# Patient Record
Sex: MALE | Race: WHITE
[De-identification: names, ages, dates, MRNs, and addresses within clinical notes are randomized per-mention and may not be internally consistent; named-entity substitution may affect disease eponyms.]

---

## 2020-08-02 ENCOUNTER — HOSPITAL ENCOUNTER (INPATIENT)
Dept: HOSPITAL 74 - JER | Age: 80
LOS: 4 days | Discharge: NURSING, REHAB OR LONG-TERM CARE | DRG: 291 | End: 2020-08-06
Attending: INTERNAL MEDICINE | Admitting: INTERNAL MEDICINE
Payer: COMMERCIAL

## 2020-08-02 VITALS — BODY MASS INDEX: 19.9 KG/M2

## 2020-08-02 DIAGNOSIS — I25.10: ICD-10-CM

## 2020-08-02 DIAGNOSIS — D50.9: ICD-10-CM

## 2020-08-02 DIAGNOSIS — Z95.2: ICD-10-CM

## 2020-08-02 DIAGNOSIS — Z86.73: ICD-10-CM

## 2020-08-02 DIAGNOSIS — I44.0: ICD-10-CM

## 2020-08-02 DIAGNOSIS — R33.9: ICD-10-CM

## 2020-08-02 DIAGNOSIS — L89.151: ICD-10-CM

## 2020-08-02 DIAGNOSIS — E78.5: ICD-10-CM

## 2020-08-02 DIAGNOSIS — D50.1: ICD-10-CM

## 2020-08-02 DIAGNOSIS — I11.0: Primary | ICD-10-CM

## 2020-08-02 DIAGNOSIS — I35.0: ICD-10-CM

## 2020-08-02 DIAGNOSIS — R42: ICD-10-CM

## 2020-08-02 DIAGNOSIS — Z95.5: ICD-10-CM

## 2020-08-02 DIAGNOSIS — E46: ICD-10-CM

## 2020-08-02 DIAGNOSIS — T83.518A: ICD-10-CM

## 2020-08-02 DIAGNOSIS — E03.9: ICD-10-CM

## 2020-08-02 DIAGNOSIS — I45.10: ICD-10-CM

## 2020-08-02 DIAGNOSIS — K21.9: ICD-10-CM

## 2020-08-02 DIAGNOSIS — N39.0: ICD-10-CM

## 2020-08-02 DIAGNOSIS — B96.5: ICD-10-CM

## 2020-08-02 DIAGNOSIS — I50.23: ICD-10-CM

## 2020-08-02 DIAGNOSIS — N40.0: ICD-10-CM

## 2020-08-02 DIAGNOSIS — E43: ICD-10-CM

## 2020-08-02 LAB
ALBUMIN SERPL-MCNC: 2.8 G/DL (ref 3.4–5)
ALP SERPL-CCNC: 101 U/L (ref 45–117)
ALT SERPL-CCNC: 12 U/L (ref 13–61)
ANION GAP SERPL CALC-SCNC: 4 MMOL/L (ref 8–16)
APPEARANCE UR: (no result)
APTT BLD: 34.9 SECONDS (ref 25.2–36.5)
AST SERPL-CCNC: 32 U/L (ref 15–37)
BACTERIA # UR AUTO: 5261 /UL (ref 0–1359)
BASOPHILS # BLD: 0.7 % (ref 0–2)
BILIRUB SERPL-MCNC: 0.4 MG/DL (ref 0.2–1)
BILIRUB UR STRIP.AUTO-MCNC: NEGATIVE MG/DL
BNP SERPL-MCNC: 6407.7 PG/ML (ref 5–450)
BUN SERPL-MCNC: 26.3 MG/DL (ref 7–18)
CALCIUM SERPL-MCNC: 8.6 MG/DL (ref 8.5–10.1)
CASTS URNS QL MICRO: 5 /UL (ref 0–3.1)
CHLORIDE SERPL-SCNC: 107 MMOL/L (ref 98–107)
CO2 SERPL-SCNC: 25 MMOL/L (ref 21–32)
COLOR UR: YELLOW
CREAT SERPL-MCNC: 1.3 MG/DL (ref 0.55–1.3)
DEPRECATED RDW RBC AUTO: 17.5 % (ref 11.9–15.9)
EOSINOPHIL # BLD: 3.1 % (ref 0–4.5)
EPITH CASTS URNS QL MICRO: 20 /UL (ref 0–25.1)
GLUCOSE SERPL-MCNC: 78 MG/DL (ref 74–106)
HCT VFR BLD CALC: 34.9 % (ref 35.4–49)
HGB BLD-MCNC: 11.3 GM/DL (ref 11.7–16.9)
INR BLD: 1.18 (ref 0.83–1.09)
KETONES UR QL STRIP: NEGATIVE
LEUKOCYTE ESTERASE UR QL STRIP.AUTO: (no result)
LYMPHOCYTES # BLD: 11.3 % (ref 8–40)
MAGNESIUM SERPL-MCNC: 2.2 MG/DL (ref 1.8–2.4)
MCH RBC QN AUTO: 26.2 PG (ref 25.7–33.7)
MCHC RBC AUTO-ENTMCNC: 32.3 G/DL (ref 32–35.9)
MCV RBC: 81.2 FL (ref 80–96)
MONOCYTES # BLD AUTO: 9.8 % (ref 3.8–10.2)
NEUTROPHILS # BLD: 75.1 % (ref 42.8–82.8)
NITRITE UR QL STRIP: NEGATIVE
PH UR: 6 [PH] (ref 5–8)
PLATELET # BLD AUTO: 271 K/MM3 (ref 134–434)
PMV BLD: 8.5 FL (ref 7.5–11.1)
POTASSIUM SERPLBLD-SCNC: 4.8 MMOL/L (ref 3.5–5.1)
PROT SERPL-MCNC: 6.9 G/DL (ref 6.4–8.2)
PROT UR QL STRIP: (no result)
PROT UR QL STRIP: NEGATIVE
PT PNL PPP: 13.9 SEC (ref 9.7–13)
RBC # BLD AUTO: 132 /UL (ref 0–23.9)
RBC # BLD AUTO: 4.29 M/MM3 (ref 4–5.6)
SODIUM SERPL-SCNC: 137 MMOL/L (ref 136–145)
SP GR UR: 1.01 (ref 1.01–1.03)
UROBILINOGEN UR STRIP-MCNC: 0.2 MG/DL (ref 0.2–1)
WBC # BLD AUTO: 8 K/MM3 (ref 4–10)
WBC # UR AUTO: 1575 /UL (ref 0–25.8)

## 2020-08-02 PROCEDURE — U0003 INFECTIOUS AGENT DETECTION BY NUCLEIC ACID (DNA OR RNA); SEVERE ACUTE RESPIRATORY SYNDROME CORONAVIRUS 2 (SARS-COV-2) (CORONAVIRUS DISEASE [COVID-19]), AMPLIFIED PROBE TECHNIQUE, MAKING USE OF HIGH THROUGHPUT TECHNOLOGIES AS DESCRIBED BY CMS-2020-01-R: HCPCS

## 2020-08-02 PROCEDURE — E0186 AIR PRESSURE MATTRESS: HCPCS

## 2020-08-02 PROCEDURE — G0378 HOSPITAL OBSERVATION PER HR: HCPCS

## 2020-08-02 RX ADMIN — FUROSEMIDE SCH: 10 INJECTION, SOLUTION INTRAVENOUS at 22:29

## 2020-08-02 RX ADMIN — DOCUSATE SODIUM SCH: 100 CAPSULE, LIQUID FILLED ORAL at 22:29

## 2020-08-02 RX ADMIN — ATORVASTATIN CALCIUM SCH: 20 TABLET, FILM COATED ORAL at 22:29

## 2020-08-02 RX ADMIN — FERROUS SULFATE TAB EC 324 MG (65 MG FE EQUIVALENT) SCH: 324 (65 FE) TABLET DELAYED RESPONSE at 22:29

## 2020-08-02 NOTE — PDOC
Rapid Medical Evaluation


Time Seen by Provider: 08/02/20 15:33


Medical Evaluation: 





08/02/20 15:38


I have performed a brief in-person evaluation of this patient.





CC: left sided chest pain staring this morning at rest. D/C'd from [facility] 

with dx- ileus, JO ANN, hypoNa+, urinary retention





PE: Lungs CTAB. RRR. No m/r/g. 2+ pedal edema. 





Orders: cardiac w/u





Patient will proceed to ED for further evaluation.





**Discharge Disposition





- Diagnosis


 Chest pain








- Referrals





- Patient Instructions





- Post Discharge Activity

## 2020-08-02 NOTE — HP
CHIEF COMPLAINT: chest pain





PCP: none, recently moved to area





HISTORY OF PRESENT ILLNESS:


Patient is an 79 year old man with a PMH of HTN, CHF, Aortic Stenosis (with 

valve replacement), coronary stent placement, TIA, GERD, BPH,  Urinary retention

(with indwelling swenson catheter), and Hypothyroidism with 2 day history of chest

pain. Pt reports 2 episodes (once yesterday evening and once today afternoon) of

5/10 pressure-like chest pain that radiates to his L shoulder and resolve 

spontaneously. The first episode lasted "1-2 minutes" and the second episode 

today lasted longer at "about 5-10 minutes." Reports taking aspiring 325 mg 

after his second episode. Pt was sitting during both episodes with no exertion 

beforehand. Pt reports associated dizziness, orthopnea, leg swelling (over the 

last 2-3 weeks), and dyspnea on exertion (over the last 6 months; not acutely 

worsened from baseline). Denies headache, PND, palpitations, fevers, chills, 

nausea, vomiting, diarrhea, abdominal pain, and constipation. Pt reports not 

taking his prescribed lasix along with his other home medications because he 

doesn't want to be taking so many medications. A recent change in the pt's life 

has been that his wife of nearly 60 years  about 3 weeks ago and the pt 

reports feelings of depression and hopelessness; without active suicidal or 

homicidal ideation. Pt also with 1 episode of self-resolving hematuria.








ER course was notable for:


(1) Trops neg x 2; BNP > 6000 


(2) No ST elevations or T wave changes on EKG


(3) CXR to be done





Recent Travel: none; Sick Contacts:  none





PAST MEDICAL HISTORY:


As per HPI.


Per pt's daughter, pt seen at [facility] on ; indwelling 

swenson placed then. 


Most of pt's doctor were at [facility]





PAST SURGICAL HISTORY:


Stent Placement x2 (appox 10 years ago and 20 years ago)


Aortic Valve Replacement ("at least 10 years ago"





Social History:


Pt used to live in [place] with his wife before his wife passed. He has now

moved to [place] to be closer to his daughter and lives on his own; within 5 

minutes of his daughter. Daughter checks in on pt 3-5 days/week; expresses 

desire to get the pt a home health aide. Pt ambulates on own with walker/cane 

and can handle most of his ADLs. 





Smoking: quit 40 years ago; 10 year hx of 1.5 ppd before then


Alcohol: denies


Drugs: denies





Family Hx - denies family hx 





Allergies - no known allergies





No Known Allergies Allergy (Verified 20 15:43)


   








HOME MEDICATIONS:


                                Home Medications











 Medication  Instructions  Recorded


 


Amlodipine Besylate 5 mg PO DAILY 20


 


Atorvastatin Ca [Lipitor] 20 mg PO HS 20


 


Carvedilol [Coreg -] 3.125 mg PO BID 20


 


Docusate Sodium [Colace -] 100 mg PO BID 20


 


Ergocalciferol (Vitamin D2) 50,000 unit PO WEEKLY 20





[Vitamin D2]  


 


Ferrous Sulfate 325 mg PO BID 20


 


Levothyroxine [Synthroid -] 100 mcg PO DAILY 20


 


Metoprolol Succinate [Toprol Xl] 25 mg PO DAILY 20


 


Tamsulosin HCl 0.4 mg PO DAILY 20





Patient reports he has not been taking his home meds.





REVIEW OF SYSTEMS


as per HPI








PHYSICAL EXAMINATION


                               Vital Signs - 24 hr











  20





  15:38 16:00 16:54


 


Temperature 98 F  


 


Pulse Rate 61 61 


 


Respiratory 18  18





Rate   


 


Blood Pressure 116/70  


 


O2 Sat by Pulse 99 100 100





Oximetry (%)   











GENERAL: Awake, alert, and fully oriented, in no acute distress. 


HEAD: NCAT


EYES: PERRLA, EOMI, sclera white, conjunctiva clear.


EARS, NOSE, THROAT: oropharynx clear without exudates. Moist mucous membranes.


NECK:  supple without lymphadenopathy, trachea midline


LUNGS: Bilateral crackles at the bases. Breath sounds equal. No wheezes. No 

accessory muscle use.


HEART: Regular rate and rhythm, normal S1 and S2. Systolic mumur over aortic 

area.


ABDOMEN:  Soft, nontender, not distended, normoactive bowel sounds. No 

suprapubic tenderness


MUSCULOSKELETAL: Moving all extremities equally and spontaneously.


UPPER EXTREMITIES: 2+ pulses, warm, well-perfused. No edema.


LOWER EXTREMITIES: 2+ pulses, warm, well-perfused. No calf tenderness. 2+ 

pitting edema


NEUROLOGICAL: Sensation to light touch and strength grossly intact. Normal 

speech. Normal gait.


PSYCHIATRIC: Cooperative. Good eye contact. Depressed mood and affect.


SKIN: lower extremity skin excoriations bilaterally; hyperkeratosis around both 

ankles.





                         Laboratory Results - last 24 hr











  20





  16:15 16:15 16:15


 


WBC   8.0 


 


RBC   4.29 


 


Hgb   11.3 L 


 


Hct   34.9 L 


 


MCV   81.2 


 


MCH   26.2 


 


MCHC   32.3 


 


RDW   17.5 H 


 


Plt Count   271 


 


MPV   8.5 


 


Absolute Neuts (auto)   6.0 


 


Neutrophils %   75.1 


 


Lymphocytes %   11.3 


 


Monocytes %   9.8 


 


Eosinophils %   3.1 


 


Basophils %   0.7 


 


Nucleated RBC %   0 


 


PT with INR  13.90 H  


 


INR  1.18 H  


 


PTT (Actin FS)  34.9  


 


Sodium    137


 


Potassium    4.8


 


Chloride    107


 


Carbon Dioxide    25


 


Anion Gap    4 L


 


BUN    26.3 H


 


Creatinine    1.3


 


Est GFR (CKD-EPI)AfAm    59.73


 


Est GFR (CKD-EPI)NonAf    51.53


 


Random Glucose    78


 


Calcium    8.6


 


Magnesium    2.2


 


Total Bilirubin    0.4


 


AST    32


 


ALT    12 L


 


Alkaline Phosphatase    101


 


Creatine Kinase    35


 


Troponin I    0.02


 


B-Natriuretic Peptide    6407.7 H


 


Total Protein    6.9


 


Albumin    2.8 L














  20





  19:35


 


WBC 


 


RBC 


 


Hgb 


 


Hct 


 


MCV 


 


MCH 


 


MCHC 


 


RDW 


 


Plt Count 


 


MPV 


 


Absolute Neuts (auto) 


 


Neutrophils % 


 


Lymphocytes % 


 


Monocytes % 


 


Eosinophils % 


 


Basophils % 


 


Nucleated RBC % 


 


PT with INR 


 


INR 


 


PTT (Actin FS) 


 


Sodium 


 


Potassium 


 


Chloride 


 


Carbon Dioxide 


 


Anion Gap 


 


BUN 


 


Creatinine 


 


Est GFR (CKD-EPI)AfAm 


 


Est GFR (CKD-EPI)NonAf 


 


Random Glucose 


 


Calcium 


 


Magnesium 


 


Total Bilirubin 


 


AST 


 


ALT 


 


Alkaline Phosphatase 


 


Creatine Kinase 


 


Troponin I  0.02


 


B-Natriuretic Peptide 


 


Total Protein 


 


Albumin 











ASSESSMENT/PLAN:


Patient is an 79 year old man with a PMH of HTN, CHF, Aortic Stenosis (with 

valve replacement), coronary stent placement, TIA, GERD, BPH,  Urinary retention

(with indwelling swenson catheter), and Hypothyroidism being admitted to telemetry

for CHF exacerbation.





#Chest Pain/Acute on Chronic CHF exacerbation (likely 2/2 to medication non-

adherence)


Troponin neg (x2)


Pt recently moved from [place]; no ECHO on file; BNP 6407.7 (baseline unkn

own)


- continue to trend troponin


- IV lasix 40 daily; escalate according to pt response


- f/u CXR


- ECHO


- Strict monitoring of I/O; daily weights


- Repeat EKG as pt has no previous EKG in system for comparison


- check TSH, fasting lipids, and UA


- monitor electrolytes and renal function





#Anemia (unclear etiology, Hb 11.3)


self-resolving hematuria


- check iron studies, reticulocyte count, and serial stool guiacs


- check UA





#Suspected Acute Complicated UTI


Likely 2/2 to chronic indwelling swenson cather (played on )


- UA with 3+ leuk esterase, wbc


- check urine cx 


- rocephin 1g daily 





#Hypoalbuminemia


2/2 to malnutrition vs inflammation from comorbid conditions


- consult dietician


- check UA





#HTN


- hold home antihypertensive: metoprolol 25 daily; amlodipine 5 daily; coreg 

3.125 bid





#BPH


- continue home flomax 0.4 mg





#HLD


- continue lipitor 20 mg 





#Hypothyroid


- Synthroid 100 mcg PO





#DVT PPx


- Lovenox 40 mg SQ daily





#FEN


-F - PO


-E - monitor lytes; replete PRN


-N - low Na diet





Dispo: Admit to telemetry








Visit type





- Medication Review


Med list reviewed for High Risk Meds patients 65 and older: Yes





- Emergency Visit


Emergency Visit: Yes


ED Registration Date: 20


Care time: The patient presented to the Emergency Department on the above date 

and was hospitalized for further evaluation of their emergent condition.





- New Patient


This patient is new to me today: Yes


Date on this admission: 20





- Critical Care


Critical Care patient: No





ATTENDING PHYSICIAN STATEMENT





I saw and evaluated the patient.


I reviewed the resident's note and discussed the case with the resident.


I agree with the resident's findings and plan as documented.








SUBJECTIVE:








OBJECTIVE:








ASSESSMENT AND PLAN:

## 2020-08-02 NOTE — PDOC
History of Present Illness





- General


Chief Complaint: Chest Pain


Stated Complaint: CHEST PAIN


Time Seen by Provider: 08/02/20 15:33





- History of Present Illness


Initial Comments: 


78 yo male with PMH of htn, hld, chf, TIA, GERD, BPH w/ indwelling catheter, 

aortic stenosis w/ valve replacement, and coronary stents presenting with 2 day 

hx of chest pressure. Chest pressure started at noon, lasted 15 minutes, and 

radiated to the left arm. He endorses SOB that is consistent with his baseline 

CHF. He had 1 episode of hematuria that resolved spontaneously. He denies ha, 

n/v/d, abd pain, bloody stool. 


08/02/20 18:24








Past History





- Medical History


Allergies/Adverse Reactions: 


                                    Allergies











Allergy/AdvReac Type Severity Reaction Status Date / Time


 


No Known Allergies Allergy   Verified 08/02/20 15:43











Home Medications: 


Ambulatory Orders





Amlodipine Besylate 5 mg PO DAILY 08/02/20 


Atorvastatin Ca [Lipitor] 20 mg PO HS 08/02/20 


Carvedilol [Coreg -] 3.125 mg PO BID 08/02/20 


Docusate Sodium [Colace -] 100 mg PO BID 08/02/20 


Ergocalciferol (Vitamin D2) [Vitamin D2] 50,000 unit PO WEEKLY 08/02/20 


Ferrous Sulfate 325 mg PO BID 08/02/20 


Levothyroxine [Synthroid -] 100 mcg PO DAILY 08/02/20 


Metoprolol Succinate [Toprol Xl] 25 mg PO DAILY 08/02/20 


Tamsulosin HCl 0.4 mg PO DAILY 08/02/20 








Cardiac Disorders: Yes


COPD: No


CHF: No


 Disorders: Yes (urinary retention swenson in place)


HTN: Yes





- Surgical History


Cardiac Surgery: Yes (x 2)


GI Surgery: Yes





- Psycho-Social/Smoking History


Smoking History: Never smoked





- Substance Abuse Hx (Audit-C & DAST Scrn)


How often the patient has a drink containing alcohol: Never


Score: In Men: 4 or > Positive; In Women: 3 or > Positive: 0


Screen Result (Pos requires Nsg. Audit-10AR): Negative





**Review of Systems





- Review of Systems


Constitutional: No: Chills, Fever


HEENTM: No: Recent change in vision, Double Vision


Respiratory: Yes: Shortness of Breath.  No: Cough


Cardiac (ROS): Yes: Chest Pain, Edema, Chest Tightness (radiating to the left). 

No: Palpitations


ABD/GI: No: Blood Streaked Bowels, Diarrhea, Nausea, Vomiting


: Yes: Hematuria.  No: Dysuria


Musculoskeletal: No: Joint Pain, Joint Swelling


Integumentary: No: Dryness, Erythema, Lesions


Neurological: No: Headache


Psychiatric: No: Anxiety, Depression, Mood Swings


Endocrine: No: Intolerance to Cold, Intolerance to Heat





*Physical Exam





- Vital Signs


                                Last Vital Signs











Temp Pulse Resp BP Pulse Ox


 


 98 F   61   18   116/70   99 


 


 08/02/20 15:38  08/02/20 15:38  08/02/20 15:38  08/02/20 15:38  08/02/20 15:38














- Physical Exam


General Appearance: Yes: Appropriately Dressed.  No: Apparent Distress


HEENT: positive: Normal Voice.  negative: EOMI


Neck: negative: Tender, Rigid


Respiratory/Chest: positive: Normal Breath Sounds, Other.  negative: Chest 

Tender, Respiratory Distress


Cardiovascular: positive: Regular Rhythm, Regular Rate, S1, S2, Edema (b/l LE 

edema)


Gastrointestinal/Abdominal: positive: Flat, Soft.  negative: Tender


Musculoskeletal: positive: Normal Inspection.  negative: CVA Tenderness


Extremity: positive: Normal Inspection


Integumentary: positive: Normal Color, Dry, Warm


Neurologic: positive: Fully Oriented, Alert, Normal Mood/Affect





ED Treatment Course





- LABORATORY


CBC & Chemistry Diagram: 


                                 08/02/20 16:15





                                 08/02/20 16:15





Medical Decision Making





- Medical Decision Making


78 yo male with PMH of htn, hld, chf, TIA, GERD, BPH w/ indwelling catheter, 

aortic stenosis w/ valve replacement, and coronary stents presenting with 2 day 

hx of chest pressure. EKG does not show any st elevations. 





BNP is elevated at 6400


1st Troponin is negative


2nd Troponin is pending





Plan to admit for observation/telemetry 








Discharge





- Discharge Information


Problems reviewed: Yes


Clinical Impression/Diagnosis: 


 Chest pain, Bilateral lower extremity edema








- Admission


Yes





- Follow up/Referral





- Patient Discharge Instructions





- Post Discharge Activity

## 2020-08-02 NOTE — PN
Teaching Attending Note


Name of Resident: Bandar Shaikh





ATTENDING PHYSICIAN STATEMENT





I saw and evaluated the patient.


I reviewed the resident's note and discussed the case with the resident.


I agree with the resident's findings and plan as documented.








SUBJECTIVE:


Patient is an 79 year old man with a PMH of HTN, CHF, TIA, GERD, BPH, Hypothy

roidism, Urinary retention (with indwelling swensno catheter), Aortic stenosis 

(with valve replacement) and Coronary stents presenting with 2 day history of 

chest pressure. Chest pressure started at noon, lasted 15 minutes, and radiated 

to the left arm. He reports SOB that is consistent with his baseline CHF, but he

has had worsening SOB over the past 6 months. has also had orthopnea and 

increasing leg edema. He has not been taking prescribed Lasix as well as most 

his other drugs because he feels they are "too many". He had 1 episode of 

hematuria that resolved spontaneously. Patient denies abdominal pain, headache, 

palpitations, dizziness, fever, chills, nausea, vomiting, diarrhea, 

constipation, dysuria, frequency, urgency, melena or hematochezia. Patient's 

wife  about twenty days ago and he then moved to [place] because his 

daughter lives here. All his physicians were in [place], [place]. Uses a walker/cane.

Denies alcohol, tobacco or illicit drug use. No sick contacts or recent travels.

Family history is unremarkable.  





OBJECTIVE:


Alert


                                   Vital Signs











 Period  Temp  Pulse  Resp  BP Sys/Villa  Pulse Ox


 


 Last 24 Hr  98 F  61-61  18-18  116/70  








HEENT: No Jaundice, eye redness or discharge, PERRLA, EOMI. Normocephalic, 

atraumatic. External ears are normal and hearing is grossly intact. No nasal 

discharge.


Neck: Supple, nontender. No palpable adenopathy or thyromegaly. No JVD


Chest: Good effort. Bibasilar crackles. Clear to percussion.


Heart: Regular. No S3 or rub; 2/6 VERONA.


Abdomen: Not distended, soft, nontender and no HSM. No rebound or guarding. 

Normal bowel sounds.


Ext: Peripheral pulses intact. Leg edema. Chronic stasis dermatitis changes.


Skin: Warm and dry. No petechiae, rash or ecchymosis.


Neuro: Alert. Oriented x3. CN 2-12 grossly intact. Sensation grossly intact in 

all four extremities and DTR are symmetric.


Psych: Appropriate mood and affect. Good insight.


                                Home Medications











 Medication  Instructions  Recorded


 


Amlodipine Besylate 5 mg PO DAILY 20


 


Aspirin [ASA -] 325 mg PO DAILY 20


 


Atorvastatin Ca [Lipitor] 20 mg PO HS 20


 


Carvedilol [Coreg -] 3.125 mg PO BID 20


 


Docusate Sodium [Colace -] 100 mg PO BID 20


 


Ergocalciferol (Vitamin D2) 50,000 unit PO WEEKLY 20





[Vitamin D2]  


 


Ferrous Sulfate 325 mg PO BID 20


 


Levothyroxine [Synthroid -] 100 mcg PO DAILY 20


 


Metoprolol Succinate [Toprol Xl] 25 mg PO DAILY 20


 


Sodium Bicarbonate - 1,950 mg PO TID 20


 


Tamsulosin HCl 0.4 mg PO DAILY 20








                              Abnormal Lab Results











  20





  16:15 16:15 16:15


 


Hgb   11.3 L 


 


Hct   34.9 L 


 


RDW   17.5 H 


 


PT with INR  13.90 H  


 


INR  1.18 H  


 


Anion Gap    4 L


 


BUN    26.3 H


 


ALT    12 L


 


B-Natriuretic Peptide    6407.7 H


 


Albumin    2.8 L








                               Current Medications











Generic Name Dose Route Start Last Admin





  Trade Name Freq  PRN Reason Stop Dose Admin


 


Atorvastatin Calcium  20 mg  20 22:00 





  Lipitor -  PO  





  HS [facility]  


 


Docusate Sodium  100 mg  20 22:00 





  Colace -  PO  





  BID [facility]  


 


Enoxaparin Sodium  40 mg  20 10:00 





  Lovenox -  SQ  





  DAILY [facility]  


 


Ergocalciferol  50,000 unit  20 22:00 





  Drisdol -  PO  





  Blanc@2200 [facility]  


 


Ferrous Sulfate  325 mg  20 22:00 





  Feosol -  PO  





  BID [facility]  


 


Furosemide  40 mg  20 21:45 





  Lasix Injection -  IVPUSH  





  DAILY [facility]  


 


Levothyroxine Sodium  100 mcg  20 07:00 





  Synthroid -  PO  





  DAILY@0700 [facility]  


 


Tamsulosin HCl  0.4 mg  20 10:00 





  Flomax -  PO  





  DAILY [facility]  











ASSESSMENT AND PLAN:


1. Chest pain/CHF exacerbation - Has risk factors for ACS. CHF exacerbation 

likely precipitated in part by nonadherence with medications. EKG shows NSR at  

 63/minute, 1o AV block, RBBB, LAD, LAE, q wave in II, III, aVF and QTc 503 with

no significant ST-T wave changes. No old EKG available for comparison. Troponin 

is negative x2. Will avoid drugs that may prolong QTc. CXR has been done, but we

are unable to see the film. Reportedly took Aspirin 325 mg. Will admit to 

telemetry, get ECHO, TSH, fasting lipids and urinalysis STAT. 





Will treat with escalating doses of IV Lasix to achieve adequate diuresis, 

restrict dietary salt intake, monitor renal function, monitor and replete 

electrolytes, get daily weight and consult Cardiology. During the day, will work

with his family to get his medical records from [facility]. Will also confirm 

the last time his swenson was changed. Viral testing for COVID-19 ordered and 

patient placed on airborne, droplet and contact isolation. Will continue 

comprehensive care for all of patients comorbid conditions including Flomax for

BPH and Synthroid for hypothroidism.





2. Hypoalbuminemia - Possibly due to combined effects of malnutrition and 

inflammation associated with comorbid conditions. Will ensure adequate dietary 

protein intake and also consult dietician. Urinalysis pending.





3. Anemia - Cause unclear. Will do basic anemia work up including serial stool 

guaiacs, reticulocyte count and iron studies. 





4. Hypertension  Will hold all outpatient antihypertensive drugs for now to 

permit liberal use of IV lasix for diuresis. Subsequently, will revise regimen 

to ensure round-the-clock excellent BP control. Patient counseled on the 

injurious effects of uncontrolled hypertension. Nonpharmacologic measures to 

control hypertension like weight loss, salt restriction and exercise stressed. 

Importance of adherence to treatment regimen and attainment of normotension 

emphasized. 





5. DVT prophylaxis - Lovenox 40 mg SQ q 24 hours.     





6. Advance directives - Full code

## 2020-08-02 NOTE — PDOC
Documentation entered by Lisseth Hess SCRIBE, acting as scribe for Sakina Bergman MD.








Sakina Bergman MD:  This documentation has been prepared by the narinderibeDeshawn Ana, SCRIBE, under my direction and personally reviewed by me in its 

entirety.  I confirm that the documentation accurately reflects all work, 

treatment, procedures, and medical decision making performed by me.  





Attending Attestation





- Resident


Resident Name: Ian Murray





- ED Attending Attestation


I have performed the following: I have examined & evaluated the patient, The 

case was reviewed & discussed with the resident, I agree w/resident's findings &

plan, Exceptions are as noted





- HPI


HPI: 





20 17:03


Patient is an 79 year old male with a significant past medical history of 

hypertension, hld, chf, TIA, GERD, BPH w/ indwelling catheter, aortic stenosis 

w/ valve replacement, and coronary stents, who presents to the ED with chest 

pressure x2 days.





Patient denies:


This patient's  wife just  2 1/2  weeks ago and he then he recently moved to

[place] because his daughter lives here.    All his physicians were in 

[facility] 


Allergies: NKDA











20 18:18





20 18:49








- Physicial Exam


PE: 





20 18:23


wnwd 78 yo male p/w 2 days of shortness of breath and chest pain


head ncat 


neck supple


lungs fine bibasilar rales 


cvs   [id number]


abdomen  nontender


skin warm and dry


extremities +3 pitting edema LE


neuro axox3,ambulatory


psych  sad affect





- Medical Decision Making





20 18:29


troponin first negative


bnp >6000


20 18:38


ekg  NSR with !st degree AV block with , RBBB,  qs  in II,III,aVF   We have no 

prior ekgs





pt now comfortable, he took 325 mg aspirin this morning


plan  Telemetry admission





Discharge





- Discharge Information


Problems reviewed: Yes


Clinical Impression/Diagnosis: 


 Chest pain, Bilateral lower extremity edema








- Follow up/Referral





- Patient Discharge Instructions





- Post Discharge Activity

## 2020-08-03 LAB
ALBUMIN SERPL-MCNC: 2.7 G/DL (ref 3.4–5)
ALP SERPL-CCNC: 89 U/L (ref 45–117)
ALT SERPL-CCNC: 10 U/L (ref 13–61)
ANION GAP SERPL CALC-SCNC: 6 MMOL/L (ref 8–16)
AST SERPL-CCNC: 28 U/L (ref 15–37)
BILIRUB SERPL-MCNC: 0.5 MG/DL (ref 0.2–1)
BUN SERPL-MCNC: 25.8 MG/DL (ref 7–18)
CALCIUM SERPL-MCNC: 8.3 MG/DL (ref 8.5–10.1)
CHLORIDE SERPL-SCNC: 108 MMOL/L (ref 98–107)
CHOLEST SERPL-MCNC: 125 MG/DL (ref 50–200)
CO2 SERPL-SCNC: 25 MMOL/L (ref 21–32)
CREAT SERPL-MCNC: 1.3 MG/DL (ref 0.55–1.3)
DEPRECATED RDW RBC AUTO: 16.9 % (ref 11.9–15.9)
GLUCOSE SERPL-MCNC: 75 MG/DL (ref 74–106)
HCT VFR BLD CALC: 33.8 % (ref 35.4–49)
HDLC SERPL-MCNC: 43 MG/DL (ref 40–60)
HGB BLD-MCNC: 10.6 GM/DL (ref 11.7–16.9)
IRON SERPL-MCNC: 30 UG/DL (ref 50–175)
LDLC SERPL CALC-MCNC: 65 MG/DL (ref 5–100)
MAGNESIUM SERPL-MCNC: 2.1 MG/DL (ref 1.8–2.4)
MCH RBC QN AUTO: 25.5 PG (ref 25.7–33.7)
MCHC RBC AUTO-ENTMCNC: 31.4 G/DL (ref 32–35.9)
MCV RBC: 81.2 FL (ref 80–96)
PHOSPHATE SERPL-MCNC: 3.2 MG/DL (ref 2.5–4.9)
PLATELET # BLD AUTO: 255 K/MM3 (ref 134–434)
PMV BLD: 8.7 FL (ref 7.5–11.1)
POTASSIUM SERPLBLD-SCNC: 4.6 MMOL/L (ref 3.5–5.1)
PROT SERPL-MCNC: 6.2 G/DL (ref 6.4–8.2)
RBC # BLD AUTO: 4.16 M/MM3 (ref 4–5.6)
SODIUM SERPL-SCNC: 139 MMOL/L (ref 136–145)
TIBC SERPL-MCNC: 274 UG/DL (ref 250–450)
TRIGL SERPL-MCNC: 58 MG/DL (ref 0–150)
WBC # BLD AUTO: 7.8 K/MM3 (ref 4–10)
YEAST BUDDING URNS QL: (no result)

## 2020-08-03 RX ADMIN — ATORVASTATIN CALCIUM SCH MG: 20 TABLET, FILM COATED ORAL at 21:04

## 2020-08-03 RX ADMIN — CEFTRIAXONE SCH MLS/HR: 1 INJECTION, POWDER, FOR SOLUTION INTRAMUSCULAR; INTRAVENOUS at 11:13

## 2020-08-03 RX ADMIN — TAMSULOSIN HYDROCHLORIDE SCH MG: 0.4 CAPSULE ORAL at 09:43

## 2020-08-03 RX ADMIN — CARVEDILOL SCH MG: 3.12 TABLET, FILM COATED ORAL at 21:04

## 2020-08-03 RX ADMIN — FUROSEMIDE SCH MG: 10 INJECTION, SOLUTION INTRAVENOUS at 11:13

## 2020-08-03 RX ADMIN — DOCUSATE SODIUM SCH MG: 100 CAPSULE, LIQUID FILLED ORAL at 09:43

## 2020-08-03 RX ADMIN — FERROUS SULFATE TAB EC 324 MG (65 MG FE EQUIVALENT) SCH: 324 (65 FE) TABLET DELAYED RESPONSE at 21:05

## 2020-08-03 RX ADMIN — LEVOTHYROXINE SODIUM SCH MCG: 100 TABLET ORAL at 06:33

## 2020-08-03 RX ADMIN — ENOXAPARIN SODIUM SCH MG: 40 INJECTION SUBCUTANEOUS at 09:44

## 2020-08-03 RX ADMIN — FERROUS SULFATE TAB EC 324 MG (65 MG FE EQUIVALENT) SCH MG: 324 (65 FE) TABLET DELAYED RESPONSE at 09:43

## 2020-08-03 RX ADMIN — DOCUSATE SODIUM SCH: 100 CAPSULE, LIQUID FILLED ORAL at 21:05

## 2020-08-03 NOTE — EKG
Test Reason : 

Blood Pressure : ***/*** mmHG

Vent. Rate : 061 BPM     Atrial Rate : 061 BPM

   P-R Int : 238 ms          QRS Dur : 174 ms

    QT Int : 498 ms       P-R-T Axes : 012 244 055 degrees

   QTc Int : 501 ms

 

SINUS RHYTHM WITH 1ST DEGREE A-V BLOCK WITH PREMATURE ATRIAL COMPLEXES

RIGHT BUNDLE BRANCH BLOCK

INFERIOR INFARCT (CITED ON OR BEFORE 02-AUG-2020)

ANTEROLATERAL INFARCT (CITED ON OR BEFORE 02-AUG-2020)

ABNORMAL ECG

WHEN COMPARED WITH ECG OF 02-AUG-2020 15:42,

NO SIGNIFICANT CHANGE WAS FOUND

Confirmed by Carmen Shaikh ([id number]) on 8/3/2020 10:01:34 AM

 

Referred By:             Confirmed By:Carmen Shaikh

## 2020-08-03 NOTE — PN
Physical Exam: 


SUBJECTIVE: No overnight events.Patient seen and examined. Complains back pain. 








OBJECTIVE:





                                   Vital Signs











 Period  Temp  Pulse  Resp  BP Sys/Villa  Pulse Ox


 


 Last 24 Hr  97.7 F-98.1 F  60-67  18-20  114-149/60-88  











GENERAL: The patient is awake, alert, and oriented X 2, in no acute distress.


HEENT: Normal with no signs of trauma. No ptosis. Ears normal, nares patent, 

oropharynx clear without exudates, moist mucous membranes.


LUNGS: Breath sounds at bases, clear to auscultation bilaterally, no wheezes, no

crackles, no accessory muscle use. 


HEART: Regular rate and rhythm, S1, S2 without murmur, rub or gallop.systolic 

murmur 


ABDOMEN: Soft, nontender, nondistended, normoactive bowel sounds,


EXTREMITIES: 2+ edema b/l LE. edema increased more on L. skin changes associated

with venous stasis on L LE. 


NEUROLOGICAL: Normal speech, gait not observed.


PSYCH: normal affect.


SKIN: Warm, dry, normal turgor, no rashes or lesions noted


BACK: Stage 1 ulcer in sacral decubitus ulcer








                         Laboratory Results - last 24 hr











  08/02/20 08/03/20 08/03/20





  21:28 01:06 06:05


 


WBC    7.8


 


RBC    4.16


 


Hgb    10.6 L


 


Hct    33.8 L


 


MCV    81.2


 


MCH    25.5 L


 


MCHC    31.4 L


 


RDW    16.9 H


 


Plt Count    255


 


MPV    8.7


 


Retic Count    Cancelled


 


Sodium   


 


Potassium   


 


Chloride   


 


Carbon Dioxide   


 


Anion Gap   


 


BUN   


 


Creatinine   


 


Est GFR (CKD-EPI)AfAm   


 


Est GFR (CKD-EPI)NonAf   


 


Random Glucose   


 


Calcium   


 


Phosphorus   


 


Magnesium   


 


Iron   


 


TIBC   


 


Iron Saturation   


 


Unsaturated IBC   


 


Ferritin   


 


Total Bilirubin   


 


AST   


 


ALT   


 


Alkaline Phosphatase   


 


Troponin I   0.02 


 


Total Protein   


 


Albumin   


 


Triglycerides   


 


Cholesterol   


 


Total LDL Cholesterol   


 


HDL Cholesterol   


 


Vitamin B12   


 


Serum Folate   


 


TSH   


 


Urine Color  Yellow  


 


Urine Appearance  Turbid  


 


Urine pH  6.0  


 


Ur Specific Gravity  1.015  


 


Urine Protein  1+ H  


 


Urine Glucose (UA)  Negative  


 


Urine Ketones  Negative  


 


Urine Blood  2+ H  


 


Urine Nitrite  Negative  


 


Urine Bilirubin  Negative  


 


Urine Urobilinogen  0.2  


 


Ur Leukocyte Esterase  3+ H  


 


Urine WBC (Auto)  1575  


 


Urine RBC (Auto)  132  


 


Urine Casts (Auto)  5  


 


U Pathogenic Cast Auto  None seen  


 


U Epithel Cells (Auto)  20  


 


Urine Bacteria (Auto)  5261  


 


Urine Yeast (Auto)  None seen  














  08/03/20 08/03/20 08/03/20





  06:05 06:05 09:36


 


WBC   


 


RBC   


 


Hgb   


 


Hct   


 


MCV   


 


MCH   


 


MCHC   


 


RDW   


 


Plt Count   


 


MPV   


 


Retic Count    1.81 H


 


Sodium  139  


 


Potassium  4.6  


 


Chloride  108 H  


 


Carbon Dioxide  25  


 


Anion Gap  6 L  


 


BUN  25.8 H  


 


Creatinine  1.3  


 


Est GFR (CKD-EPI)AfAm  59.73  


 


Est GFR (CKD-EPI)NonAf  51.53  


 


Random Glucose  75  


 


Calcium  8.3 L  


 


Phosphorus  3.2  


 


Magnesium  2.1  


 


Iron   30 L 


 


TIBC   274 


 


Iron Saturation   10 L 


 


Unsaturated IBC   244 


 


Ferritin   40.7 


 


Total Bilirubin  0.5  


 


AST  28  


 


ALT  10 L  


 


Alkaline Phosphatase  89  


 


Troponin I   


 


Total Protein  6.2 L  


 


Albumin  2.7 L  


 


Triglycerides   58 


 


Cholesterol   125 


 


Total LDL Cholesterol   65 


 


HDL Cholesterol   43 


 


Vitamin B12   191 L 


 


Serum Folate   10 


 


TSH  5.05 H  


 


Urine Color   


 


Urine Appearance   


 


Urine pH   


 


Ur Specific Gravity   


 


Urine Protein   


 


Urine Glucose (UA)   


 


Urine Ketones   


 


Urine Blood   


 


Urine Nitrite   


 


Urine Bilirubin   


 


Urine Urobilinogen   


 


Ur Leukocyte Esterase   


 


Urine WBC (Auto)   


 


Urine RBC (Auto)   


 


Urine Casts (Auto)   


 


U Pathogenic Cast Auto   


 


U Epithel Cells (Auto)   


 


Urine Bacteria (Auto)   


 


Urine Yeast (Auto)   








Active Medications











Generic Name Dose Route Start Last Admin





  Trade Name Freq  PRN Reason Stop Dose Admin


 


Aspirin  81 mg  08/04/20 10:00 





  Asa -  PO  





  DAILY [facility]  


 


Atorvastatin Calcium  20 mg  08/02/20 22:00  08/03/20 21:04





  Lipitor -  PO   20 mg





  [facility]   Administration


 


Carvedilol  3.125 mg  08/03/20 22:00  08/03/20 21:04





  Coreg -  PO   3.125 mg





  BID [facility]   Administration


 


Docusate Sodium  100 mg  08/02/20 22:00  08/03/20 21:05





  Colace -  PO   Not Given





  BID [facility]  


 


Enoxaparin Sodium  40 mg  08/03/20 10:00  08/03/20 09:44





  Lovenox -  SQ   Not Given





  DAILY [facility]  


 


Ergocalciferol  50,000 unit  08/02/20 22:00  08/02/20 22:29





  Drisdol -  PO   Not Given





  Blanc@2200 [facility]  


 


Ferrous Sulfate  325 mg  08/02/20 22:00  08/03/20 21:05





  Feosol -  PO   Not Given





  BID [facility]  


 


Furosemide  40 mg  08/02/20 21:45  08/03/20 11:13





  Lasix Injection -  IVPUSH   40 mg





  DAILY [facility]   Administration


 


Ceftriaxone Sodium 1 gm/  50 mls @ 100 mls/hr  08/03/20 10:00  08/03/20 11:13





  Dextrose  IVPB   100 mls/hr





  DAILY [facility]   Administration


 


Levothyroxine Sodium  100 mcg  08/03/20 07:00  08/03/20 06:33





  Synthroid -  PO   100 mcg





  DAILY@0700 [facility]   Administration


 


Tamsulosin HCl  0.4 mg  08/03/20 10:00  08/03/20 09:43





  Flomax -  PO   0.4 mg





  DAILY [facility]   Administration











ASSESSMENT/PLAN:


80 YO PMH BPH, urinary retention w/ indwelling swenson catheter, LE edema HTN, 

CHF, aortic stenosis (s/p bioprosthetic valve replacement), CAD s/p PCI/Stent, 

GERD, and TIA hx p/w 2 days of iintermittent CP. SOB, orthopnea, and LE edema 

worsened over the past 6 months 2/2 noncomplaince with home meds. 





#Chest pain


EKG: NSR. 1st degree AV block w/ premature atrial complexes. R bundle branch 

block. 


trops negative X3 





#Acute on Chronic Systolic CHF 


-CXR: Cardiomegaly, BNP - 6407


-Echo: global hypokinesis, EF 35%


-cardio c/s apprecaited. C/w IV lasix & coreg 


-LE edema more on the L LE. Duplex LE: no evidence of DVT





 #UTI 


-UA: 1+ protein, 2+ blood, 132 RBCs, 3+ leukoesterase, 1575 WBcs,  5261 

bacteria, epithelial cells 20


-Urine Cx pending. started on Empiric Ceftriaxone 1 gm 


-Change Swenson.





#Lightheadedness/blurry vision 


-CTH: no acute pathology. multiple chronic supratentorial & infratentorial 

infarcts


-check orthostatics in AM  





#Stage 1 ulcer


-frequent positioning every 2 hrs 





#Iron Deficiency Anemia


LOWIron 30, Iron Sat 10        NORMAL ferritin 40.7, TIBC 274 


home dose of ferrous sulfate 325 PO BID 


h/o hematuria. outpt f/u w/ Urology & GI





#CAD


-c/w ASA, atorvastatin 20 mg PO HS, coreg 3.125 mg PO BID 





#HTN 


c/w coreg 





#BPH 


c/w flomax 





#HLD


-c.w atorvastatin 20 mg PO HS





#Hypothyroidism


continue Synthroid 100 mcg PO DAILY 





#DVT PPX


Lovenox 40 mg SQ





#Severe protein calorie malnutrition


>2% wt loss in 1 weeek, caloric intake <50% of nutritional needs for >5 days 




















Visit type





- Emergency Visit


Emergency Visit: Yes


ED Registration Date: 08/02/20


Care time: The patient presented to the Emergency Department on the above date 

and was hospitalized for further evaluation of their emergent condition.





- New Patient


This patient is new to me today: No





- Critical Care


Critical Care patient: No





- Medication Review


Med list reviewed for High Risk Meds patients 65 and older: Yes





ATTENDING PHYSICIAN STATEMENT





I saw and evaluated the patient.


I reviewed the resident's note and discussed the case with the resident.


I agree with the resident's findings and plan as documented.








SUBJECTIVE:








OBJECTIVE:








ASSESSMENT AND PLAN:

## 2020-08-03 NOTE — ECHO
______________________________________________________________________________



Name: OLE YAO                               Exam:Adult Echocardiogram

MRN: [id number]         Study Date: 2020 10:19 AM

Age: 79 yrs

______________________________________________________________________________



Reason For Study: LV Function

Height: 72 in        Weight: 140 lb        BSA: 1.8 m2



______________________________________________________________________________



MMode/2D Measurements & Calculations

IVSd: 2.0 cm                                     Ao root diam: 3.6 cm

LVIDd: 4.4 cm                                    LA dimension: 4.6 cm

LVIDs: 4.0 cm                                    ACS: 1.4 cm

LVPWd: 0.92 cm



__________________________________________________

LVPWs: 0.94 cm                                   EDV(Teich): 86.4 ml

                                                 ESV(Teich): 70.0 ml



__________________________________________________

LVOT diam: 2.3 cm                                LAV (MOD-bp): 56.0 ml

__________________________________________________



TAPSE: 1.1 cm



Doppler Measurements & Calculations

MVA(VTI): 2.2 cm2                                       MV E max florentino: 108.1 cm/sec

MV V2 max: 127.0 cm/sec                                 MV A max florentino: 69.6 cm/sec

MV max P.5 mmHg                                     MV E/A: 1.6

MV V2 mean: 78.9 cm/sec                                 MV dec time: 0.20 sec

MV mean P.7 mmHg

MV V2 VTI: 35.3 cm



_________________________________________________________

Ao V2 max: 184.6 cm/sec                                 LV V1 max P.5 mmHg

Ao max P.6 mmHg                                    LV V1 mean P.4 mmHg

Ao V2 mean: 127.8 cm/sec                                LV V1 max: 78.5 cm/sec

Ao mean P.8 mmHg                                    LV V1 mean: 56.7 cm/sec

Ao V2 VTI: 40.7 cm                                      LV V1 VTI: 18.4 cm



RAY(I,D): 1.9 cm2

RAY(V,D): 1.8 cm2



_________________________________________________________

MR max florentino: 471.3 cm/sec                                SV(LVOT): 76.9 ml

MR max P.8 mmHg



_________________________________________________________

TR max florentino: 258.8 cm/sec                                PA V2 max: 93.7 cm/sec

TR max P.0 mmHg                                    PA max PG: 3.5 mmHg

_________________________________________________________



Med Peak E' Florentino: 4.1 cm/sec

Med E/e': 26.6

Lat Peak E' Florentino: 8.0 cm/sec

Lat E/e': 13.5



______________________________________________________________________________





Tech Comments

Study not completed because patient grew impatient.

Procedure

Study Quality: Fair.

Left Ventricle

The left ventricle is normal in size. There is mild concentric left ventricular hypertrophy. Ejection
 Fraction

= 35%. Left ventricular systolic function is mild to moderately reduced. The transmitral spectral Dop
pler flow

pattern is suggestive of pseudonormalization. There is moderate global hypokinesis of the left ventri
grisel.

Right Ventricle

The right ventricle is normal in size and function.

Atria

The left atrium is mildly dilated. Right atrial size is normal.

Mitral Valve

There is mild mitral valve thickening. There is mild mitral regurgitation.

Tricuspid Valve

The tricuspid valve is not well visualized, but is grossly normal. There is mild tricuspid regurgitat
ion.

Right ventricular systolic pressure is elevated at 30-40mmHg.

Aortic Valve

The prosthetic aortic valve is well-seated. 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 No hemodynamically


significant valvular aortic stenosis. No aortic regurgitation is present.

Pulmonic Valve

The pulmonic valve is not well seen, but is grossly normal.

Great Vessels

The aortic root is not well visualized.

Pericardium/Pleura

There is no pericardial effusion.

______________________________________________________________________________





Interpretation Summary

LV: Normal size,mild LVH, global hypokinesia, EF around 35% , pseudonormal pattern on inflow, E/e' 13
 mmhg

RV: Appears normal

LA: Mildly dilated

AV; Bioprosthesis, we; seated, normal functio, mPG 8 mmHg

Mild MR

Mild TR with RVSP 38 mmHg.





Carmen Shaikh 2020 11:40 AM

## 2020-08-03 NOTE — CON.CARD
Cardiology Consult (text)





- Consultation


Consultation Note: 





cc: le edema, abd pain





hpi:  79 m hx htn, syst chf, as s/p remote bio avr, cad s/p remote pci, tia, 

here with le edema, abd pain.  Pt poor historian.  Denies cp, says he had 

epigastric bloating yesterday that has resolved.  No sob palps dizzy loc pnd 

orthopnea.  Reports le edema present for several days.  Has not followed with 

cardio recently since moving from [place].





pmh: per hpi


psh: avr


social: no tob


fam: no premature cad


ros: per hpi; all others nl


meds:


Ambulatory Orders





Amlodipine Besylate 5 mg PO DAILY 08/02/20 


Atorvastatin Ca [Lipitor] 20 mg PO HS 08/02/20 


Carvedilol [Coreg -] 3.125 mg PO BID 08/02/20 


Docusate Sodium [Colace -] 100 mg PO BID 08/02/20 


Ergocalciferol (Vitamin D2) [Vitamin D2] 50,000 unit PO WEEKLY 08/02/20 


Ferrous Sulfate 325 mg PO BID 08/02/20 


Levothyroxine [Synthroid -] 100 mcg PO DAILY 08/02/20 


Metoprolol Succinate [Toprol Xl] 25 mg PO DAILY 08/02/20 


Tamsulosin HCl 0.4 mg PO DAILY 08/02/20 





                               Current Medications











Generic Name Dose Route Start Last Admin





  Trade Name Freq  PRN Reason Stop Dose Admin


 


Atorvastatin Calcium  20 mg  08/02/20 22:00  08/02/20 22:29





  Lipitor -  PO   Not Given





  [facility]  


 


Carvedilol  3.125 mg  08/03/20 22:00 





  Coreg -  PO  





  BID [facility]  


 


Docusate Sodium  100 mg  08/02/20 22:00  08/03/20 09:43





  Colace -  PO   100 mg





  BID [facility]   Administration


 


Enoxaparin Sodium  40 mg  08/03/20 10:00  08/03/20 09:44





  Lovenox -  SQ   Not Given





  DAILY [facility]  


 


Ergocalciferol  50,000 unit  08/02/20 22:00  08/02/20 22:29





  Drisdol -  PO   Not Given





  Blanc@2200 [facility]  


 


Ferrous Sulfate  325 mg  08/02/20 22:00  08/03/20 09:43





  Feosol -  PO   325 mg





  BID [facility]   Administration


 


Furosemide  40 mg  08/02/20 21:45  08/03/20 11:13





  Lasix Injection -  IVPUSH   40 mg





  DAILY [facility]   Administration


 


Ceftriaxone Sodium 1 gm/  50 mls @ 100 mls/hr  08/03/20 10:00  08/03/20 11:13





  Dextrose  IVPB   100 mls/hr





  DAILY [facility]   Administration


 


Levothyroxine Sodium  100 mcg  08/03/20 07:00  08/03/20 06:33





  Synthroid -  PO   100 mcg





  DAILY@0700 [facility]LL[facility]   Administration


 


Tamsulosin HCl  0.4 mg  08/03/20 10:00  08/03/20 09:43





  Flomax -  PO   0.4 mg





  DAILY [facility]   Administration














pe:


                                   Vital Signs











 Period  Temp  Pulse  Resp  BP Sys/Villa  Pulse Ox


 


 Last 24 Hr  97.7 F-98.1 F  60-65  18-20  114-141/60-79  








nad no jvd


rrr s1s2 no mrg


cta bl nl eff


aao3


1-2+ le edema bl, no c/c


abd nt nd pos bs


no jaundice diaphoresis


+dp pt no carotid bruits





                             Laboratory Last Values











WBC  7.8 K/mm3 (4.0-10.0)   08/03/20  06:05    


 


RBC  4.16 M/mm3 (4.00-5.60)   08/03/20  06:05    


 


Hgb  10.6 GM/dL (11.7-16.9)  L  08/03/20  06:05    


 


Hct  33.8 % (35.4-49)  L  08/03/20  06:05    


 


MCV  81.2 fl (80-96)   08/03/20  06:05    


 


MCH  25.5 pg (25.7-33.7)  L  08/03/20  06:05    


 


MCHC  31.4 g/dl (32.0-35.9)  L  08/03/20  06:05    


 


RDW  16.9 % (11.9-15.9)  H  08/03/20  06:05    


 


Plt Count  255 K/MM3 (134-434)   08/03/20  06:05    


 


MPV  8.7 fl (7.5-11.1)   08/03/20  06:05    


 


Absolute Neuts (auto)  6.0 K/mm3 (1.5-8.0)   08/02/20  16:15    


 


Neutrophils %  75.1 % (42.8-82.8)   08/02/20  16:15    


 


Lymphocytes %  11.3 % (8-40)   08/02/20  16:15    


 


Monocytes %  9.8 % (3.8-10.2)   08/02/20  16:15    


 


Eosinophils %  3.1 % (0-4.5)   08/02/20  16:15    


 


Basophils %  0.7 % (0-2.0)   08/02/20  16:15    


 


Nucleated RBC %  0 % (0-0)   08/02/20  16:15    


 


Retic Count  1.81 % (0.5-1.5)  H  08/03/20  09:36    


 


PT with INR  13.90 SEC (9.7-13.0)  H  08/02/20  16:15    


 


INR  1.18  (0.83-1.09)  H  08/02/20  16:15    


 


PTT (Actin FS)  34.9 SECONDS (25.2-36.5)   08/02/20  16:15    


 


Sodium  139 mmol/L (136-145)   08/03/20  06:05    


 


Potassium  4.6 mmol/L (3.5-5.1)   08/03/20  06:05    


 


Chloride  108 mmol/L ()  H  08/03/20  06:05    


 


Carbon Dioxide  25 mmol/L (21-32)   08/03/20  06:05    


 


Anion Gap  6 MMOL/L (8-16)  L  08/03/20  06:05    


 


BUN  25.8 mg/dL (7-18)  H  08/03/20  06:05    


 


Creatinine  1.3 mg/dL (0.55-1.3)   08/03/20  06:05    


 


Est GFR (CKD-EPI)AfAm  59.73   08/03/20  06:05    


 


Est GFR (CKD-EPI)NonAf  51.53   08/03/20  06:05    


 


Random Glucose  75 mg/dL ()   08/03/20  06:05    


 


Calcium  8.3 mg/dL (8.5-10.1)  L  08/03/20  06:05    


 


Phosphorus  3.2 mg/dL (2.5-4.9)   08/03/20  06:05    


 


Magnesium  2.1 mg/dL (1.8-2.4)   08/03/20  06:05    


 


Iron  30 ug/dL ()  L  08/03/20  06:05    


 


TIBC  274 ug/dL (250-450)   08/03/20  06:05    


 


Iron Saturation  10 % (17.5-39)  L  08/03/20  06:05    


 


Unsaturated IBC  244 ug/dL (200-275)   08/03/20  06:05    


 


Ferritin  40.7 ng/ml (8-388)   08/03/20  06:05    


 


Total Bilirubin  0.5 mg/dL (0.2-1)   08/03/20  06:05    


 


AST  28 U/L (15-37)   08/03/20  06:05    


 


ALT  10 U/L (13-61)  L  08/03/20  06:05    


 


Alkaline Phosphatase  89 U/L ()   08/03/20  06:05    


 


Creatine Kinase  35 U/L ()   08/02/20  16:15    


 


Troponin I  0.02 ng/ml (0.00-0.05)   08/03/20  01:06    


 


B-Natriuretic Peptide  6407.7 pg/ml (5-450)  H  08/02/20  16:15    


 


Total Protein  6.2 g/dl (6.4-8.2)  L  08/03/20  06:05    


 


Albumin  2.7 g/dl (3.4-5.0)  L  08/03/20  06:05    


 


Triglycerides  58 mg/dL (0-150)   08/03/20  06:05    


 


Cholesterol  125 mg/dL ()   08/03/20  06:05    


 


Total LDL Cholesterol  65 mg/dL (5-100)   08/03/20  06:05    


 


HDL Cholesterol  43 mg/dL (40-60)   08/03/20  06:05    


 


TSH  5.05 uIU/ml (0.358-3.74)  H  08/03/20  06:05    


 


Urine Color  Yellow   08/02/20  21:28    


 


Urine Appearance  Turbid   08/02/20  21:28    


 


Urine pH  6.0  (5.0-8.0)   08/02/20  21:28    


 


Ur Specific Gravity  1.015  (1.010-1.035)   08/02/20  21:28    


 


Urine Protein  1+  (NEGATIVE)  H  08/02/20  21:28    


 


Urine Glucose (UA)  Negative  (NEGATIVE)   08/02/20  21:28    


 


Urine Ketones  Negative  (NEGATIVE)   08/02/20  21:28    


 


Urine Blood  2+  (NEGATIVE)  H  08/02/20  21:28    


 


Urine Nitrite  Negative  (NEGATIVE)   08/02/20  21:28    


 


Urine Bilirubin  Negative  (NEGATIVE)   08/02/20  21:28    


 


Urine Urobilinogen  0.2 mg/dL (0.2-1.0)   08/02/20  21:28    


 


Ur Leukocyte Esterase  3+  (NEGATIVE)  H  08/02/20  21:28    


 


Urine WBC (Auto)  1575 /uL (0-25.8)   08/02/20  21:28    


 


Urine RBC (Auto)  132 /uL (0-23.9)   08/02/20  21:28    


 


Urine Casts (Auto)  5 /uL (0-3.1)   08/02/20  21:28    


 


U Pathogenic Cast Auto  None seen /lpf (NEGATIVE)   08/02/20  21:28    


 


U Epithel Cells (Auto)  20 /uL (0-25.1)   08/02/20  21:28    


 


Urine Bacteria (Auto)  5261 /uL (0-1359)   08/02/20  21:28    


 


Urine Yeast (Auto)  None seen  (NEGATIVE)   08/02/20  21:28    








echo 8/2020:  lvef 35%, global hk, nl rv, lae, nl bio avr, mild mr, mild tr, 

rvsp 38





ecg: sr, rbbb





cxr: clear





tele: sr








a/p:  79 m hx htn, syst chf, as s/p remote bio avr, cad s/p remote pci, tia, 

here with le edema, abd pain.





acute syst chf:


-continue iv lasix, monitor daily chem7, wts


-resume home coreg for chf regimen


-no signs acs, trops negx3





htn:


-cont coreg, monitor





avr:


-stable, nl fcn on echo





cad, remote pci:


-stable, no signs acs


-cont statin, bb, asa





uti:


-on abx

## 2020-08-03 NOTE — PN
Teaching Attending Note


Name of Resident: Gilles Castillo





ATTENDING PHYSICIAN STATEMENT





I saw and evaluated the patient.


I reviewed the resident's note and discussed the case with the resident.


I agree with the resident's findings and plan as documented.





SUBJECTIVE: No further CP. No palpitations/SOB/cough/sputum.No fevers/chills. 

Reports lghtheadedness. No nausea/vomiting/limb numbness or weakness.





OBJECTIVE: Afebrile, Hemodynamicaly Stable.





                                Last Vital Signs











Temp Pulse Resp BP Pulse Ox


 


 98.1 F   65   18   114/62   96 


 


 08/03/20 09:46  08/03/20 09:46  08/03/20 09:46  08/03/20 09:46  08/03/20 09:46








HEENT - Atraumatic, normocephalic.


Heart - S1, S2, SM


Lungs - decreased air entry at bases


Abdomen - Soft, non-tender. Bowel Sounds normal.


Extremities - bilateral LE edema L>R, with venous stasis skin changes on L





                         Laboratory Results - last 24 hr











  08/02/20 08/02/20 08/02/20





  16:15 16:15 16:15


 


WBC   8.0 


 


RBC   4.29 


 


Hgb   11.3 L 


 


Hct   34.9 L 


 


MCV   81.2 


 


MCH   26.2 


 


MCHC   32.3 


 


RDW   17.5 H 


 


Plt Count   271 


 


MPV   8.5 


 


Absolute Neuts (auto)   6.0 


 


Neutrophils %   75.1 


 


Lymphocytes %   11.3 


 


Monocytes %   9.8 


 


Eosinophils %   3.1 


 


Basophils %   0.7 


 


Nucleated RBC %   0 


 


Retic Count   


 


PT with INR  13.90 H  


 


INR  1.18 H  


 


PTT (Actin FS)  34.9  


 


Sodium    137


 


Potassium    4.8


 


Chloride    107


 


Carbon Dioxide    25


 


Anion Gap    4 L


 


BUN    26.3 H


 


Creatinine    1.3


 


Est GFR (CKD-EPI)AfAm    59.73


 


Est GFR (CKD-EPI)NonAf    51.53


 


Random Glucose    78


 


Calcium    8.6


 


Phosphorus   


 


Magnesium    2.2


 


Iron   


 


TIBC   


 


Iron Saturation   


 


Unsaturated IBC   


 


Ferritin   


 


Total Bilirubin    0.4


 


AST    32


 


ALT    12 L


 


Alkaline Phosphatase    101


 


Creatine Kinase    35


 


Troponin I    0.02


 


B-Natriuretic Peptide    6407.7 H


 


Total Protein    6.9


 


Albumin    2.8 L


 


Triglycerides   


 


Cholesterol   


 


Total LDL Cholesterol   


 


HDL Cholesterol   


 


TSH   


 


Urine Color   


 


Urine Appearance   


 


Urine pH   


 


Ur Specific Gravity   


 


Urine Protein   


 


Urine Glucose (UA)   


 


Urine Ketones   


 


Urine Blood   


 


Urine Nitrite   


 


Urine Bilirubin   


 


Urine Urobilinogen   


 


Ur Leukocyte Esterase   


 


Urine WBC (Auto)   


 


Urine RBC (Auto)   


 


Urine Casts (Auto)   


 


U Pathogenic Cast Auto   


 


U Epithel Cells (Auto)   


 


Urine Bacteria (Auto)   


 


Urine Yeast (Auto)   














  08/02/20 08/02/20 08/03/20





  19:35 21:28 01:06


 


WBC   


 


RBC   


 


Hgb   


 


Hct   


 


MCV   


 


MCH   


 


MCHC   


 


RDW   


 


Plt Count   


 


MPV   


 


Absolute Neuts (auto)   


 


Neutrophils %   


 


Lymphocytes %   


 


Monocytes %   


 


Eosinophils %   


 


Basophils %   


 


Nucleated RBC %   


 


Retic Count   


 


PT with INR   


 


INR   


 


PTT (Actin FS)   


 


Sodium   


 


Potassium   


 


Chloride   


 


Carbon Dioxide   


 


Anion Gap   


 


BUN   


 


Creatinine   


 


Est GFR (CKD-EPI)AfAm   


 


Est GFR (CKD-EPI)NonAf   


 


Random Glucose   


 


Calcium   


 


Phosphorus   


 


Magnesium   


 


Iron   


 


TIBC   


 


Iron Saturation   


 


Unsaturated IBC   


 


Ferritin   


 


Total Bilirubin   


 


AST   


 


ALT   


 


Alkaline Phosphatase   


 


Creatine Kinase   


 


Troponin I  0.02   0.02


 


B-Natriuretic Peptide   


 


Total Protein   


 


Albumin   


 


Triglycerides   


 


Cholesterol   


 


Total LDL Cholesterol   


 


HDL Cholesterol   


 


TSH   


 


Urine Color   Yellow 


 


Urine Appearance   Turbid 


 


Urine pH   6.0 


 


Ur Specific Gravity   1.015 


 


Urine Protein   1+ H 


 


Urine Glucose (UA)   Negative 


 


Urine Ketones   Negative 


 


Urine Blood   2+ H 


 


Urine Nitrite   Negative 


 


Urine Bilirubin   Negative 


 


Urine Urobilinogen   0.2 


 


Ur Leukocyte Esterase   3+ H 


 


Urine WBC (Auto)   1575 


 


Urine RBC (Auto)   132 


 


Urine Casts (Auto)   5 


 


U Pathogenic Cast Auto   None seen 


 


U Epithel Cells (Auto)   20 


 


Urine Bacteria (Auto)   5261 


 


Urine Yeast (Auto)   None seen 














  08/03/20 08/03/20 08/03/20





  06:05 06:05 06:05


 


WBC  7.8  


 


RBC  4.16  


 


Hgb  10.6 L  


 


Hct  33.8 L  


 


MCV  81.2  


 


MCH  25.5 L  


 


MCHC  31.4 L  


 


RDW  16.9 H  


 


Plt Count  255  


 


MPV  8.7  


 


Absolute Neuts (auto)   


 


Neutrophils %   


 


Lymphocytes %   


 


Monocytes %   


 


Eosinophils %   


 


Basophils %   


 


Nucleated RBC %   


 


Retic Count  Cancelled  


 


PT with INR   


 


INR   


 


PTT (Actin FS)   


 


Sodium   139 


 


Potassium   4.6 


 


Chloride   108 H 


 


Carbon Dioxide   25 


 


Anion Gap   6 L 


 


BUN   25.8 H 


 


Creatinine   1.3 


 


Est GFR (CKD-EPI)AfAm   59.73 


 


Est GFR (CKD-EPI)NonAf   51.53 


 


Random Glucose   75 


 


Calcium   8.3 L 


 


Phosphorus   3.2 


 


Magnesium   2.1 


 


Iron    30 L


 


TIBC    274


 


Iron Saturation    10 L


 


Unsaturated IBC    244


 


Ferritin    40.7


 


Total Bilirubin   0.5 


 


AST   28 


 


ALT   10 L 


 


Alkaline Phosphatase   89 


 


Creatine Kinase   


 


Troponin I   


 


B-Natriuretic Peptide   


 


Total Protein   6.2 L 


 


Albumin   2.7 L 


 


Triglycerides    58


 


Cholesterol    125


 


Total LDL Cholesterol    65


 


HDL Cholesterol    43


 


TSH   5.05 H 


 


Urine Color   


 


Urine Appearance   


 


Urine pH   


 


Ur Specific Gravity   


 


Urine Protein   


 


Urine Glucose (UA)   


 


Urine Ketones   


 


Urine Blood   


 


Urine Nitrite   


 


Urine Bilirubin   


 


Urine Urobilinogen   


 


Ur Leukocyte Esterase   


 


Urine WBC (Auto)   


 


Urine RBC (Auto)   


 


Urine Casts (Auto)   


 


U Pathogenic Cast Auto   


 


U Epithel Cells (Auto)   


 


Urine Bacteria (Auto)   


 


Urine Yeast (Auto)   














  08/03/20





  09:36


 


WBC 


 


RBC 


 


Hgb 


 


Hct 


 


MCV 


 


MCH 


 


MCHC 


 


RDW 


 


Plt Count 


 


MPV 


 


Absolute Neuts (auto) 


 


Neutrophils % 


 


Lymphocytes % 


 


Monocytes % 


 


Eosinophils % 


 


Basophils % 


 


Nucleated RBC % 


 


Retic Count  1.81 H


 


PT with INR 


 


INR 


 


PTT (Actin FS) 


 


Sodium 


 


Potassium 


 


Chloride 


 


Carbon Dioxide 


 


Anion Gap 


 


BUN 


 


Creatinine 


 


Est GFR (CKD-EPI)AfAm 


 


Est GFR (CKD-EPI)NonAf 


 


Random Glucose 


 


Calcium 


 


Phosphorus 


 


Magnesium 


 


Iron 


 


TIBC 


 


Iron Saturation 


 


Unsaturated IBC 


 


Ferritin 


 


Total Bilirubin 


 


AST 


 


ALT 


 


Alkaline Phosphatase 


 


Creatine Kinase 


 


Troponin I 


 


B-Natriuretic Peptide 


 


Total Protein 


 


Albumin 


 


Triglycerides 


 


Cholesterol 


 


Total LDL Cholesterol 


 


HDL Cholesterol 


 


TSH 


 


Urine Color 


 


Urine Appearance 


 


Urine pH 


 


Ur Specific Gravity 


 


Urine Protein 


 


Urine Glucose (UA) 


 


Urine Ketones 


 


Urine Blood 


 


Urine Nitrite 


 


Urine Bilirubin 


 


Urine Urobilinogen 


 


Ur Leukocyte Esterase 


 


Urine WBC (Auto) 


 


Urine RBC (Auto) 


 


Urine Casts (Auto) 


 


U Pathogenic Cast Auto 


 


U Epithel Cells (Auto) 


 


Urine Bacteria (Auto) 


 


Urine Yeast (Auto) 








                               Current Medications











Generic Name Dose Route Start Last Admin





  Trade Name Freq  PRN Reason Stop Dose Admin


 


Aspirin  81 mg  08/04/20 10:00 





  Asa -  PO  





  DAILY [facility]  


 


Atorvastatin Calcium  20 mg  08/02/20 22:00  08/02/20 22:29





  Lipitor -  PO   Not Given





  HS [facility]  


 


Carvedilol  3.125 mg  08/03/20 22:00 





  Coreg -  PO  





  BID [facility]  


 


Docusate Sodium  100 mg  08/02/20 22:00  08/03/20 09:43





  Colace -  PO   100 mg





  BID [facility]   Administration


 


Enoxaparin Sodium  40 mg  08/03/20 10:00  08/03/20 09:44





  Lovenox -  SQ   Not Given





  DAILY [facility]  


 


Ergocalciferol  50,000 unit  08/02/20 22:00  08/02/20 22:29





  Drisdol -  PO   Not Given





  Blanc@2200 [facility]  


 


Ferrous Sulfate  325 mg  08/02/20 22:00  08/03/20 09:43





  Feosol -  PO   325 mg





  BID [facility]   Administration


 


Furosemide  40 mg  08/02/20 21:45  08/03/20 11:13





  Lasix Injection -  IVPUSH   40 mg





  DAILY [facility]   Administration


 


Ceftriaxone Sodium 1 gm/  50 mls @ 100 mls/hr  08/03/20 10:00  08/03/20 11:13





  Dextrose  IVPB   100 mls/hr





  DAILY [facility]   Administration


 


Levothyroxine Sodium  100 mcg  08/03/20 07:00  08/03/20 06:33





  Synthroid -  PO   100 mcg





  DAILY@0700 [facility]   Administration


 


Tamsulosin HCl  0.4 mg  08/03/20 10:00  08/03/20 09:43





  Flomax -  PO   0.4 mg





  DAILY [facility]   Administration








                                Home Medications











 Medication  Instructions  Recorded


 


Amlodipine Besylate 5 mg PO DAILY 08/02/20


 


Atorvastatin Ca [Lipitor] 20 mg PO HS 08/02/20


 


Carvedilol [Coreg -] 3.125 mg PO BID 08/02/20


 


Docusate Sodium [Colace -] 100 mg PO BID 08/02/20


 


Ergocalciferol (Vitamin D2) 50,000 unit PO WEEKLY 08/02/20





[Vitamin D2]  


 


Ferrous Sulfate 325 mg PO BID 08/02/20


 


Levothyroxine [Synthroid -] 100 mcg PO DAILY 08/02/20


 


Metoprolol Succinate [Toprol Xl] 25 mg PO DAILY 08/02/20


 


Tamsulosin HCl 0.4 mg PO DAILY 08/02/20











ASSESSMENT/PLAN:





79 year old male with history of HTN, CHF, Aortic Stenosis (s/p bioprosthetic 

valve replacement), CAD s/p PCI/Stent, Hx TIA, GERD, BPH,  Urinary retention 

(with indwelling rios catheter), and Hypothyroidism presents with 2 day history

of intermittent chest pain, Le edema, orthopnea, SOB on exertion. Admits to non-

compliance with home meds including Lasix.








1. Acute on Chronic Systolic CHF decompensation


CXR - Cardiomegaly


BNP - 6407


Echo - global hypokinesis, EF 35%, Bioprosthetic AVR


Seen by Cardiology - to continue IV lasix diuresis, BB


For optimization of HF meds by Cardio.


LE edema, L>R - Duplex shows no evidence of DVT.





2. Hx Iron Deficiency Anemia


Iron Sat 10/ Feritin 40


On Fe SO4 supplementation


Hx of Hematuria - No evidence of current acute blood loss - GI nor 


For further out-patient work-up with GI and Urology.





3. Possible UTI (Catheter associated)


Urine Cx pending


Empiric Ceftriaxone pending Urine Cx.


Change Rios.





4. CAD - no signs of ACS


Continue Aspirin, BB, Statin.





5. Lightheadedness - etiology unclear. Will check orthostatics and CT Head. No 

acute neurological deficits.


PT eval.





6. HTN - Resume Coreg. Hold Norvasc. Confirm BP meds as home meds have Coreg and

metoprolol listed.





7. BPH with indwelling rios - continue Flomax.





8. HLD - Continiue Statin





9. Hypothyroidism - continue Synthroid, TSH 5.05.





DVT Px - Lovenox SQ

## 2020-08-03 NOTE — EKG
Test Reason : 

Blood Pressure : ***/*** mmHG

Vent. Rate : 063 BPM     Atrial Rate : 063 BPM

   P-R Int : 248 ms          QRS Dur : 168 ms

    QT Int : 492 ms       P-R-T Axes : 033 -88 067 degrees

   QTc Int : 503 ms

 

SINUS RHYTHM WITH 1ST DEGREE A-V BLOCK WITH PREMATURE ATRIAL COMPLEXES

POSSIBLE LEFT ATRIAL ENLARGEMENT

LEFT AXIS DEVIATION /LAFB

RIGHT BUNDLE BRANCH BLOCK

INFERIOR INFARCT , AGE UNDETERMINED

ANTEROLATERAL INFARCT , AGE UNDETERMINED

ABNORMAL ECG

NO PREVIOUS ECGS AVAILABLE

Confirmed by Carmen Shaikh ([id number]) on 8/3/2020 9:57:36 AM

 

Referred By:             Confirmed By:Carmen Shaikh

## 2020-08-04 LAB
APPEARANCE UR: CLEAR
BACTERIA # UR AUTO: 48 /UL (ref 0–1359)
BILIRUB UR STRIP.AUTO-MCNC: NEGATIVE MG/DL
CASTS URNS QL MICRO: 6 /UL (ref 0–3.1)
COLOR UR: YELLOW
EPITH CASTS URNS QL MICRO: >36 /UL (ref 0–25.1)
KETONES UR QL STRIP: NEGATIVE
LEUKOCYTE ESTERASE UR QL STRIP.AUTO: (no result)
NITRITE UR QL STRIP: NEGATIVE
PH UR: 6 [PH] (ref 5–8)
PROT UR QL STRIP: (no result)
PROT UR QL STRIP: NEGATIVE
RBC # BLD AUTO: 92 /UL (ref 0–23.9)
SP GR UR: 1.01 (ref 1.01–1.03)
UROBILINOGEN UR STRIP-MCNC: 0.2 MG/DL (ref 0.2–1)
WBC # UR AUTO: 68 /UL (ref 0–25.8)

## 2020-08-04 RX ADMIN — DOCUSATE SODIUM SCH: 100 CAPSULE, LIQUID FILLED ORAL at 10:35

## 2020-08-04 RX ADMIN — DOCUSATE SODIUM SCH MG: 100 CAPSULE, LIQUID FILLED ORAL at 10:23

## 2020-08-04 RX ADMIN — DOCUSATE SODIUM SCH MG: 100 CAPSULE, LIQUID FILLED ORAL at 21:42

## 2020-08-04 RX ADMIN — Medication SCH: at 12:07

## 2020-08-04 RX ADMIN — FUROSEMIDE SCH MG: 10 INJECTION, SOLUTION INTRAVENOUS at 10:23

## 2020-08-04 RX ADMIN — ATORVASTATIN CALCIUM SCH MG: 20 TABLET, FILM COATED ORAL at 21:43

## 2020-08-04 RX ADMIN — ENOXAPARIN SODIUM SCH MG: 40 INJECTION SUBCUTANEOUS at 10:24

## 2020-08-04 RX ADMIN — ASPIRIN 81 MG SCH MG: 81 TABLET ORAL at 10:23

## 2020-08-04 RX ADMIN — FERROUS SULFATE TAB EC 324 MG (65 MG FE EQUIVALENT) SCH MG: 324 (65 FE) TABLET DELAYED RESPONSE at 10:23

## 2020-08-04 RX ADMIN — LEVOTHYROXINE SODIUM SCH: 100 TABLET ORAL at 10:23

## 2020-08-04 RX ADMIN — CARVEDILOL SCH MG: 3.12 TABLET, FILM COATED ORAL at 21:43

## 2020-08-04 RX ADMIN — CARVEDILOL SCH MG: 3.12 TABLET, FILM COATED ORAL at 10:23

## 2020-08-04 RX ADMIN — FERROUS SULFATE TAB EC 324 MG (65 MG FE EQUIVALENT) SCH: 324 (65 FE) TABLET DELAYED RESPONSE at 10:36

## 2020-08-04 RX ADMIN — ENOXAPARIN SODIUM SCH: 40 INJECTION SUBCUTANEOUS at 10:36

## 2020-08-04 RX ADMIN — TAMSULOSIN HYDROCHLORIDE SCH MG: 0.4 CAPSULE ORAL at 10:23

## 2020-08-04 RX ADMIN — FERROUS SULFATE TAB EC 324 MG (65 MG FE EQUIVALENT) SCH MG: 324 (65 FE) TABLET DELAYED RESPONSE at 21:41

## 2020-08-04 RX ADMIN — CEFTRIAXONE SCH MLS/HR: 1 INJECTION, POWDER, FOR SOLUTION INTRAMUSCULAR; INTRAVENOUS at 10:24

## 2020-08-04 NOTE — PN
Teaching Attending Note


Name of Resident: Gilles Castillo





ATTENDING PHYSICIAN STATEMENT





I saw and evaluated the patient.


I reviewed the resident's note and discussed the case with the resident.


I agree with the resident's findings and plan as documented.








SUBJECTIVE:


Seen and examined at bedside.  Patient reports he feels weak.  Has LLE pain 

which appears muscular on exam.  2+ LE edema Satting in the high 90s on room 

air.  Has poor air movement.





OBJECTIVE:


                                Last Vital Signs











Temp Pulse Resp BP Pulse Ox


 


 98.1 F   74   20   104/56 L  97 


 


 08/04/20 13:39  08/04/20 13:39  08/04/20 10:49  08/04/20 13:39  08/04/20 10:49





PE: per resident note


Labs/Imaging: reviewed





ASSESSMENT AND PLAN:


79 year old male with history of HTN, CHF, Aortic Stenosis (s/p bioprosthetic 

valve replacement), CAD s/p PCI/Stent, Hx TIA, GERD, BPH,  Urinary retention 

(with indwelling swenson catheter), and Hypothyroidism presents with 2 day history

of intermittent chest pain, Le edema, orthopnea, SOB on exertion. Admits to non-

compliance with home meds including Lasix.








# Acute on Chronic Systolic CHF decompensation.


Pt remains hypervolemic: requires additional inpt diuresis


CXR - Cardiomegaly


BNP - 6407


Echo - global hypokinesis, EF 35%, Bioprosthetic AVR


Seen by Cardiology - to continue IV lasix diuresis, BB


For optimization of HF meds by Cardio.


LE edema, L>R - Duplex shows no evidence of DVT.





#Hx Iron Deficiency Anemia


Iron Sat 10/ Feritin 40


On Fe SO4 supplementation


Hx of Hematuria - No evidence of current acute blood loss - GI nor 


For further out-patient work-up with GI and Urology.





# Possible UTI (Catheter associated)


Urine Cx shows pseudomonas


will replace swenson and reculture 


DC abx for now


ID on board: appreciate recs





# CAD - no signs of ACS


Continue Aspirin, BB, Statin.





# Lightheadedness - etiology unclear. Will check orthostatics and CT Head. No 

acute neurological deficits.


PT eval.





# HTN - Resume Coreg. Hold Norvasc. Confirm BP meds as home meds have Coreg and 

metoprolol listed.





# BPH with indwelling swenson - continue Flomax.


# HLD - Continiue Statin





# Hypothyroidism - continue Synthroid, TSH 5.05.





DVT Px - Lovenox SQ

## 2020-08-04 NOTE — PN
Progress Note (short form)





- Note


Progress Note: 


cc: le edema, abd pain





s: edema, dyspnea improvign. no chest pain, palps, dizziness








                               Current Medications











Generic Name Dose Route Start Last Admin





  Trade Name Shannan  PRN Reason Stop Dose Admin


 


Ascorbic Acid  250 mg  08/04/20 12:00 





  Vitamin C -  PO  





  DAILY [facility]  


 


Aspirin  81 mg  08/04/20 10:00  08/04/20 10:23





  Asa -  PO   81 mg





  DAILY [facility]   Administration


 


Atorvastatin Calcium  20 mg  08/02/20 22:00  08/03/20 21:04





  Lipitor -  PO   20 mg





  HS [facility]   Administration


 


Carvedilol  3.125 mg  08/03/20 22:00  08/04/20 10:23





  Coreg -  PO   3.125 mg





  BID [facility]   Administration


 


Docusate Sodium  100 mg  08/02/20 22:00  08/04/20 10:35





  Colace -  PO   Not Given





  BID [facility]  


 


Enoxaparin Sodium  40 mg  08/03/20 10:00  08/04/20 10:36





  Lovenox -  SQ   Not Given





  DAILY [facility]  


 


Ergocalciferol  50,000 unit  08/02/20 22:00  08/02/20 22:29





  Drisdol -  PO   Not Given





  Blanc@2200 [facility]  


 


Ferrous Sulfate  325 mg  08/02/20 22:00  08/04/20 10:36





  Feosol -  PO   Not Given





  BID [facility]  


 


Furosemide  40 mg  08/02/20 21:45  08/04/20 10:23





  Lasix Injection -  IVPUSH   40 mg





  DAILY [facility]   Administration


 


Levothyroxine Sodium  100 mcg  08/03/20 07:00  08/04/20 10:23





  Synthroid -  PO   Not Given





  DAILY@0700 [facility]  


 


Tamsulosin HCl  0.4 mg  08/03/20 10:00  08/04/20 10:23





  Flomax -  PO   0.4 mg





  DAILY [facility]   Administration





                                   Vital Signs











 Period  Temp  Pulse  Resp  BP Sys/Villa  Pulse Ox


 


 Last 24 Hr  97.8 F-98.3 F  63-74  18-20  127-149/64-88  











nad no jvd


rrr s1s2 no mrg


cta bl nl eff


aao3


1-2+ le edema bl, no c/c


abd nt nd pos bs


no jaundice diaphoresis


+dp pt no carotid bruits


not agitated





echo 8/2020:  lvef 35%, global hk, nl rv, lae, nl bio avr, mild mr, mild tr, 

rvsp 38





ecg: sr, rbbb





cxr: clear





tele: sr








a/p:  79 m hx htn, syst chf, as s/p remote bio avr, cad s/p remote pci, tia, 

here with le edema, abd pain.





acute syst chf:


-continue iv lasix, monitor daily chem7, wts


-resumed home coreg for chf regimen


-no signs acs, trops negx3





htn:


-cont coreg, monitor





avr:


-stable, nl fcn on echo





cad, remote pci:


-stable, no signs acs


-cont statin, bb, asa





uti:


-on abx

## 2020-08-04 NOTE — PN
Physical Exam: 


SUBJECTIVE:  No overnight events.Patient seen and examined. Complains of LLE 

pain.  





OBJECTIVE:





                                   Vital Signs











 Period  Temp  Pulse  Resp  BP Sys/Villa  Pulse Ox


 


 Last 24 Hr  97.8 F-98.3 F  63-74  18-20  104-149/56-86  











GENERAL: The patient is awake, alert, and oriented X 2, in no acute distress.


HEENT: Normal with no signs of trauma. No ptosis. Ears normal, nares patent, 

oropharynx clear without exudates, moist mucous membranes.


LUNGS: decreased Breath sounds at bases, clear to auscultation bilaterally, no 

wheezes, no crackles, no accessory muscle use. 


HEART: Regular rate and rhythm, S1, S2 without murmur, rub or gallop.systolic 

murmur 


ABDOMEN: Soft, nontender, nondistended, normoactive bowel sounds,


EXTREMITIES: 2+ edema b/l LE. edema increased more on L. skin changes associated

with venous stasis on L LE. 


NEUROLOGICAL: Normal speech, gait not observed.


PSYCH: normal affect.


SKIN: Warm, dry, normal turgor, no rashes or lesions noted


BACK: Stage 1 ulcer in sacral decubitus ulcer














                         Laboratory Results - last 24 hr











  08/03/20 08/03/20





  01:06 06:05


 


Iron   30 L


 


TIBC   274


 


Iron Saturation   10 L


 


Unsaturated IBC   244


 


Ferritin   40.7


 


Triglycerides   58


 


Cholesterol   125


 


Total LDL Cholesterol   65


 


HDL Cholesterol   43


 


Vitamin B12   191 L


 


Serum Folate   10


 


COVID-19 ([facility])  Not detected 








Active Medications











Generic Name Dose Route Start Last Admin





  Trade Name Freq  PRN Reason Stop Dose Admin


 


Ascorbic Acid  250 mg  08/04/20 12:00  08/04/20 12:07





  Vitamin C -  PO   Not Given





  DAILY [facility]  


 


Aspirin  81 mg  08/04/20 10:00  08/04/20 10:23





  Asa -  PO   81 mg





  DAILY [facility]   Administration


 


Atorvastatin Calcium  20 mg  08/02/20 22:00  08/03/20 21:04





  Lipitor -  PO   20 mg





  HS [facility]   Administration


 


Carvedilol  3.125 mg  08/03/20 22:00  08/04/20 10:23





  Coreg -  PO   3.125 mg





  BID [facility]   Administration


 


Docusate Sodium  100 mg  08/02/20 22:00  08/04/20 10:35





  Colace -  PO   Not Given





  BID [facility]  


 


Enoxaparin Sodium  40 mg  08/03/20 10:00  08/04/20 10:36





  Lovenox -  SQ   Not Given





  DAILY [facility]  


 


Ergocalciferol  50,000 unit  08/02/20 22:00  08/02/20 22:29





  Drisdol -  PO   Not Given





  Blanc@2200 [facility]  


 


Ferrous Sulfate  325 mg  08/02/20 22:00  08/04/20 10:36





  Feosol -  PO   Not Given





  BID [facility]  


 


Furosemide  40 mg  08/02/20 21:45  08/04/20 10:23





  Lasix Injection -  IVPUSH   40 mg





  DAILY [facility]   Administration


 


Levothyroxine Sodium  100 mcg  08/03/20 07:00  08/04/20 10:23





  Synthroid -  PO   Not Given





  DAILY@0700 [facility]  


 


Tamsulosin HCl  0.4 mg  08/03/20 10:00  08/04/20 10:23





  Flomax -  PO   0.4 mg





  DAILY [facility]   Administration











ASSESSMENT/PLAN:


78 YO PMH BPH, urinary retention w/ indwelling swenson catheter, LE edema HTN, 

CHF, aortic stenosis (s/p bioprosthetic valve replacement), CAD s/p PCI/Stent, 

GERD, and TIA hx p/w 2 days of iintermittent CP. SOB, orthopnea, and LE edema 

worsened over the past 6 months 2/2 noncompliance with home meds. 





#Chest pain


EKG: NSR. 1st degree AV block w/ premature atrial complexes. R bundle branch 

block. 


trops negative X3 





#Acute on Chronic Systolic CHF 


-CXR: Cardiomegaly, BNP - 6407


-Echo: global hypokinesis, EF 35%


-C/w IV lasix & coreg 


-LE edema more on the L LE. Duplex LE: no evidence of DVT





 #UTI 


-UA: 1+ protein, 2+ blood, 132 RBCs, 3+ leukoesterase, 1575 WBcs,  5261 

bacteria, epithelial cells 20


-Urine Cx pseudomonas > 100,00 CFU. s/p Ceftriaxone 1 gm 


-ID C/S appreciated. replace indwelling swenson catheter. order repeat urine cx 

after swenson catheter replaced, as per ID 





#Lightheadedness/blurry vision 


-CTH: no acute pathology. multiple chronic supratentorial & infratentorial 

infarcts


-check orthostatics in AM  





#Stage 1 ulcer


-frequent positioning every 2 hrs, out of bed as tolerated 


-air flow mattress ordered 





#Iron Deficiency Anemia


LOWIron 30, Iron Sat 10        NORMAL ferritin 40.7, TIBC 274 


-home dose of ferrous sulfate 325 PO BID 


-started vitamin C 250 mg PO daily 


h/o hematuria. outpt f/u w/ Urology & GI





#CAD


-c/w ASA, atorvastatin 20 mg PO HS, coreg 3.125 mg PO BID 





#HTN 


c/w coreg 





#BPH 


c/w flomax 





#HLD


-c.w atorvastatin 20 mg PO HS





#Hypothyroidism


continue Synthroid 100 mcg PO DAILY 





#DVT PPX


Lovenox 40 mg SQ





#DISPO


maintain med surg 














Visit type





- Emergency Visit


Emergency Visit: Yes


ED Registration Date: 08/02/20


Care time: The patient presented to the Emergency Department on the above date 

and was hospitalized for further evaluation of their emergent condition.





- New Patient


This patient is new to me today: No





- Critical Care


Critical Care patient: No





- Medication Review


Med list reviewed for High Risk Meds patients 65 and older: Yes





ATTENDING PHYSICIAN STATEMENT





I saw and evaluated the patient.


I reviewed the resident's note and discussed the case with the resident.


I agree with the resident's findings and plan as documented.








SUBJECTIVE:








OBJECTIVE:








ASSESSMENT AND PLAN:

## 2020-08-04 NOTE — PN
Progress Note (short form)





- Note


Progress Note: 





ID CONSULT DICTATED


+ URINE C/S PSEUDOMONAS IN PT


   WITH CHRONIC INDWELLING CATHETER FOR BPH


? SIGNIFICANCE





PT NON TOXIC APPEARING


NO EVIDENCE OF SYSTEMIC INFECTION


AFEBRILE, NORMAL WBC





CHANGE BOLAÑOS, REPEAT U/A, C/S


OBSERVE OFF ANTIBIOTICS

## 2020-08-04 NOTE — CONS
DATE OF CONSULTATION:  

 

DATE OF DICTATION:  2020

 

CHIEF COMPLAINT:  The patient is an 79-year-old male who is evaluated for positive

urine culture.  The patient does not give a reliable history.  He recently became a

.  His wife  approximately 2-1/2 weeks ago.  Since that time he has been

despondent and had admitted to noncompliance with his medication.  He is from

[place]; however, moved to [place] to be with his daughters.  He is now admitted

with 2-day history of chest pain.  He was admitted to the telemetry floor and was

evaluated by cardiology.  

 

The patient has an indwelling Rios catheter.  According to the notes, it was placed

on 2020, at a facility in [place] due to acute urinary retention from

BPH.  Urinalysis shows many white cells.  Urine culture is growing a pseudomonas

species.  He was empirically treated with ceftriaxone.  At the present time he is

awake and alert.  He has no complaints of suprapubic or flank pain. The urine in the

Rios collection bag has been clear.  He denies any fever or chills.  Denies prior

history of urinary tract infection or history of multidrug-resistant organisms. 

Patient reportedly had 1 episode of gross hematuria which has since resolved. 

 

PAST MEDICAL HISTORY:  Positive for hypertension, hyperlipidemia, congestive heart

failure, TIA, gastroesophageal reflux, BPH, aortic stenosis, coronary artery disease.

 

PAST SURGICAL HISTORY:  Status post coronary artery stent, aortic valve replacement

with a bioprosthetic valve approximately 10 years ago.  

 

ALLERGIES:  No known allergies.  

 

MEDICATIONS:  Include Zosyn, Flomax, Lovenox, Lipitor, Coreg, aspirin, Lasix, Colace,

Synthroid. 

 

SOCIAL HISTORY:  As per HPI.  Nonsmoker, nondrinker. 

 

REVIEW OF SYSTEMS:  

Neurologic:  No loss of consciousness, seizure activity, focal weakness.  

Cardiac:  As per HPI.

Respiratory:  Negative cough or sputum production.

Gastrointestinal:  Negative vomiting or diarrhea.

Genitourinary:  As per HPI. 

 

LABORATORY DATA:  White count 7.8, hematocrit 33.8, platelet count 255, creatinine

1.3.  Urinalysis:  White cells 1575, 132 red cells.  Chest x-ray negative.  Urine

culture growing pseudomonas species.  COVID-19 PCR negative.  

 

PHYSICAL EXAMINATION:  

General:  He is out of bed to chair.  

Vital Signs:  Temperature 98.3, blood pressure 127/64, pulse 66, regular. 

Respirations 20 per minute.  Patient is nontoxic appearing, in no acute distress.  

Cardiac:  Heart sounds S1, S2.  

Lungs:  Clear.

Abdomen:  Soft, nontender.  No suprapubic or flank tenderness.

Extremities:  Positive for edema.  

Genitourinary:  Urine in Rios collection bag appears clear.  

 

IMPRESSION:  Positive urine culture pseudomonas species in patient with chronic

indwelling Rios catheter for benign prostatic hypertrophy.  Significance of positive

urine culture not clear.  Patient is not toxic appearing.  No evidence of systemic

infection.  He is afebrile with a normal white blood cell count.  At this time advise

change of Rios catheter, repeat urinalysis and urine culture.  Observe off

antibiotics at this time.  Should he develop any fever or other change in his

clinical condition, will start antibiotics, but for now will observe.  Case discussed

with primary care physician.

 

Thank you for the kind referral.  

 

 

RENATA BRUNER M.D.

 

[id number]

DD: 2020 12:38

DT: 2020 13:30

Job #:  [id number]

## 2020-08-05 LAB
ALBUMIN SERPL-MCNC: 2.7 G/DL (ref 3.4–5)
ALP SERPL-CCNC: 82 U/L (ref 45–117)
ALT SERPL-CCNC: 11 U/L (ref 13–61)
ANION GAP SERPL CALC-SCNC: 8 MMOL/L (ref 8–16)
AST SERPL-CCNC: 26 U/L (ref 15–37)
BASOPHILS # BLD: 0.8 % (ref 0–2)
BILIRUB SERPL-MCNC: 0.2 MG/DL (ref 0.2–1)
BUN SERPL-MCNC: 26.6 MG/DL (ref 7–18)
CALCIUM SERPL-MCNC: 8.2 MG/DL (ref 8.5–10.1)
CHLORIDE SERPL-SCNC: 105 MMOL/L (ref 98–107)
CO2 SERPL-SCNC: 26 MMOL/L (ref 21–32)
CREAT SERPL-MCNC: 1.3 MG/DL (ref 0.55–1.3)
DEPRECATED RDW RBC AUTO: 16.9 % (ref 11.9–15.9)
EOSINOPHIL # BLD: 3.6 % (ref 0–4.5)
GLUCOSE SERPL-MCNC: 76 MG/DL (ref 74–106)
HCT VFR BLD CALC: 33.7 % (ref 35.4–49)
HGB BLD-MCNC: 10.9 GM/DL (ref 11.7–16.9)
LYMPHOCYTES # BLD: 13.7 % (ref 8–40)
MAGNESIUM SERPL-MCNC: 2 MG/DL (ref 1.8–2.4)
MCH RBC QN AUTO: 26 PG (ref 25.7–33.7)
MCHC RBC AUTO-ENTMCNC: 32.2 G/DL (ref 32–35.9)
MCV RBC: 80.5 FL (ref 80–96)
MONOCYTES # BLD AUTO: 10.3 % (ref 3.8–10.2)
NEUTROPHILS # BLD: 71.6 % (ref 42.8–82.8)
PHOSPHATE SERPL-MCNC: 3.1 MG/DL (ref 2.5–4.9)
PLATELET # BLD AUTO: 264 K/MM3 (ref 134–434)
PMV BLD: 8.9 FL (ref 7.5–11.1)
POTASSIUM SERPLBLD-SCNC: 4.5 MMOL/L (ref 3.5–5.1)
PROT SERPL-MCNC: 6.4 G/DL (ref 6.4–8.2)
RBC # BLD AUTO: 4.18 M/MM3 (ref 4–5.6)
SODIUM SERPL-SCNC: 138 MMOL/L (ref 136–145)
WBC # BLD AUTO: 7 K/MM3 (ref 4–10)

## 2020-08-05 RX ADMIN — CARVEDILOL SCH MG: 3.12 TABLET, FILM COATED ORAL at 10:17

## 2020-08-05 RX ADMIN — PANTOPRAZOLE SODIUM SCH: 20 TABLET, DELAYED RELEASE ORAL at 13:21

## 2020-08-05 RX ADMIN — ASPIRIN 81 MG SCH MG: 81 TABLET ORAL at 10:17

## 2020-08-05 RX ADMIN — Medication SCH: at 10:18

## 2020-08-05 RX ADMIN — FERROUS SULFATE TAB EC 324 MG (65 MG FE EQUIVALENT) SCH: 324 (65 FE) TABLET DELAYED RESPONSE at 21:18

## 2020-08-05 RX ADMIN — LEVOTHYROXINE SODIUM SCH MCG: 100 TABLET ORAL at 06:56

## 2020-08-05 RX ADMIN — TAMSULOSIN HYDROCHLORIDE SCH MG: 0.4 CAPSULE ORAL at 10:17

## 2020-08-05 RX ADMIN — LISINOPRIL SCH: 5 TABLET ORAL at 13:21

## 2020-08-05 RX ADMIN — FUROSEMIDE SCH MG: 10 INJECTION, SOLUTION INTRAVENOUS at 10:17

## 2020-08-05 RX ADMIN — DOCUSATE SODIUM SCH: 100 CAPSULE, LIQUID FILLED ORAL at 21:21

## 2020-08-05 RX ADMIN — ENOXAPARIN SODIUM SCH: 40 INJECTION SUBCUTANEOUS at 10:18

## 2020-08-05 RX ADMIN — DOCUSATE SODIUM SCH: 100 CAPSULE, LIQUID FILLED ORAL at 10:18

## 2020-08-05 RX ADMIN — ATORVASTATIN CALCIUM SCH: 20 TABLET, FILM COATED ORAL at 21:18

## 2020-08-05 RX ADMIN — FERROUS SULFATE TAB EC 324 MG (65 MG FE EQUIVALENT) SCH: 324 (65 FE) TABLET DELAYED RESPONSE at 10:18

## 2020-08-05 RX ADMIN — CARVEDILOL SCH: 3.12 TABLET, FILM COATED ORAL at 21:18

## 2020-08-05 NOTE — PN
Physical Exam: 


SUBJECTIVE: No overnight events. Patient seen and examined. C/o L leg pain 

again. Also c/o burning epigastric pain after meals 








OBJECTIVE:





                                   Vital Signs











 Period  Temp  Pulse  Resp  BP Sys/Villa  Pulse Ox


 


 Last 24 Hr  97.6 F-98.7 F  63-86  20-20  112-141/65-84  96-98











GENERAL: The patient is awake, alert, and oriented X 2, in no acute distress.


HEENT: Normal with no signs of trauma. No ptosis. Ears normal, nares patent, 

oropharynx clear without exudates, moist mucous membranes.


LUNGS: decreased Breath sounds at bases, clear to auscultation bilaterally, no 

wheezes, no crackles, no accessory muscle use. 


HEART: Regular rate and rhythm, S1, S2 without murmur, rub or gallop.systolic 

murmur 


ABDOMEN: Soft, nontender, nondistended, normoactive bowel sounds,


EXTREMITIES: 2+ edema b/l LE; improving. skin changes associated with venous 

stasis on L LE. L Leg non-TTP. 


NEUROLOGICAL: Normal speech, gait not observed.


PSYCH: normal affect.


SKIN: Warm, dry, normal turgor, no rashes or lesions noted


BACK: Stage 1 ulcer in sacral decubitus ulcer











                         Laboratory Results - last 24 hr











  08/04/20 08/05/20 08/05/20





  16:05 05:25 05:25


 


WBC    7.0


 


RBC    4.18


 


Hgb    10.9 L


 


Hct    33.7 L


 


MCV    80.5


 


MCH    26.0


 


MCHC    32.2


 


RDW    16.9 H


 


Plt Count    264


 


MPV    8.9


 


Absolute Neuts (auto)    5.0


 


Neutrophils %    71.6


 


Lymphocytes %    13.7  D


 


Monocytes %    10.3 H


 


Eosinophils %    3.6


 


Basophils %    0.8


 


Nucleated RBC %    0


 


Sodium   138 


 


Potassium   4.5 


 


Chloride   105 


 


Carbon Dioxide   26 


 


Anion Gap   8 


 


BUN   26.6 H 


 


Creatinine   1.3 


 


Est GFR (CKD-EPI)AfAm   59.73 


 


Est GFR (CKD-EPI)NonAf   51.53 


 


Random Glucose   76 


 


Calcium   8.2 L 


 


Phosphorus   3.1 


 


Magnesium   2.0 


 


Total Bilirubin   0.2 


 


AST   26 


 


ALT   11 L 


 


Alkaline Phosphatase   82 


 


Total Protein   6.4 


 


Albumin   2.7 L 


 


Urine Color  Yellow  


 


Urine Appearance  Clear  


 


Urine pH  6.0  


 


Ur Specific Gravity  1.010  


 


Urine Protein  Trace  


 


Urine Glucose (UA)  Negative  


 


Urine Ketones  Negative  


 


Urine Blood  2+ H  


 


Urine Nitrite  Negative  


 


Urine Bilirubin  Negative  


 


Urine Urobilinogen  0.2  


 


Ur Leukocyte Esterase  2+ H  


 


Urine WBC (Auto)  68  


 


Urine RBC (Auto)  92  


 


Urine Casts (Auto)  6  


 


U Pathogenic Cast Auto  None  


 


U Epithel Cells (Auto)  >36  


 


U Sm Round Cell (Auto)  None  


 


Urine Bacteria (Auto)  48  








Active Medications











Generic Name Dose Route Start Last Admin





  Trade Name Freq  PRN Reason Stop Dose Admin


 


Ascorbic Acid  250 mg  08/04/20 12:00  08/05/20 10:18





  Vitamin C -  PO   Not Given





  DAILY [facility]  


 


Aspirin  81 mg  08/04/20 10:00  08/05/20 10:17





  Asa -  PO   81 mg





  DAILY [facility]   Administration


 


Atorvastatin Calcium  20 mg  08/02/20 22:00  08/04/20 21:43





  Lipitor -  PO   20 mg





  HS [facility]   Administration


 


Carvedilol  3.125 mg  08/03/20 22:00  08/05/20 10:17





  Coreg -  PO   3.125 mg





  BID [facility]   Administration


 


Docusate Sodium  100 mg  08/02/20 22:00  08/05/20 10:18





  Colace -  PO   Not Given





  BID [facility]  


 


Enoxaparin Sodium  40 mg  08/03/20 10:00  08/05/20 10:18





  Lovenox -  SQ   Not Given





  DAILY [facility]  


 


Ergocalciferol  50,000 unit  08/02/20 22:00  08/02/20 22:29





  Drisdol -  PO   Not Given





  Blanc@2200 [facility]  


 


Ferrous Sulfate  325 mg  08/02/20 22:00  08/05/20 10:18





  Feosol -  PO   Not Given





  BID [facility]  


 


Furosemide  40 mg  08/02/20 21:45  08/05/20 10:17





  Lasix Injection -  IVPUSH   40 mg





  DAILY [facility]   Administration


 


Levothyroxine Sodium  100 mcg  08/03/20 07:00  08/05/20 06:56





  Synthroid -  PO   100 mcg





  DAILY@0700 [facility]   Administration


 


Lisinopril  2.5 mg  08/05/20 12:00  08/05/20 13:21





  Prinivil  PO   Not Given





  DAILY [facility]  


 


Pantoprazole Sodium  20 mg  08/05/20 13:00  08/05/20 13:21





  Protonix -  PO   Not Given





  DAILY [facility]  


 


Tamsulosin HCl  0.4 mg  08/03/20 10:00  08/05/20 10:17





  Flomax -  PO   0.4 mg





  DAILY [facility]   Administration











ASSESSMENT/PLAN:


80 YO PMH BPH, urinary retention w/ indwelling swenson catheter, LE edema HTN, 

CHF, aortic stenosis (s/p bioprosthetic valve replacement), CAD s/p PCI/Stent, 

GERD, and TIA hx p/w 2 days of iintermittent CP. SOB, orthopnea, and LE edema 

worsened over the past 6 months 2/2 noncompliance with home meds. 





#Chest pain


EKG: NSR. 1st degree AV block w/ premature atrial complexes. R bundle branch 

block. 


trops negative X3 


resolved





#Acute on Chronic Systolic CHF 


-CXR: Cardiomegaly, BNP - 6407


-Echo: global hypokinesis, EF 35%


- Duplex LE: no evidence of DVT


-Cardio c/s appreciated. start lisinopril 2.5 mg daily


-C/w IV lasix & coreg. 





 #UTI 


-UA: 1+ protein, 2+ blood, 132 RBCs, 3+ leukoesterase, 1575 WBcs,  5261 

bacteria, epithelial cells 20


-Urine Cx pseudomonas > 100,00 CFU. s/p Ceftriaxone 1 gm 


-indwelling swenson catheter replaced. f/u repeat urine cx after swenson was 

replaced as per ID





#Stage 1 ulcer


-frequent positioning every 2 hrs, out of bed as tolerated 


-air flow mattress ordered 





#Iron Deficiency Anemia


LOWIron 30, Iron Sat 10        NORMAL ferritin 40.7, TIBC 274 


-home dose of ferrous sulfate 325 PO BID 


-started vitamin C 250 mg PO daily 


h/o hematuria. outpt f/u w/ Urology & GI





#GERD


Start Protonix 20 mg Daily





#Lightheadedness/blurry vision 


-CTH: no acute pathology. multiple chronic supratentorial & infratentorial 

infarcts


-resolved





#CAD


-c/w ASA, atorvastatin 20 mg PO HS, coreg 3.125 mg PO BID 





#HTN 


c/w coreg 





#BPH 


c/w flomax 





#HLD


-c.w atorvastatin 20 mg PO HS





#Hypothyroidism


continue Synthroid 100 mcg PO DAILY 





#DVT PPX


Lovenox 40 mg SQ





#DISPO


maintain med surg 














Visit type





- Emergency Visit


Emergency Visit: Yes


ED Registration Date: 08/02/20


Care time: The patient presented to the Emergency Department on the above date 

and was hospitalized for further evaluation of their emergent condition.





- New Patient


This patient is new to me today: No





- Critical Care


Critical Care patient: No





- Medication Review


Med list reviewed for High Risk Meds patients 65 and older: Yes





ATTENDING PHYSICIAN STATEMENT





I saw and evaluated the patient.


I reviewed the resident's note and discussed the case with the resident.


I agree with the resident's findings and plan as documented.








SUBJECTIVE:








OBJECTIVE:








ASSESSMENT AND PLAN:

## 2020-08-05 NOTE — PN
Progress Note (short form)





- Note


Progress Note: 


cc: le edema, abd pain





s: edema improving. no chest pain, palps, dizziness. complains of nausea.








                              Current Medications











Generic Name Dose Route Start Last Admin





  Trade Name Shannan  PRN Reason Stop Dose Admin


 


Ascorbic Acid  250 mg  08/04/20 12:00  08/05/20 10:18





  Vitamin C -  PO   Not Given





  DAILY [facility]  


 


Aspirin  81 mg  08/04/20 10:00  08/05/20 10:17





  Asa -  PO   81 mg





  DAILY [facility]   Administration


 


Atorvastatin Calcium  20 mg  08/02/20 22:00  08/04/20 21:43





  Lipitor -  PO   20 mg





  [facility]   Administration


 


Carvedilol  3.125 mg  08/03/20 22:00  08/05/20 10:17





  Coreg -  PO   3.125 mg





  BID [facility]   Administration


 


Docusate Sodium  100 mg  08/02/20 22:00  08/05/20 10:18





  Colace -  PO   Not Given





  BID [facility]  


 


Enoxaparin Sodium  40 mg  08/03/20 10:00  08/05/20 10:18





  Lovenox -  SQ   Not Given





  DAILY [facility]  


 


Ergocalciferol  50,000 unit  08/02/20 22:00  08/02/20 22:29





  Drisdol -  PO   Not Given





  Blanc@2200 [facility]  


 


Ferrous Sulfate  325 mg  08/02/20 22:00  08/05/20 10:18





  Feosol -  PO   Not Given





  BID [facility]  


 


Furosemide  40 mg  08/02/20 21:45  08/05/20 10:17





  Lasix Injection -  IVPUSH   40 mg





  DAILY [facility]   Administration


 


Levothyroxine Sodium  100 mcg  08/03/20 07:00  08/05/20 06:56





  Synthroid -  PO   100 mcg





  DAILY@0700 [facility]   Administration


 


Tamsulosin HCl  0.4 mg  08/03/20 10:00  08/05/20 10:17





  Flomax -  PO   0.4 mg





  DAILY [facility]   Administration








                                   Vital Signs











 Period  Temp  Pulse  Resp  BP Sys/Villa  Pulse Ox


 


 Last 24 Hr  97.8 F-98.4 F  63-86  20-20  104-141/56-84  96-98











nad no jvd


rrr s1s2 no mrg


cta bl nl eff


aao3


1-2+ le edema bl, no c/c


abd nt nd pos bs


no jaundice diaphoresis


+dp pt no carotid bruits


not agitated





echo 8/2020:  lvef 35%, global hk, nl rv, lae, nl bio avr, mild mr, mild tr, 

rvsp 38





ecg: sr, rbbb





cxr: clear





tele: sr








a/p:  79 m hx htn, syst chf, as s/p remote bio avr, cad s/p remote pci, tia, 

here with le edema, abd pain.





acute syst chf:


-edema improving. continue iv lasix, monitor daily chem7, wts


-resumed home coreg for chf regimen, start lisinopril 2.5 mg daily


-no signs acs, trops negx3





htn:


-cont coreg, monitor





avr:


-stable, nl fcn on echo





cad, remote pci:


-stable, no signs acs


-cont statin, bb, asa





uti:


-on abx

## 2020-08-05 NOTE — PN
Teaching Attending Note


Name of Resident: Gilles Castillo





ATTENDING PHYSICIAN STATEMENT





I saw and evaluated the patient.


I reviewed the resident's note and discussed the case with the resident.


I agree with the resident's findings and plan as documented.








SUBJECTIVE:


Seen and examined at bedside.  Patient complains of some burning abdominal pain 

after eating.  Hemodynamically stable.  Volume status improved but still not 

euvolemic.Restarted on Coreg and lisinopril per cardiology.





OBJECTIVE:


                                Last Vital Signs











Temp Pulse Resp BP Pulse Ox


 


 98.7 F   76   20   133/77   97 


 


 08/05/20 09:00  08/05/20 09:00  08/05/20 09:00  08/05/20 09:00  08/05/20 09:00











PE: per resident note


Labs/Imaging: reviewed





ASSESSMENT AND PLAN:


79 year old male with history of HTN, CHF, Aortic Stenosis (s/p bioprosthetic 

valve replacement), CAD s/p PCI/Stent, Hx TIA, GERD, BPH,  Urinary retention 

(with indwelling swenson catheter), and Hypothyroidism presents with 2 day history

of intermittent chest pain, Le edema, orthopnea, SOB on exertion. Admits to non-

compliance with home meds including Lasix.








# Acute on Chronic Systolic CHF decompensation.


Pt remains hypervolemic: requires additional inpt diuresis


CXR - Cardiomegaly


BNP - 6407


Echo - global hypokinesis, EF 35%, Bioprosthetic AVR


Seen by Cardiology - to continue IV lasix diuresis, BB


For optimization of HF meds by Cardio.


LE edema, L>R - Duplex shows no evidence of DVT.





#Hx Iron Deficiency Anemia


Iron Sat 10/ Feritin 40


On Fe SO4 supplementation


Hx of Hematuria - No evidence of current acute blood loss - GI nor 


For further out-patient work-up with GI and Urology.





# Possible UTI (Catheter associated)


Urine Cx shows pseudomonas.  Swenson replaced and pending reculture


holding abx for now


ID on board: appreciate recs





# CAD - no signs of ACS


Continue Aspirin, BB, Statin.





# Lightheadedness - etiology unclear. Will check orthostatics and CT Head. No 

acute neurological deficits.


PT eval.





# HTN - Resume Coreg. Hold Norvasc. Confirm BP meds as home meds have Coreg and 

metoprolol listed.





# BPH with indwelling swenson - continue Flomax.


# HLD - Continiue Statin





# Hypothyroidism - continue Synthroid, TSH 5.05.





DVT Px - Lovenox SQ

## 2020-08-06 VITALS — TEMPERATURE: 97.8 F | SYSTOLIC BLOOD PRESSURE: 125 MMHG | HEART RATE: 83 BPM | DIASTOLIC BLOOD PRESSURE: 69 MMHG

## 2020-08-06 RX ADMIN — PANTOPRAZOLE SODIUM SCH MG: 20 TABLET, DELAYED RELEASE ORAL at 09:55

## 2020-08-06 RX ADMIN — TAMSULOSIN HYDROCHLORIDE SCH MG: 0.4 CAPSULE ORAL at 09:54

## 2020-08-06 RX ADMIN — FERROUS SULFATE TAB EC 324 MG (65 MG FE EQUIVALENT) SCH MG: 324 (65 FE) TABLET DELAYED RESPONSE at 09:54

## 2020-08-06 RX ADMIN — LISINOPRIL SCH MG: 5 TABLET ORAL at 09:55

## 2020-08-06 RX ADMIN — DOCUSATE SODIUM SCH: 100 CAPSULE, LIQUID FILLED ORAL at 09:54

## 2020-08-06 RX ADMIN — LEVOTHYROXINE SODIUM SCH MCG: 100 TABLET ORAL at 06:21

## 2020-08-06 RX ADMIN — ENOXAPARIN SODIUM SCH: 40 INJECTION SUBCUTANEOUS at 09:54

## 2020-08-06 RX ADMIN — FUROSEMIDE SCH MG: 10 INJECTION, SOLUTION INTRAVENOUS at 09:54

## 2020-08-06 RX ADMIN — CARVEDILOL SCH MG: 3.12 TABLET, FILM COATED ORAL at 09:54

## 2020-08-06 RX ADMIN — Medication SCH MG: at 09:55

## 2020-08-06 RX ADMIN — ASPIRIN 81 MG SCH MG: 81 TABLET ORAL at 09:54

## 2020-08-06 NOTE — PN
Progress Note (short form)





- Note


Progress Note: 





cc: le edema, abd pain





s: edema improved. no chest pain, palps, dizziness. walked in halls, felt well








                                        


                               Current Medications











Generic Name Dose Route Start Last Admin





  Trade Name Shannan  PRN Reason Stop Dose Admin


 


Ascorbic Acid  250 mg  08/04/20 12:00  08/06/20 09:55





  Vitamin C -  PO   250 mg





  DAILY [facility]   Administration


 


Aspirin  81 mg  08/04/20 10:00  08/06/20 09:54





  Asa -  PO   81 mg





  DAILY [facility]   Administration


 


Atorvastatin Calcium  20 mg  08/02/20 22:00  08/05/20 21:18





  Lipitor -  PO   Not Given





  [facility]  


 


Carvedilol  3.125 mg  08/03/20 22:00  08/06/20 09:54





  Coreg -  PO   3.125 mg





  BID [facility]   Administration


 


Docusate Sodium  100 mg  08/02/20 22:00  08/06/20 09:54





  Colace -  PO   Not Given





  BID [facility]  


 


Enoxaparin Sodium  40 mg  08/03/20 10:00  08/06/20 09:54





  Lovenox -  SQ   Not Given





  DAILY [facility]  


 


Ergocalciferol  50,000 unit  08/02/20 22:00  08/02/20 22:29





  Drisdol -  PO   Not Given





  Blanc@2200 [facility]  


 


Ferrous Sulfate  325 mg  08/02/20 22:00  08/06/20 09:54





  Feosol -  PO   325 mg





  BID [facility]   Administration


 


Furosemide  40 mg  08/02/20 21:45  08/06/20 09:54





  Lasix Injection -  IVPUSH   40 mg





  DAILY [facility]   Administration


 


Levothyroxine Sodium  100 mcg  08/03/20 07:00  08/06/20 06:21





  Synthroid -  PO   100 mcg





  DAILY@0700 [facility]   Administration


 


Lisinopril  2.5 mg  08/05/20 12:00  08/06/20 09:55





  Prinivil  PO   2.5 mg





  DAILY [facility]   Administration


 


Pantoprazole Sodium  20 mg  08/05/20 13:00  08/06/20 09:55





  Protonix -  PO   20 mg





  DAILY [facility]   Administration


 


Tamsulosin HCl  0.4 mg  08/03/20 10:00  08/06/20 09:54





  Flomax -  PO   0.4 mg





  DAILY [facility]   Administration








                                   Vital Signs











 Period  Temp  Pulse  Resp  BP Sys/Villa  Pulse Ox


 


 Last 24 Hr  97.4 F-98.4 F  62-81  18-20  112-138/63-88  











nad no jvd


rrr s1s2 no mrg


cta bl nl eff


aao3


trace le edema bl, no c/c


abd nt nd pos bs


no jaundice diaphoresis


+dp pt no carotid bruits


not agitated





                                    CBC, BMP





                                 08/05/20 05:25 





                                 08/05/20 05:25 








echo 8/2020:  lvef 35%, global hk, nl rv, lae, nl bio avr, mild mr, mild tr, 

rvsp 38





ecg: sr, rbbb





cxr: clear





tele: sr








a/p:  79 m hx htn, syst chf, as s/p remote bio avr, cad s/p remote pci, tia, 

here with le edema, abd pain.





acute syst chf:


-vol status improved after iv lasix, can change to po for tomorrow


-cont coreg, lisinopril for chf


-no signs acs, trops negx3





htn:


-cont current meds





avr:


-stable, nl fcn on echo





cad, remote pci:


-stable, no signs acs


-cont statin, bb, asa

## 2020-08-06 NOTE — DS
Physical Exam: 


SUBJECTIVE: No overnight events. Patient seen and examined. Endorses leg cramp 

improved. 





OBJECTIVE:





                                   Vital Signs











 Period  Temp  Pulse  Resp  BP Sys/Villa  Pulse Ox


 


 Last 24 Hr  97.7 F-98.4 F  62-83  18-20  117-137/63-81  








PHYSICAL EXAM





GENERAL: The patient is awake, alert, and oriented X 2, in no acute distress.


HEENT: Normal with no signs of trauma. No ptosis. Ears normal, nares patent, 

oropharynx clear without exudates, moist mucous membranes.


LUNGS: decreased Breath sounds at bases, clear to auscultation bilaterally, no 

wheezes, no crackles, no accessory muscle use. 


HEART: Regular rate and rhythm, S1, S2 without murmur, rub or gallop.systolic 

murmur 


ABDOMEN: Soft, nontender, nondistended, normoactive bowel sounds,


EXTREMITIES: 2+ edema b/l LE; improving. skin changes associated with venous 

stasis on L LE. L Leg non-TTP. 


NEUROLOGICAL: Normal speech, gait not observed.


PSYCH: normal affect.


SKIN: Warm, dry, normal turgor, no rashes or lesions noted


BACK: Stage 1 ulcer in sacral decubitus ulcer





LABS





HOSPITAL COURSE:


80 YO PMH BPH, urinary retention w/ indwelling swenson catheter, LE edema HTN, 

CHF, aortic stenosis (s/p bioprosthetic valve replacement), CAD s/p PCI/Stent, 

GERD, and TIA hx p/w 2 days of iintermittent CP. SOB, orthopnea, and LE edema 

worsened over the past 6 months 2/2 noncompliance with home meds. EKG showed NSR

w/ 1st degree AV block w/ premature atrial complexes. R bundle branch block. T

rops negative X3. Chest pain resolved. Patient was found to have acute on 

Chronic Systolic CHF. CXR showed Cardiomegaly. BNP was 6407. Echo showed global 

hypokinesis, EF 35%. Pt was given lisinopril, lasix, and coreg. 





UA showed 1+ protein, 2+ blood, 132 RBCs, 3+ leukoesterase, 1575 WBcs,  5261 

bacteria, epithelial cells 20, and urine culture showed pseudomonas > 100,00 

CFU. Managed with Ceftriaxone. Indwelling swenson catheter was replaced. Pt found 

to have protein calorie malnutrition. Was found to have Iron Deficiency Anemia. 

LOW Iron 30, Iron Sat 10// NORMAL ferritin 40.7, TIBC 274. Managed with iron 

supplements and vitamin C. 





Pt is stable for discharge. 





Date of Admission:08/02/20





Date of Discharge: 08/06/20











Minutes to complete discharge: 45





Discharge Summary


Problems reviewed: Yes


Reason For Visit: HEMATURIA CHEST PAIN


Condition: Stable





- Instructions


Diet, Activity, Other Instructions: 


You came into the hospital for chest pain and  worsening shortness of breath/leg

swelling. You were found to have an exacerbation of your congestive heart 

failure.  Echocardiogram showed reduced functioning of your heart. This 

condition is known as "CONGESTIVE HEART FAILURE (CHF)". You were given 

medication to promote urination of the excessive fluid in your body. You were 

also given medication to lower your blood pressure. Your symptoms improved.  EKG

showed a normal rhythm, and a "First-Degree AV Block". This AV block is a very 

common benign finding.  Imaging of your chest showed an enlarged heart.





Analysis of your urine showed bacteria within your bladder. An infectious 

disease specialist determined that this is likely due to colonization. This does

not require antibiotic treatment. Your swenson catheter was changed.  





Your bloodwork showed that you are anemic and iron deficient. Please continue 

taking your iron supplements (ferrous sulfate) and take vitamin C daily to help 

the iron absorption. You reported a history of chronic blood in urine. Please 

follow-up with a urologist regarding your bloody urine. 





Your symptoms improved. You are stable for discharge. 





MEDICATION 


Please STOP taking metoprolol succinate (Toprol). 


Please STOP taking amlodipine besylate. 





Please START taking furosemide (Lasix) 40 mg once a day. 


Please START taking Aspirin 81 mg once a day. 


Please START taking carvedilol (coreg) 3.125 mg twice a day. 


Please START taking lisinopril (Prinivil) 2.5 mg daily


Please START taking pantoprazole (protonix) 20 mg once a day. 


Please START taking vitamin C 250 mg once a day. 


Please START taking colace 100mg twice a day.





Please CONTINUE taking your home medication ferrous sulfate (iron supplements) 

325 mg twice day. 


Please CONTINUE taking your other home medications as prescribed. 





FOLLOW-UP


Please follow-up with primary care physician or a physician [facility] regarding your hospital visit and for general health maintenance.




Please follow-up with cardiologist, Dr. Simms, regarding your heart failure re

[facility]. 


Please follow-up with urologist, Dr. Briceno, regarding your urinary tract 

infection, swenson catheter, and history of blood in the urine.  





Please continue with a low sodium/fat/cholesterol diet. 





ADDITIONAL INFORMATION


Please call 911 or come directly to the emergency department if you experience 

chest pain, shortness of breath,  unusual bleeding, unusual headache, vision 

change, difficulty speaking, numbness, tingling, loss of alertness/awareness, 

loss of function, or any alarming symptoms. 


Referrals: 


[facility] [place] [Provider Group]


Faizan Simms MD [Staff Physician] - 


Alex Briceno MD [Staff Physician] - 


Disposition: SKILLED NURSING FACILITY





- Home Medications


Comprehensive Discharge Medication List: 


Ambulatory Orders





Atorvastatin Ca [Lipitor] 20 mg PO HS 08/02/20 


Docusate Sodium [Colace -] 100 mg PO BID 08/02/20 


Ergocalciferol (Vitamin D2) [Vitamin D2] 50,000 unit PO WEEKLY 08/02/20 


Ferrous Sulfate 325 mg PO BID 08/02/20 


Levothyroxine [Synthroid -] 100 mcg PO DAILY 08/02/20 


Tamsulosin HCl 0.4 mg PO DAILY 08/02/20 


Ascorbic Acid [Vitamin C -] 250 mg PO DAILY  tablet 08/06/20 


Aspirin [ASA -] 81 mg PO DAILY  tab.chew 08/06/20 


Carvedilol [Coreg -] 3.125 mg PO BID  tablet 08/06/20 


Docusate Sodium [Colace -] 100 mg PO BID  capsule 08/06/20 


Furosemide [Lasix -] 40 mg PO DAILY  tablet 08/06/20 


Lisinopril [Prinivil] 2.5 mg PO DAILY  tablet 08/06/20 


Pantoprazole Sodium [Protonix -] 20 mg PO DAILY  tablet.ec 08/06/20 








This patient is new to me today: No


Emergency Visit: Yes


ED Registration Date: 08/02/20


Care time: The patient presented to the Emergency Department on the above date 

and was hospitalized for further evaluation of their emergent condition.


Critical Care patient: No





- Discharge Referral


Referred to [facility] No





ATTENDING PHYSICIAN STATEMENT





I saw and evaluated the patient.


I reviewed the resident's note and discussed the case with the resident.


I agree with the resident's findings and plan as documented.








SUBJECTIVE:








OBJECTIVE:








ASSESSMENT AND PLAN:

## 2020-08-06 NOTE — PN
Teaching Attending Note


Name of Resident: Gilles Castillo





ATTENDING PHYSICIAN STATEMENT





I saw and evaluated the patient.


I reviewed the resident's note and discussed the case with the resident.


I agree with the resident's findings and plan as documented.








SUBJECTIVE:


Seen and examined at bedside.  Patient's edema is significantly improved.  Mild 

2+ edema to mid calf and left lower extremity.  Patient received 40 units IV 

Lasix today.  Can be discharged on his home medications.  Repeat urine culture 

again positive for non-lactose fermenting gram-negative bacilli, assumed to be 

repeat positive Pseudomonas culture.  Discussed with ID: This likely represents 

colonization, not infection and we will not treat with antibiotics at this 

time.Patient is medically cleared for discharge.  





OBJECTIVE:


                                Last Vital Signs











Temp Pulse Resp BP Pulse Ox


 


 97.8 F   83   18   125/69   100 


 


 08/06/20 13:50  08/06/20 13:50  08/06/20 09:35  08/06/20 13:50  08/06/20 13:50











PE: per resident note


Labs/Imaging: reviewed





ASSESSMENT AND PLAN:


79 year old male with history of HTN, CHF, Aortic Stenosis (s/p bioprosthetic 

valve replacement), CAD s/p PCI/Stent, Hx TIA, GERD, BPH,  Urinary retention 

(with indwelling swenson catheter), and Hypothyroidism presents with 2 day history

of intermittent chest pain, Le edema, orthopnea, SOB on exertion. Admits to non-

compliance with home meds including Lasix.  Patient admitted for CHF 

exacerbation.  Echocardiogram showed global hypokinesis and EF of 35%.  Patient 

was diuresed with good effect.  Can be discharged on his home medications.  

Repeat urine culture again positive for non-lactose fermenting gram-negative 

bacilli, assumed to be repeat positive Pseudomonas culture.  Discussed with ID: 

This likely represents colonization, not infection and we will not treat with 

antibiotics at this time.Patient is medically cleared for discharge.

## 2020-08-06 NOTE — PN
Progress Note, Physician


History of Present Illness: 





AWAKE, ALERT IN BED


NO COMPLAINTS


NO C/O SUPRAPUBIC OR FLANK PAIN


NO F/C


URINE IN BOLAÑOS COLLECTION BAG CLEAR


REPEAT URINE C/S MIXED ORGANISMS





- Current Medication List


Current Medications: 


Active Medications





Ascorbic Acid (Vitamin C -)  250 mg PO DAILY [facility]


   Last Admin: 08/06/20 09:55 Dose:  250 mg


   Documented by: 


Aspirin (Asa -)  81 mg PO DAILY [facility]


   Last Admin: 08/06/20 09:54 Dose:  81 mg


   Documented by: 


Atorvastatin Calcium (Lipitor -)  20 mg PO HS [facility]


   Last Admin: 08/05/20 21:18 Dose:  Not Given


   Documented by: 


Carvedilol (Coreg -)  3.125 mg PO BID [facility]


   Last Admin: 08/06/20 09:54 Dose:  3.125 mg


   Documented by: 


Docusate Sodium (Colace -)  100 mg PO BID [facility]


   Last Admin: 08/06/20 09:54 Dose:  Not Given


   Documented by: 


Enoxaparin Sodium (Lovenox -)  40 mg SQ DAILY [facility]


   Last Admin: 08/06/20 09:54 Dose:  Not Given


   Documented by: 


Ergocalciferol (Drisdol -)  50,000 unit PO Blanc@2200 [facility]


   Last Admin: 08/02/20 22:29 Dose:  Not Given


   Documented by: 


Ferrous Sulfate (Feosol -)  325 mg PO BID [facility]


   Last Admin: 08/06/20 09:54 Dose:  325 mg


   Documented by: 


Furosemide (Lasix -)  40 mg PO DAILY [facility]


Levothyroxine Sodium (Synthroid -)  100 mcg PO DAILY@0700 [facility]


   Last Admin: 08/06/20 06:21 Dose:  100 mcg


   Documented by: 


Lisinopril (Prinivil)  2.5 mg PO DAILY [facility]


   Last Admin: 08/06/20 09:55 Dose:  2.5 mg


   Documented by: 


Pantoprazole Sodium (Protonix -)  20 mg PO DAILY [facility]


   Last Admin: 08/06/20 09:55 Dose:  20 mg


   Documented by: 


Tamsulosin HCl (Flomax -)  0.4 mg PO DAILY [facility]


   Last Admin: 08/06/20 09:54 Dose:  0.4 mg


   Documented by: 











- Objective


Vital Signs: 


                                   Vital Signs











Temperature  97.8 F   08/06/20 13:50


 


Pulse Rate  83   08/06/20 13:50


 


Respiratory Rate  18   08/06/20 09:35


 


Blood Pressure  125/69   08/06/20 13:50


 


O2 Sat by Pulse Oximetry (%)  100   08/06/20 13:50











Constitutional: Yes: No Distress


Eyes: Yes: Conjunctiva Clear


Cardiovascular: Yes: Regular Rate and Rhythm, S1, S2


Respiratory: Yes: CTA Bilaterally


Gastrointestinal: Yes: Normal Bowel Sounds, Soft, Tenderness


Labs: 


                                    CBC, BMP





                                 08/05/20 05:25 





                                 08/05/20 05:25 





                                    INR, PTT











INR  1.18  (0.83-1.09)  H  08/02/20  16:15    














Assessment/Plan





PROBABLE URINARY TRACT COLONIZATION





NON-TOXIC APPEARING


NO EVIDENCE OF SYSTEMIC INFECTION


AFEBRILE     WBC WNL


OBSERVE OFF ANTIBIOTICS

## 2020-09-07 ENCOUNTER — HOSPITAL ENCOUNTER (INPATIENT)
Dept: HOSPITAL 74 - JER | Age: 80
LOS: 18 days | Discharge: HOSPICE | DRG: 871 | End: 2020-09-25
Attending: FAMILY MEDICINE | Admitting: HOSPITALIST
Payer: COMMERCIAL

## 2020-09-07 VITALS — BODY MASS INDEX: 20 KG/M2

## 2020-09-07 DIAGNOSIS — R33.9: ICD-10-CM

## 2020-09-07 DIAGNOSIS — Z95.5: ICD-10-CM

## 2020-09-07 DIAGNOSIS — N17.9: ICD-10-CM

## 2020-09-07 DIAGNOSIS — I50.22: ICD-10-CM

## 2020-09-07 DIAGNOSIS — N39.0: ICD-10-CM

## 2020-09-07 DIAGNOSIS — E87.0: ICD-10-CM

## 2020-09-07 DIAGNOSIS — I95.9: ICD-10-CM

## 2020-09-07 DIAGNOSIS — K21.9: ICD-10-CM

## 2020-09-07 DIAGNOSIS — I11.0: ICD-10-CM

## 2020-09-07 DIAGNOSIS — I21.4: ICD-10-CM

## 2020-09-07 DIAGNOSIS — A41.59: Primary | ICD-10-CM

## 2020-09-07 DIAGNOSIS — R64: ICD-10-CM

## 2020-09-07 DIAGNOSIS — I25.10: ICD-10-CM

## 2020-09-07 DIAGNOSIS — Z86.73: ICD-10-CM

## 2020-09-07 DIAGNOSIS — R79.89: ICD-10-CM

## 2020-09-07 DIAGNOSIS — A41.89: ICD-10-CM

## 2020-09-07 DIAGNOSIS — E87.5: ICD-10-CM

## 2020-09-07 DIAGNOSIS — E43: ICD-10-CM

## 2020-09-07 DIAGNOSIS — R10.9: ICD-10-CM

## 2020-09-07 DIAGNOSIS — D72.829: ICD-10-CM

## 2020-09-07 DIAGNOSIS — R65.21: ICD-10-CM

## 2020-09-07 DIAGNOSIS — Z95.2: ICD-10-CM

## 2020-09-07 DIAGNOSIS — R62.7: ICD-10-CM

## 2020-09-07 DIAGNOSIS — R45.1: ICD-10-CM

## 2020-09-07 DIAGNOSIS — I35.0: ICD-10-CM

## 2020-09-07 DIAGNOSIS — R07.89: ICD-10-CM

## 2020-09-07 DIAGNOSIS — R13.10: ICD-10-CM

## 2020-09-07 DIAGNOSIS — E03.9: ICD-10-CM

## 2020-09-07 DIAGNOSIS — N40.0: ICD-10-CM

## 2020-09-07 DIAGNOSIS — K59.00: ICD-10-CM

## 2020-09-07 LAB
ALBUMIN SERPL-MCNC: 2.1 G/DL (ref 3.4–5)
ALP SERPL-CCNC: 217 U/L (ref 45–117)
ALT SERPL-CCNC: 21 U/L (ref 13–61)
ANION GAP SERPL CALC-SCNC: 16 MMOL/L (ref 8–16)
APTT BLD: 29.3 SECONDS (ref 25.2–36.5)
AST SERPL-CCNC: 78 U/L (ref 15–37)
BASOPHILS # BLD: 0.1 % (ref 0–2)
BILIRUB SERPL-MCNC: 0.7 MG/DL (ref 0.2–1)
BNP SERPL-MCNC: (no result) PG/ML (ref 5–450)
CALCIUM SERPL-MCNC: 8.4 MG/DL (ref 8.5–10.1)
CHLORIDE SERPL-SCNC: 96 MMOL/L (ref 98–107)
CO2 SERPL-SCNC: 19 MMOL/L (ref 21–32)
DEPRECATED RDW RBC AUTO: 17.8 % (ref 11.9–15.9)
EOSINOPHIL # BLD: 0 % (ref 0–4.5)
GLUCOSE SERPL-MCNC: 106 MG/DL (ref 74–106)
HCT VFR BLD CALC: 36.3 % (ref 35.4–49)
HGB BLD-MCNC: 11.9 GM/DL (ref 11.7–16.9)
INR BLD: 0.95 (ref 0.83–1.09)
LYMPHOCYTES # BLD: 1.3 % (ref 8–40)
MCH RBC QN AUTO: 25.6 PG (ref 25.7–33.7)
MCHC RBC AUTO-ENTMCNC: 32.7 G/DL (ref 32–35.9)
MCV RBC: 78.2 FL (ref 80–96)
MONOCYTES # BLD AUTO: 1.6 % (ref 3.8–10.2)
NEUTROPHILS # BLD: 97 % (ref 42.8–82.8)
PLATELET # BLD AUTO: 116 K/MM3 (ref 134–434)
PMV BLD: 9.7 FL (ref 7.5–11.1)
POTASSIUM SERPLBLD-SCNC: 5.9 MMOL/L (ref 3.5–5.1)
PROT SERPL-MCNC: 6.6 G/DL (ref 6.4–8.2)
PT PNL PPP: 11.2 SEC (ref 9.7–13)
RBC # BLD AUTO: 4.64 M/MM3 (ref 4–5.6)
SODIUM SERPL-SCNC: 131 MMOL/L (ref 136–145)
WBC # BLD AUTO: 34.9 K/MM3 (ref 4–10)

## 2020-09-07 PROCEDURE — U0003 INFECTIOUS AGENT DETECTION BY NUCLEIC ACID (DNA OR RNA); SEVERE ACUTE RESPIRATORY SYNDROME CORONAVIRUS 2 (SARS-COV-2) (CORONAVIRUS DISEASE [COVID-19]), AMPLIFIED PROBE TECHNIQUE, MAKING USE OF HIGH THROUGHPUT TECHNOLOGIES AS DESCRIBED BY CMS-2020-01-R: HCPCS

## 2020-09-07 PROCEDURE — G0480 DRUG TEST DEF 1-7 CLASSES: HCPCS

## 2020-09-08 LAB
ALBUMIN SERPL-MCNC: 1.8 G/DL (ref 3.4–5)
ALP SERPL-CCNC: 188 U/L (ref 45–117)
ALT SERPL-CCNC: 21 U/L (ref 13–61)
ANION GAP SERPL CALC-SCNC: 16 MMOL/L (ref 8–16)
ANISOCYTOSIS BLD QL: (no result)
ANISOCYTOSIS BLD QL: (no result)
APPEARANCE UR: (no result)
APPEARANCE UR: CLEAR
AST SERPL-CCNC: 76 U/L (ref 15–37)
BACTERIA # UR AUTO: 7864 /UL (ref 0–1359)
BACTERIA # UR AUTO: 8997 /UL (ref 0–1359)
BASOPHILS # BLD: 0.3 % (ref 0–2)
BILIRUB SERPL-MCNC: 0.6 MG/DL (ref 0.2–1)
BILIRUB UR STRIP.AUTO-MCNC: NEGATIVE MG/DL
BILIRUB UR STRIP.AUTO-MCNC: NEGATIVE MG/DL
BUN SERPL-MCNC: 176.2 MG/DL (ref 7–18)
BUN SERPL-MCNC: 192.9 MG/DL (ref 7–18)
CALCIUM SERPL-MCNC: 8.1 MG/DL (ref 8.5–10.1)
CASTS URNS QL MICRO: 247 /UL (ref 0–3.1)
CASTS URNS QL MICRO: 5 /UL (ref 0–3.1)
CHLORIDE SERPL-SCNC: 103 MMOL/L (ref 98–107)
CO2 SERPL-SCNC: 18 MMOL/L (ref 21–32)
COLOR UR: YELLOW
COLOR UR: YELLOW
CREAT SERPL-MCNC: 6.6 MG/DL (ref 0.55–1.3)
CREAT SERPL-MCNC: 7.8 MG/DL (ref 0.55–1.3)
DEPRECATED RDW RBC AUTO: 17.6 % (ref 11.9–15.9)
EOSINOPHIL # BLD: 0.1 % (ref 0–4.5)
EPITH CASTS URNS QL MICRO: >36 /UL (ref 0–25.1)
EPITH CASTS URNS QL MICRO: >36 /UL (ref 0–25.1)
GLUCOSE SERPL-MCNC: 86 MG/DL (ref 74–106)
HCT VFR BLD CALC: 35.1 % (ref 35.4–49)
HGB BLD-MCNC: 11 GM/DL (ref 11.7–16.9)
KETONES UR QL STRIP: NEGATIVE
KETONES UR QL STRIP: NEGATIVE
LEUKOCYTE ESTERASE UR QL STRIP.AUTO: (no result)
LEUKOCYTE ESTERASE UR QL STRIP.AUTO: (no result)
LYMPHOCYTES # BLD: 1.6 % (ref 8–40)
MACROCYTES BLD QL: (no result)
MACROCYTES BLD QL: 0
MCH RBC QN AUTO: 24.6 PG (ref 25.7–33.7)
MCHC RBC AUTO-ENTMCNC: 31.3 G/DL (ref 32–35.9)
MCV RBC: 78.6 FL (ref 80–96)
MONOCYTES # BLD AUTO: 3.3 % (ref 3.8–10.2)
NEUTROPHILS # BLD: 94.7 % (ref 42.8–82.8)
NITRITE UR QL STRIP: NEGATIVE
NITRITE UR QL STRIP: NEGATIVE
PH UR: 5 [PH] (ref 5–8)
PH UR: 6 [PH] (ref 5–8)
PLATELET # BLD AUTO: 103 K/MM3 (ref 134–434)
PLATELET BLD QL SMEAR: (no result)
PMV BLD: 9.2 FL (ref 7.5–11.1)
POTASSIUM SERPLBLD-SCNC: 4.7 MMOL/L (ref 3.5–5.1)
PROT SERPL-MCNC: 5.6 G/DL (ref 6.4–8.2)
PROT UR QL STRIP: (no result)
PROT UR QL STRIP: (no result)
PROT UR QL STRIP: NEGATIVE
PROT UR QL STRIP: NEGATIVE
RBC # BLD AUTO: 20 /UL (ref 0–23.9)
RBC # BLD AUTO: 4.46 M/MM3 (ref 4–5.6)
SODIUM SERPL-SCNC: 137 MMOL/L (ref 136–145)
SP GR UR: 1.01 (ref 1.01–1.03)
SP GR UR: 1.01 (ref 1.01–1.03)
UROBILINOGEN UR STRIP-MCNC: 0.2 MG/DL (ref 0.2–1)
UROBILINOGEN UR STRIP-MCNC: 1 MG/DL (ref 0.2–1)
WBC # BLD AUTO: 31.5 K/MM3 (ref 4–10)
WBC # UR AUTO: (no result) /UL (ref 0–25.8)
WBC # UR AUTO: 20 /UL (ref 0–25.8)

## 2020-09-08 RX ADMIN — TAMSULOSIN HYDROCHLORIDE SCH: 0.4 CAPSULE ORAL at 08:54

## 2020-09-08 RX ADMIN — HEPARIN SODIUM SCH UNIT: 5000 INJECTION, SOLUTION INTRAVENOUS; SUBCUTANEOUS at 22:37

## 2020-09-08 RX ADMIN — DOCUSATE SODIUM SCH: 100 CAPSULE, LIQUID FILLED ORAL at 09:45

## 2020-09-08 RX ADMIN — PIPERACILLIN SODIUM AND TAZOBACTAM SODIUM SCH MLS/HR: .25; 2 INJECTION, POWDER, LYOPHILIZED, FOR SOLUTION INTRAVENOUS at 18:51

## 2020-09-08 RX ADMIN — HEPARIN SODIUM SCH UNIT: 5000 INJECTION, SOLUTION INTRAVENOUS; SUBCUTANEOUS at 10:00

## 2020-09-08 RX ADMIN — ATORVASTATIN CALCIUM SCH MG: 20 TABLET, FILM COATED ORAL at 22:37

## 2020-09-08 RX ADMIN — FERROUS SULFATE TAB EC 324 MG (65 MG FE EQUIVALENT) SCH: 324 (65 FE) TABLET DELAYED RESPONSE at 09:45

## 2020-09-08 RX ADMIN — PANTOPRAZOLE SODIUM SCH MG: 20 TABLET, DELAYED RELEASE ORAL at 06:08

## 2020-09-08 RX ADMIN — SODIUM BICARBONATE SCH MLS/HR: 84 INJECTION, SOLUTION INTRAVENOUS at 16:32

## 2020-09-08 RX ADMIN — DOCUSATE SODIUM SCH: 100 CAPSULE, LIQUID FILLED ORAL at 22:09

## 2020-09-08 RX ADMIN — Medication SCH: at 09:45

## 2020-09-08 RX ADMIN — LEVOTHYROXINE SODIUM SCH MCG: 100 TABLET ORAL at 06:08

## 2020-09-08 RX ADMIN — PIPERACILLIN SODIUM AND TAZOBACTAM SODIUM SCH: .25; 2 INJECTION, POWDER, LYOPHILIZED, FOR SOLUTION INTRAVENOUS at 16:00

## 2020-09-08 RX ADMIN — ASPIRIN 81 MG SCH: 81 TABLET ORAL at 09:44

## 2020-09-08 NOTE — CONS
DATE OF CONSULTATION:  

 

DATE OF DICTATION:  09/08/2020

 

INFECTIOUS DISEASE CONSULTATION 

 

HISTORY OF PRESENT ILLNESS:  This is an elderly man, he is 79.  He was recently 
in

the hospital in August at which time he presented with chest pain, shortness of

breath, orthopnea, and lower extremity edema.  His chest pain resolved.  He had 
some

mild heart failure, for which he was treated.  He was admitted on the 2nd and

discharged on the 6th.  He now presents on the 7th of September with hypotension
and

lethargy and suprapubic discomfort.  In the ER he was noted to have a white 
count of

34,000.  He received vancomycin and Zosyn.  Urinalysis was noted to have blood 
and

leukocyte esterase positive.  He was noted to be in acute renal failure with a

creatinine of 7.8.  He was admitted.

 

PAST MEDICAL HISTORY:  Notable for a history of hypertension, CHF, aortic 
stenosis,

coronary artery disease with stents, GERD, TIA, BPH.  He has had a bioprosthetic

aortic valve.  He has a chronic indwelling Rios catheter, history of lower 
extremity

edema, and CHF in the past.  He has had a TIA in the past.  He had no known drug

allergies.  Current he is unable to give any history.  His medications at the 
nursing

home include tamsulosin, Protonix, melatonin, levothyroxine, Lasix, ferrous 
sulfate,

vitamin D, Colace, Coreg, Lipitor, aspirin, vitamin C, Proventil.  

 

SOCIAL HISTORY:  No history of cigarette use.  Rest is unknown.  He resides at 
the 

nursing home.  

 

Per the history and physical he is DNR, DNI, but family is okay with IV 
antibiotics. 

There is no history of any recent travel.  He has no allergies.

 

REVIEW OF SYSTEMS:  Was not obtainable.  

 

PHYSICAL EXAMINATION:

General:  He has no fever.

Vital Signs:  Temperature is 97.7, pulse is 67, blood pressure is 100/62, 
respiratory

rate is 16, he is saturating 100% on 2 L.  

HEENT:  Normocephalic.  Eyes are anicteric.  He had dry oral mucosa.  

Lungs:  Diminished breath sounds at the bases.  

Heart:  Regular rate and rhythm.  

Abdomen:  Soft, nontender.  He has a Rios draining.  

Extremities:  Without edema.

Skin:  He has a stage 2 decubitus ulcer that has no edema or drainage.  

 

LABORATORY:  Notable for an admitting white count of 34.9 today is 31.5, 
hemoglobin

of 11, platelets of 103.  BUN and creatinine are 192 and 7.8 on admission, this

morning 176 and 6.6.  Albumin is 1.8, alkaline phosphatase of 188 with an AST of
76. 

Urinalysis has 3+ leukocytes with 27,000 white cells.  COVID PCR serology is 
pending.

 Blood cultures are growing gram-positive cocci in chains.  Prior urine from 
August

shows pseudomonas and group B enterococcus.  

 

IMPRESSION:  In summary, this is an elderly man with sepsis secondary to gram-
positive bacteremia

possibly Streptococcus, urinary tract infection, acute renal failure, aortic 
stenosis

with bioprosthetic aortic valve, do not resuscitate/do not intubate.  Would 
continue

vancomycin by levels and ceftriaxone.  Follow up cultures.  Will need 
echocardiogram given the 

bioprosthetic valve, and follow up a renal sonogram.  Further recommendations to

follow.  

 

 

CLIFF [id number]

DD: 09/08/2020 18:23

DT: 09/08/2020 19:19

Job #:  [id number]

[email or web address]

## 2020-09-08 NOTE — PN
Progress Note, Physician


Chief Complaint: 





Sepsis


JO ANN





History of Present Illness: 





Mr. Lovelace is an 79 year old male BIBA from [facility] for 

hypotension (SBP in the 70's). He has a past medical history of hypertension, 

CHF, aortic stenosis s/p bioprosthetic valve replacement, CAD, prior stent, 

GERD,TIA,BPH and indwelling swenson. 





NAD


c/o fatigue


lethargic


Denies any pain or SOB





- Current Medication List


Current Medications: 


Active Medications





Albuterol Sulfate (Ventolin Hfa Inhaler -)  2 puff IH TID PRN


   PRN Reason: ASTHMA


Ascorbic Acid (Vitamin C -)  250 mg PO DAILY [facility]


Aspirin (Asa -)  81 mg PO DAILY [facility]


Atorvastatin Calcium (Lipitor -)  20 mg PO HS [facility]


Docusate Sodium (Colace -)  100 mg PO BID [facility]


Ergocalciferol (Drisdol -)  50,000 unit PO WEEKLY [facility]


Ferrous Sulfate (Feosol -)  325 mg PO DAILY [facility]


Heparin Sodium (Porcine) (Heparin -)  5,000 unit SQ BID [facility]


Piperacillin Sod/Tazobactam (Sod 2.25 gm/ Dextrose)  50 mls @ 100 mls/hr IVPB 

Q8H-IV [facility]; Protocol


Sodium Chloride (Normal Saline -)  1,000 mls @ 75 mls/hr IV ASDIR [facility]


   Last Admin: 09/08/20 02:16 Dose:  75 mls/hr


   Documented by: 


Piperacillin Sod/Tazobactam (Sod 2.25 gm/ Dextrose)  50 mls @ 100 mls/hr IVPB 

Q8H-IV [facility]; Protocol


   Stop: 09/09/20 02:29


Levothyroxine Sodium (Synthroid -)  100 mcg PO ACBK [facility]


   Last Admin: 09/08/20 06:08 Dose:  100 mcg


   Documented by: 


Pantoprazole Sodium (Protonix -)  20 mg PO ACBK [facility]


   Last Admin: 09/08/20 06:08 Dose:  20 mg


   Documented by: 


Tamsulosin HCl (Flomax -)  0.4 mg PO DAILY@0830 [facility]


   Last Admin: 09/08/20 08:54 Dose:  Not Given


   Documented by: 











- Objective


Vital Signs: 


                                   Vital Signs











Temperature  97.3 F L  09/07/20 21:55


 


Pulse Rate  65   09/08/20 08:40


 


Respiratory Rate  20   09/08/20 07:20


 


Blood Pressure  102/63   09/08/20 07:20


 


O2 Sat by Pulse Oximetry (%)  100   09/08/20 08:40











Constitutional: Yes: No Distress, Calm, Cachectic


Cardiovascular: Yes: Regular Rate and Rhythm


Respiratory: Yes: Regular, CTA Bilaterally


Gastrointestinal: Yes: Normal Bowel Sounds, Soft, Tenderness (LLQ)


Genitourinary: Yes: Swenson Present


Musculoskeletal: Yes: Muscle Weakness


Extremities: Yes: Other (generalized atrophy)


Edema: No


Peripheral Pulses WNL: Yes


Neurological: Yes: Alert, Lethargy


Labs: 


                                    CBC, BMP





                                 09/07/20 22:50 





                                 09/07/20 22:50 





                                    INR, PTT











INR  0.95  (0.83-1.09)   09/07/20  22:50    














Problem List





- Problems


(1) Chronic retention of urine


Assessment/Plan: 


-Continue swenson cath


Problems reviewed: Yes   


Code(s): R33.9 - RETENTION OF URINE, UNSPECIFIED   





(2) JO ANN (acute kidney injury)


Assessment/Plan: 


-IVF


-Nephrology consult


-Renal U/S


-UC pending


-Cr trending down


-Continue IVF


Problems reviewed: Yes   


Code(s): N17.9 - ACUTE KIDNEY FAILURE, UNSPECIFIED   





(3) Hyperkalemia


Assessment/Plan: 


-resolved


Problems reviewed: Yes   


Code(s): E87.5 - HYPERKALEMIA   





(4) Sepsis


Assessment/Plan: 


-ID consult


-IV Zosyn


-No Lactic acidosis


-Afebrile


-Cultures:


                                  Microbiology





09/07/20 22:50   Blood - Peripheral Venous   Blood Culture - Preliminary


                            Pending Organism


                            Pending Organism#2


09/07/20 22:50   Blood - Peripheral Venous   Blood Culture - Preliminary


                            Pending Organism


                            Pending Organism#2











Problems reviewed: Yes   


Code(s): A41.9 - SEPSIS, UNSPECIFIED ORGANISM   





Assessment/Plan





See problem list

## 2020-09-08 NOTE — PN
Progress Note (short form)





- Note


Progress Note: 





ID consult dictated





imp/reccd





gram positive bacteremia- ?strep


UTI


ARF


AS with bioprosthetic aortic valve


DNR/DNI





vanco by levels


ceftriaxone





f/u cultures





will need echo with history of bioprosthetic valve





f/u renal sonogram

## 2020-09-08 NOTE — HP
CHIEF COMPLAINT:here from [facility] with low blood pressure 





PCP: Dr. Multani





HISTORY OF PRESENT ILLNESS:


Mr. Lovelace is an 79 year old male BIBA from [facility] for 

hypotension (SBP in the 70's). He has a past medical history of hypertension, 

CHF, aortic stenosis s/p bioprosthetic valve replacement, CAD, prior stent, 

GERD,TIA,BPH and indwelling swenson. 





ER course notable for:


(1)leukocytosis (WBC 34.9), lactic acid level normal, received IV Vancomycin and

Pippercillin.


(2).9 creatinine 7.8, received 2 liters of IVF. 


(3)potassium 5.9


(4) sodium 131 


(5) BNP >84532


(60 UA with 3+ leukoesterase and 2+ blood 





Resuscitation Status: DNR/DNI status





As per health care proxy ,does not want aggressive medical treatment and was ok 

with giving IV antibiotics. 





Recent Travel:


no 





PAST MEDICAL HISTORY:


hypertension


CHF 


aortic stenosis


CAD with PCI


GERD 


TIA


BPH 





PAST SURGICAL HISTORY:


aortic valve replacement 





Social History:


Smoking:unable to obtain


Alcohol:unable to obtain 


Drugs:unable to obtain 





Allergies


No Known Allergies Allergy (Verified 09/07/20 22:00)


   








HOME MEDICATIONS:


                                Home Medications











 Medication  Instructions  Recorded


 


Atorvastatin Ca [Lipitor] 20 mg PO HS 08/02/20


 


Ergocalciferol (Vitamin D2) 50,000 unit PO WEEKLY 08/02/20





[Vitamin D2]  


 


Ferrous Sulfate 325 mg PO DAILY 08/02/20


 


Levothyroxine [Synthroid -] 100 mcg PO DAILY 08/02/20


 


Tamsulosin HCl 0.4 mg PO DAILY 08/02/20


 


Ascorbic Acid [Vitamin C -] 250 mg PO DAILY  tablet 08/06/20


 


Aspirin [ASA -] 81 mg PO DAILY  tab.chew 08/06/20


 


Carvedilol [Coreg -] 3.125 mg PO BID  tablet 08/06/20


 


Docusate Sodium [Colace -] 100 mg PO BID  capsule 08/06/20


 


Furosemide [Lasix -] 40 mg PO DAILY  tablet 08/06/20


 


Pantoprazole Sodium [Protonix -] 20 mg PO DAILY  tablet.ec 08/06/20


 


Acetaminophen 650 mg PO QID PRN 09/07/20


 


Albuterol Sulfate [Proventil Hfa] 6.7 gm IH TID PRN 09/07/20


 


Mag Hydrox/Aluminum Hyd/Simeth 30 ml PO BID PRN 09/07/20





[Maalox Advanced Suspension]  


 


Melatonin 3 mg PO HS 09/07/20








REVIEW OF SYSTEMS


unable to obtain accurate ROS due to patient mental status


CONSTITUTIONAL: 


Absent:  fever, chills, diaphoresis, generalized weakness, malaise, loss of 

appetite, weight change


HEENT: 


Absent:  rhinorrhea, nasal congestion, throat pain, throat swelling, difficulty 

swallowing, mouth swelling, ear pain, eye pain, visual changes


CARDIOVASCULAR: 


Absent: chest pain, syncope, palpitations, irregular heart rate, lighthead

edness, peripheral edema


RESPIRATORY: 


Absent: cough, shortness of breath, dyspnea with exertion, orthopnea, wheezing, 

stridor, hemoptysis


GASTROINTESTINAL:


Absent: abdominal pain, abdominal distension, nausea, vomiting, diarrhea, 

constipation, melena, hematochezia


GENITOURINARY: 


Absent: dysuria, frequency, urgency, hesitancy, hematuria, flank pain, genital 

pain


MUSCULOSKELETAL: 


Absent: myalgia, arthralgia, joint swelling, back pain, neck pain


SKIN: 


Absent: rash, itching, pallor


HEMATOLOGIC/IMMUNOLOGIC: 


Absent: easy bleeding, easy bruising, lymphadenopathy, frequent infections


ENDOCRINE:


Absent: unexplained weight gain, unexplained weight loss, heat intolerance, cold

intolerance


NEUROLOGIC: 


Absent: headache, focal weakness or paresthesias, dizziness, unsteady gait, 

seizure, mental status changes, bladder or bowel incontinence


PSYCHIATRIC: 


Absent: anxiety, depression, suicidal or homicidal ideation, hallucinations.








PHYSICAL EXAMINATION


                               Vital Signs - 24 hr











  09/07/20 09/07/20 09/08/20





  21:55 23:15 00:15


 


Temperature 97.3 F L  


 


Pulse Rate 72  


 


Pulse Rate [  66 72





Apical]   


 


Respiratory 17 15 18





Rate   


 


Blood Pressure 71/52 L  


 


Blood Pressure  81/60 L 109/66





[Right Arm]   


 


O2 Sat by Pulse 100 100 100





Oximetry (%)   











General no acute distress, poor dentition


Vital signs reviewed afebrile  bp improved


Neck no JVD 


Neuro awake, appears disoriented  speech clear  answers simple questions 

appropriatelt 


Lungs CTA nonlabored breathing effort


Heart s1s2 rate regular + grade 3/6 VERONA 


Abdomen soft nontender nondistended


Extremities warm to touch no pitting edema no cyanosis 





Mood appears 





                         Laboratory Results - last 24 hr











  09/07/20 09/07/20 09/07/20





  22:50 22:50 22:50


 


WBC   34.9 H* 


 


RBC   4.64 


 


Hgb   11.9 


 


Hct   36.3 


 


MCV   78.2 L 


 


MCH   25.6 L 


 


MCHC   32.7 


 


RDW   17.8 H 


 


Plt Count   116 L D 


 


MPV   9.7 


 


Absolute Neuts (auto)   33.8 H 


 


Neutrophils %   97.0 H D 


 


Lymphocytes %   1.3 L D 


 


Monocytes %   1.6 L D 


 


Eosinophils %   0.0  D 


 


Basophils %   0.1 


 


Nucleated RBC %   0 


 


PT with INR  11.20  


 


INR  0.95  


 


PTT (Actin FS)  29.3  


 


Sodium    131 L


 


Potassium    5.9 H


 


Chloride    96 L


 


Carbon Dioxide    19 L


 


Anion Gap    16


 


BUN    192.9 H*


 


Creatinine    7.8 H*


 


Est GFR (CKD-EPI)AfAm    6.84


 


Est GFR (CKD-EPI)NonAf    5.91


 


Random Glucose    106


 


Lactic Acid   


 


Calcium    8.4 L


 


Total Bilirubin    0.7


 


AST    78 H


 


ALT    21


 


Alkaline Phosphatase    217 H


 


Troponin I   


 


B-Natriuretic Peptide   


 


Total Protein    6.6


 


Albumin    2.1 L














  09/07/20 09/07/20





  22:50 22:50


 


WBC  


 


RBC  


 


Hgb  


 


Hct  


 


MCV  


 


MCH  


 


MCHC  


 


RDW  


 


Plt Count  


 


MPV  


 


Absolute Neuts (auto)  


 


Neutrophils %  


 


Lymphocytes %  


 


Monocytes %  


 


Eosinophils %  


 


Basophils %  


 


Nucleated RBC %  


 


PT with INR  


 


INR  


 


PTT (Actin FS)  


 


Sodium  


 


Potassium  


 


Chloride  


 


Carbon Dioxide  


 


Anion Gap  


 


BUN  


 


Creatinine  


 


Est GFR (CKD-EPI)AfAm  


 


Est GFR (CKD-EPI)NonAf  


 


Random Glucose  


 


Lactic Acid  1.9 


 


Calcium  


 


Total Bilirubin  


 


AST  


 


ALT  


 


Alkaline Phosphatase  


 


Troponin I   0.05


 


B-Natriuretic Peptide   > 80429.0 H


 


Total Protein  


 


Albumin  











ASSESSMENT/PLAN:


Mr. Lovelace is an 79 year old male with a past medical history of 

hypertension, CHF, aortic stenosis s/p bioprosthetic valve replacement,CAD s/p 

PCI,stent,GERD,TIA, BPH and indwelling swenson who presented from [facility] with hypotension. He was found to have leukocytosis/sepsis, acute renal 

insufficiency, hyperkalemia and hyponatremia. He is being admitted to telemetry 

for conservative medical treatment. As per health care proxy she does not want 

aggressive medical treatment. 





1. hypotension


likely in the setting of sepsis and acute renal failure  


improved with IVF


continue to monitor BP closely


hold coreg





2. acute renal failure /hyperkalemia


received 2 liters of IVF


c/w NS @ 75cc/hr 


repeat BMP in am


Nephrology consulted- Dr. Gross 





3. leukocytosis 


currently afebrile, lactic acid normal, UA w/ 3+ leukoesterase and 2+ blood  


received IV Vancomycin and pippercillin


c/w IV pippercillin 2.275gm q8hr 


urine and blood cultures pending  


Infectious Diseases- Dr. Montanez consulted for IV antibiotic recs in the 

setting of ARF





4. elevated BNP/Hx aortic stenosis with aortic valve replacement 


patient has a harsh systolic murmur


appears euvolemic on exam


monitor for signs of fluid overload  





5. hx CAD


denies active angina 


troponin normal 


c/w asa and statin 





6. BPH


c/w tamulosin 





7. hypopthyroidism 


c/w synthroid





8. rule out covid


follow up on covid test 


maintain o2 sat >90%


maintain strict contact and droplet isolation precautions 





FEN 


IVF NS @75cc/hr 


BMP daily 


low sodium diet 





DVT Prophylaxis 


heparin sq





Patient prognosis is poor.  Health care proxy does not want aggressive 

treatment. 





Resuscitation Status: DNR/DNI status














Family Medical History


Family History: Unable to Obtain





Visit type





- Medication Review


Med list reviewed for High Risk Meds patients 65 and older: Yes





- Emergency Visit


Emergency Visit: Yes


ED Registration Date: 09/07/20


Care time: The patient presented to the Emergency Department on the above date 

and was hospitalized for further evaluation of their emergent condition.





- New Patient


This patient is new to me today: Yes


Date on this admission: 09/08/20





- Critical Care


Critical Care patient: No

## 2020-09-08 NOTE — CONSULT
Consult


Consult Specialty:: Nephrology


Reason for Consultation:: JO ANN





- History of Present Illness


Chief Complaint: sent in for hypotension


History of Present Illness: 





Pt is an 79 year old male with pmhx of chf, htn, aortic stenosis, cad, gerd, 

tia, and bph with indwelling swenson who was sent in for hypotension.  He was 

found to be in acute renal failure. He is awake and able to answer some 

questions. He denies shortness of breath. I called his daughter Glenda 

[phone] and discussed his care. She does not want any aggressive measures 

including dialysis. He denies chest pain. He did respond to fluids. 





- History Source


History Provided By: Patient, Family Member, Medical Record





- Past Medical History


Cardio/Vascular: Yes: CHF, HTN





- Smoking History


Smoking history: Never smoked


Have you smoked in the past 12 months: No





Home Medications





- Allergies


Allergies/Adverse Reactions: 


                                    Allergies











Allergy/AdvReac Type Severity Reaction Status Date / Time


 


No Known Allergies Allergy   Verified 09/07/20 22:00














- Home Medications


Home Medications: 


Ambulatory Orders





Atorvastatin Ca [Lipitor] 20 mg PO HS 08/02/20 


Ergocalciferol (Vitamin D2) [Vitamin D2] 50,000 unit PO WEEKLY 08/02/20 


Ferrous Sulfate 325 mg PO DAILY 08/02/20 


Levothyroxine [Synthroid -] 100 mcg PO DAILY 08/02/20 


Tamsulosin HCl 0.4 mg PO DAILY 08/02/20 


Ascorbic Acid [Vitamin C -] 250 mg PO DAILY  tablet 08/06/20 


Aspirin [ASA -] 81 mg PO DAILY  tab.chew 08/06/20 


Carvedilol [Coreg -] 3.125 mg PO BID  tablet 08/06/20 


Docusate Sodium [Colace -] 100 mg PO BID  capsule 08/06/20 


Furosemide [Lasix -] 40 mg PO DAILY  tablet 08/06/20 


Pantoprazole Sodium [Protonix -] 20 mg PO DAILY  tablet.ec 08/06/20 


Acetaminophen 650 mg PO QID PRN 09/07/20 


Albuterol Sulfate [Proventil Hfa] 6.7 gm IH TID PRN 09/07/20 


Mag Hydrox/Aluminum Hyd/Simeth [Maalox Advanced Suspension] 30 ml PO BID PRN 

09/07/20 


Melatonin 3 mg PO HS 09/07/20 











Family Medical History


Family History: Denies





Review of Systems





- Review of Systems


Constitutional: reports: Loss of Appetite, Malaise, Weakness


Eyes: reports: No Symptoms


HENT: reports: No Symptoms


Neck: reports: No Symptoms


Cardiovascular: reports: No Symptoms


Respiratory: reports: No Symptoms


Gastrointestinal: reports: No Symptoms


Genitourinary: reports: Other (swenson)


Musculoskeletal: reports: Muscle Weakness


Hematology/Lymphatic: reports: No Symptoms


Psychiatric: reports: No Symptoms





Physical Exam


Vital Signs: 


                                   Vital Signs











Temperature  97.3 F L  09/07/20 21:55


 


Pulse Rate  65   09/08/20 06:08


 


Respiratory Rate  18   09/08/20 06:08


 


Blood Pressure  92/58 L  09/08/20 06:08


 


O2 Sat by Pulse Oximetry (%)  100   09/08/20 06:08











Constitutional: Yes: Calm


Eyes: Yes: Conjunctiva Clear


HENT: Yes: Atraumatic


Neck: Yes: Supple


Cardiovascular: Yes: S1, S2


Respiratory: Yes: On Nasal O2


Gastrointestinal: Yes: Soft


Renal/: Yes: Swenson Present


Musculoskeletal: Yes: Muscle Weakness


Edema: No


Integumentary: Yes: Venous Stasis Changes


Neurological: Yes: Oriented


Psychiatric: Yes: Oriented


Labs: 


                                    CBC, BMP





                                 09/07/20 22:50 





                                 09/07/20 22:50 











Imaging





- Results


Chest X-ray: Report Reviewed





Problem List





- Problems


(1) JO ANN (acute kidney injury)


Code(s): N17.9 - ACUTE KIDNEY FAILURE, UNSPECIFIED   





(2) Hyperkalemia


Code(s): E87.5 - HYPERKALEMIA   





(3) Sepsis


Code(s): A41.9 - SEPSIS, UNSPECIFIED ORGANISM   





Assessment/Plan





                               Current Medications











Generic Name Dose Route Start Last Admin





  Trade Name Freq  PRN Reason Stop Dose Admin


 


Albuterol Sulfate  2 puff  09/08/20 01:38 





  Ventolin Hfa Inhaler -  IH  





  TID PRN  





  ASTHMA  


 


Ascorbic Acid  250 mg  09/08/20 10:00 





  Vitamin C -  PO  





  DAILY ELLEN  


 


Aspirin  81 mg  09/08/20 10:00 





  Asa -  PO  





  DAILY ELLEN  


 


Atorvastatin Calcium  20 mg  09/08/20 22:00 





  Lipitor -  PO  





  HS ELLEN  


 


Docusate Sodium  100 mg  09/08/20 10:00 





  Colace -  PO  





  BID ELLEN  


 


Ergocalciferol  50,000 unit  09/08/20 01:45 





  Drisdol -  PO  





  WEEKLY ELLEN  


 


Ferrous Sulfate  325 mg  09/08/20 10:00 





  Feosol -  PO  





  DAILY ELLEN  


 


Heparin Sodium (Porcine)  5,000 unit  09/08/20 10:00 





  Heparin -  SQ  





  BID ELLEN  


 


Piperacillin Sod/Tazobactam  50 mls @ 100 mls/hr  09/08/20 02:00 





  Sod 2.25 gm/ Dextrose  IVPB  





  Q8H-IV ELLEN  





  Protocol  


 


Sodium Chloride  1,000 mls @ 75 mls/hr  09/08/20 02:15  09/08/20 02:16





  Normal Saline -  IV   75 mls/hr





  ASDIR ELLEN   Administration


 


Piperacillin Sod/Tazobactam  50 mls @ 100 mls/hr  09/08/20 10:00 





  Sod 2.25 gm/ Dextrose  IVPB  09/09/20 02:29 





  Q8H-IV [facility]  





  Protocol  


 


Levothyroxine Sodium  100 mcg  09/08/20 07:00  09/08/20 06:08





  Synthroid -  PO   100 mcg





  ACBK ELLEN   Administration


 


Pantoprazole Sodium  20 mg  09/08/20 07:00  09/08/20 06:08





  Protonix -  PO   20 mg





  ACBK ELLEN   Administration


 


Tamsulosin HCl  0.4 mg  09/08/20 08:30 





  Flomax -  PO  





  DAILY@0830 [facility]  





                                Laboratory Tests











  09/07/20 09/07/20 09/08/20





  22:50 22:50 10:05


 


Sodium    137


 


Potassium    4.7


 


BUN    176.2 H*


 


Creatinine    6.6 H


 


Lactic Acid  1.9  


 


B-Natriuretic Peptide   > 78558.0 H 











Impression


1. JO ANN


2. sepsis


3. hyperkalemia


4. hypotension


5. chf


6. bph


7. cad


8. tia


9. gerd





Plan


- renal function is improving with fluids


- gentle hydration as he has chf


- called and discussed with his daughter, she does not want hd or aggressive 

measures


- discussed with medical team


- renal dose meds


- ID eval


- follow cultures


- potassium is improved


- crt starting to improved


- will require hospital admission


- check ua, lytes and crt


- monitor bicarb

## 2020-09-08 NOTE — EKG
Test Reason : 

Blood Pressure : ***/*** mmHG

Vent. Rate : 073 BPM     Atrial Rate : 073 BPM

   P-R Int : 144 ms          QRS Dur : 206 ms

    QT Int : 482 ms       P-R-T Axes : 000 -81 083 degrees

   QTc Int : 531 ms

 

NORMAL SINUS RHYTHM

LEFT AXIS DEVIATION

NON-SPECIFIC INTRA-VENTRICULAR CONDUCTION BLOCK

INFERIOR INFARCT (CITED ON OR BEFORE 02-AUG-2020)

ANTEROLATERAL INFARCT (CITED ON OR BEFORE 02-AUG-2020)

ABNORMAL ECG

 

Confirmed by Kyle Oliva MD ([id number]) on 9/8/2020 10:52:36 AM

 

Referred By:             Confirmed By:Kyle Oliva MD

## 2020-09-09 LAB
ALBUMIN SERPL-MCNC: 1.9 G/DL (ref 3.4–5)
ALP SERPL-CCNC: 215 U/L (ref 45–117)
ALT SERPL-CCNC: 31 U/L (ref 13–61)
ANION GAP SERPL CALC-SCNC: 15 MMOL/L (ref 8–16)
ANISOCYTOSIS BLD QL: (no result)
AST SERPL-CCNC: 99 U/L (ref 15–37)
BASOPHILS # BLD: 0 % (ref 0–2)
BILIRUB SERPL-MCNC: 0.7 MG/DL (ref 0.2–1)
BUN SERPL-MCNC: > 150 MG/DL (ref 7–18)
CALCIUM SERPL-MCNC: 7.9 MG/DL (ref 8.5–10.1)
CHLORIDE SERPL-SCNC: 104 MMOL/L (ref 98–107)
CO2 SERPL-SCNC: 18 MMOL/L (ref 21–32)
CREAT SERPL-MCNC: 5.2 MG/DL (ref 0.55–1.3)
DEPRECATED RDW RBC AUTO: 17.9 % (ref 11.9–15.9)
DEPRECATED RDW RBC AUTO: 18 % (ref 11.9–15.9)
EOSINOPHIL # BLD: 0 % (ref 0–4.5)
GLUCOSE SERPL-MCNC: 64 MG/DL (ref 74–106)
HCT VFR BLD CALC: 34.3 % (ref 35.4–49)
HCT VFR BLD CALC: 37.4 % (ref 35.4–49)
HGB BLD-MCNC: 11 GM/DL (ref 11.7–16.9)
HGB BLD-MCNC: 12.1 GM/DL (ref 11.7–16.9)
LYMPHOCYTES # BLD: 1.4 % (ref 8–40)
MACROCYTES BLD QL: 0
MAGNESIUM SERPL-MCNC: 2.4 MG/DL (ref 1.8–2.4)
MCH RBC QN AUTO: 24.7 PG (ref 25.7–33.7)
MCH RBC QN AUTO: 25.5 PG (ref 25.7–33.7)
MCHC RBC AUTO-ENTMCNC: 32 G/DL (ref 32–35.9)
MCHC RBC AUTO-ENTMCNC: 32.4 G/DL (ref 32–35.9)
MCV RBC: 77.1 FL (ref 80–96)
MCV RBC: 78.7 FL (ref 80–96)
MONOCYTES # BLD AUTO: 3 % (ref 3.8–10.2)
NEUTROPHILS # BLD: 95.6 % (ref 42.8–82.8)
PLATELET # BLD AUTO: 102 K/MM3 (ref 134–434)
PLATELET # BLD AUTO: 108 K/MM3 (ref 134–434)
PLATELET BLD QL SMEAR: (no result)
PMV BLD: 8.9 FL (ref 7.5–11.1)
PMV BLD: 9.4 FL (ref 7.5–11.1)
POTASSIUM SERPLBLD-SCNC: 4.1 MMOL/L (ref 3.5–5.1)
PROT SERPL-MCNC: 6 G/DL (ref 6.4–8.2)
RBC # BLD AUTO: 4.44 M/MM3 (ref 4–5.6)
RBC # BLD AUTO: 4.76 M/MM3 (ref 4–5.6)
SODIUM SERPL-SCNC: 137 MMOL/L (ref 136–145)
TARGETS BLD QL SMEAR: (no result)
WBC # BLD AUTO: 34.2 K/MM3 (ref 4–10)
WBC # BLD AUTO: 35.2 K/MM3 (ref 4–10)

## 2020-09-09 RX ADMIN — PANTOPRAZOLE SODIUM SCH: 20 TABLET, DELAYED RELEASE ORAL at 09:01

## 2020-09-09 RX ADMIN — Medication SCH: at 11:22

## 2020-09-09 RX ADMIN — PIPERACILLIN SODIUM AND TAZOBACTAM SODIUM SCH MLS/HR: .25; 2 INJECTION, POWDER, LYOPHILIZED, FOR SOLUTION INTRAVENOUS at 03:12

## 2020-09-09 RX ADMIN — DOCUSATE SODIUM SCH: 100 CAPSULE, LIQUID FILLED ORAL at 11:22

## 2020-09-09 RX ADMIN — ATORVASTATIN CALCIUM SCH MG: 20 TABLET, FILM COATED ORAL at 22:11

## 2020-09-09 RX ADMIN — LEVOTHYROXINE SODIUM SCH: 100 TABLET ORAL at 09:01

## 2020-09-09 RX ADMIN — HEPARIN SODIUM SCH UNIT: 5000 INJECTION, SOLUTION INTRAVENOUS; SUBCUTANEOUS at 11:31

## 2020-09-09 RX ADMIN — FERROUS SULFATE TAB EC 324 MG (65 MG FE EQUIVALENT) SCH: 324 (65 FE) TABLET DELAYED RESPONSE at 11:22

## 2020-09-09 RX ADMIN — HEPARIN SODIUM SCH: 5000 INJECTION, SOLUTION INTRAVENOUS; SUBCUTANEOUS at 22:17

## 2020-09-09 RX ADMIN — PIPERACILLIN SODIUM AND TAZOBACTAM SODIUM SCH MLS/HR: .25; 2 INJECTION, POWDER, LYOPHILIZED, FOR SOLUTION INTRAVENOUS at 11:31

## 2020-09-09 RX ADMIN — HEPARIN SODIUM SCH UNIT: 5000 INJECTION, SOLUTION INTRAVENOUS; SUBCUTANEOUS at 22:11

## 2020-09-09 RX ADMIN — TAMSULOSIN HYDROCHLORIDE SCH: 0.4 CAPSULE ORAL at 09:24

## 2020-09-09 RX ADMIN — PIPERACILLIN SODIUM AND TAZOBACTAM SODIUM SCH MLS/HR: .25; 2 INJECTION, POWDER, LYOPHILIZED, FOR SOLUTION INTRAVENOUS at 17:46

## 2020-09-09 RX ADMIN — SODIUM BICARBONATE SCH: 84 INJECTION, SOLUTION INTRAVENOUS at 12:06

## 2020-09-09 RX ADMIN — ASPIRIN 81 MG SCH: 81 TABLET ORAL at 11:22

## 2020-09-09 RX ADMIN — SODIUM BICARBONATE SCH MLS/HR: 84 INJECTION, SOLUTION INTRAVENOUS at 09:53

## 2020-09-09 RX ADMIN — DOCUSATE SODIUM SCH: 100 CAPSULE, LIQUID FILLED ORAL at 22:09

## 2020-09-09 NOTE — PN
Progress Note, Physician





- Current Medication List


Current Medications: 


Active Medications





Albuterol Sulfate (Ventolin Hfa Inhaler -)  2 puff IH TID PRN


   PRN Reason: ASTHMA


Ascorbic Acid (Vitamin C -)  250 mg PO DAILY [facility]


   Last Admin: 09/08/20 09:45 Dose:  Not Given


   Documented by: 


Aspirin (Asa -)  81 mg PO DAILY [facility]


   Last Admin: 09/08/20 09:44 Dose:  Not Given


   Documented by: 


Atorvastatin Calcium (Lipitor -)  20 mg PO HS [facility]


   Last Admin: 09/08/20 22:37 Dose:  20 mg


   Documented by: 


Docusate Sodium (Colace -)  100 mg PO BID [facility]


   Last Admin: 09/08/20 22:09 Dose:  Not Given


   Documented by: 


Ergocalciferol (Drisdol -)  50,000 unit PO WEEKLY [facility]


Ferrous Sulfate (Feosol -)  325 mg PO DAILY [facility]


   Last Admin: 09/08/20 09:45 Dose:  Not Given


   Documented by: 


Heparin Sodium (Porcine) (Heparin -)  5,000 unit SQ BID [facility]


   Last Admin: 09/08/20 22:37 Dose:  5,000 unit


   Documented by: 


Sodium Bicarbonate 50 meq/ (Sodium Chloride)  1,050 mls @ 65 mls/hr IV Q16H [facility]


   Last Admin: 09/08/20 16:32 Dose:  65 mls/hr


   Documented by: 


Piperacillin Sod/Tazobactam (Sod 2.25 gm/ Dextrose)  50 mls @ 100 mls/hr IVPB 

Q8H-IV [facility]; Protocol


   Last Admin: 09/09/20 03:12 Dose:  100 mls/hr


   Documented by: 


Levothyroxine Sodium (Synthroid -)  100 mcg PO ACBK [facility]


   Last Admin: 09/08/20 06:08 Dose:  100 mcg


   Documented by: 


Pantoprazole Sodium (Protonix -)  20 mg PO ACBK [facility]


   Last Admin: 09/08/20 06:08 Dose:  20 mg


   Documented by: 


Tamsulosin HCl (Flomax -)  0.4 mg PO DAILY@0830 [facility]


   Last Admin: 09/08/20 08:54 Dose:  Not Given


   Documented by: 











- Objective


Vital Signs: 


                                   Vital Signs











Temperature  98.6 F   09/09/20 07:00


 


Pulse Rate  78   09/09/20 08:45


 


Respiratory Rate  17   09/09/20 08:32


 


Blood Pressure  118/73   09/09/20 08:45


 


O2 Sat by Pulse Oximetry (%)  100   09/09/20 08:32











Cardiovascular: Yes: S1, S2


Respiratory: Yes: Regular, CTA Bilaterally


Gastrointestinal: Yes: Normal Bowel Sounds, Soft.  No: Tenderness


Neurological: Yes: Alert, Weakness


Labs: 


                                    CBC, BMP





                                 09/09/20 06:13 





                                 09/09/20 06:13 





                                    INR, PTT











INR  0.95  (0.83-1.09)   09/07/20  22:50    














Problem List





- Problems


(1) Dysphagia


Assessment/Plan: 


swallow eval


Code(s): R13.10 - DYSPHAGIA, UNSPECIFIED   





(2) Weakness


Code(s): R53.1 - WEAKNESS   





(3) JO ANN (acute kidney injury)


Assessment/Plan: 








-IVF


-Nephrology consult


-Renal U/S


-UC pending


-Cr trending down


-Continue IVF








Code(s): N17.9 - ACUTE KIDNEY FAILURE, UNSPECIFIED   





(4) Chronic retention of urine


Assessment/Plan: 





-Continue swenson cath





Code(s): R33.9 - RETENTION OF URINE, UNSPECIFIED   





(5) Sepsis


Assessment/Plan: 


-ID consult


-IV Zosyn


-No Lactic acidosis


-Afebrile


-Cultures:


                                  Microbiology





09/07/20 22:50   Blood - Peripheral Venous   Blood Culture - Preliminary


                            Group D Strep Or Entero Coccus


                            Non Lactose Fermenting Gnb


09/07/20 22:50   Blood - Peripheral Venous   Blood Culture - Preliminary


                            Group D Strep Or Entero Coccus


                            Non Lactose Fermenting Gnb


09/08/20 00:27   Urine - Urine - Catheterized   Urine Culture - Final


                            Contaminated: Please Repeat








Code(s): A41.9 - SEPSIS, UNSPECIFIED ORGANISM

## 2020-09-09 NOTE — PN
Progress Note (short form)





- Note


Progress Note: 





more awake 


he wants orange juice


feels lousy





                                   Vital Signs











 Period  Temp  Pulse  Resp  BP Sys/Villa  Pulse Ox


 


 Last 24 Hr  98.1 F-98.7 F  65-78  16-22  /63-80  








cor-rrr


lungs decreased bs at bases


abd soft,nt


ext no edema


+swenson





                                    CBC, BMP





                                 09/09/20 10:35 





                                 09/09/20 06:13 





                                  Microbiology





09/07/20 22:50   Blood - Peripheral Venous   Blood Culture - Preliminary


                            Group D Strep Or Entero Coccus


                            Non Lactose Fermenting Gnb


09/07/20 22:50   Blood - Peripheral Venous   Blood Culture - Preliminary


                            Group D Strep Or Entero Coccus


                            Non Lactose Fermenting Gnb


09/08/20 00:27   Urine - Urine - Catheterized   Urine Culture - Final


                            Contaminated: Please Repeat











imp/reccd





polymicrobial bacteremia- suspect urinary source


UTI


ARF


AS with bioprosthetic aortic valve


DNR/DNI





vanco by levels-redosed this am, level in am





zosyn


repeat blood cultures





f/u cultures





will need echo with history of bioprosthetic valve

## 2020-09-09 NOTE — CONSULT
Consultation: 


REQUESTING PROVIDER: Dr. Multani





CONSULT REQUEST: We have been asked to medically evaluate this patient for 

leukocytosis.





HISTORY OF PRESENT ILLNESS:


Patient is an 79 year old male with past medical history of HTN, CHF, aortic 

stenosis, CAD, GERD, TIA, BPH on indwelling swenson and hypothyroidism, was 

brought in from [facility] for hypotension.  At the ED, patient was found to be 

hypotensive, with elevated wbc and in acute renal failure. He was started on IVF

to which he responded and IV antibiotics. Patient is lethargic, answers yes or 

no to some questions. He reports feeling unwell and fatigue, but denies any 

fevers, chills, headache, chest pain, shortness of breath, abdominal pain, 

diarrhea, urinary symptoms. During his hospital course, patient was found to 

have UTI and grp D strep and NLFGNB bacteremia. We were consulted for further 

evaluation of leukocytosis.





PMHx; HTN, CHF, aortic stenosis, CAD, GERD, TIA, BPH on indwelling swenson and 

hypothyroidism


PSHx:aortic valve replacement 


Allergies: NKDA


SHx: unable to obtain





REVIEW OF SYSTEMS:


CONSTITUTIONAL:  generalized weakness


Absent:  fever, chills, diaphoresis,malaise, loss of appetite, weight change


HEENT: 


Absent:  rhinorrhea, nasal congestion, throat pain, throat swelling, difficulty 

swallowing, mouth swelling, ear pain, eye pain, visual changes


CARDIOVASCULAR: 


Absent: chest pain, syncope, palpitations, irregular heart rate, 

lightheadedness, peripheral edema


RESPIRATORY: 


Absent: cough, shortness of breath, dyspnea with exertion, orthopnea, wheezing, 

stridor, hemoptysis


GASTROINTESTINAL:


Absent: abdominal pain, abdominal distension, nausea, vomiting, diarrhea, con

stipation, melena, hematochezia


GENITOURINARY: 


Absent: dysuria, frequency, urgency, hesitancy, hematuria, flank pain, genital 

pain


MUSCULOSKELETAL: 


Absent: myalgia, arthralgia, joint swelling, back pain, neck pain


SKIN: 


Absent: rash, itching, pallor


HEMATOLOGIC/IMMUNOLOGIC: 


Absent: easy bleeding, easy bruising, lymphadenopathy, frequent infections


ENDOCRINE:


Absent: unexplained weight gain, unexplained weight loss, heat intolerance, cold

intolerance


NEUROLOGIC: 


Absent: headache, focal weakness or paresthesias, dizziness, unsteady gait, 

seizure, mental status changes, bladder or bowel incontinence


PSYCHIATRIC: 


Absent: anxiety, depression, suicidal or homicidal ideation, hallucinations.





PHYSICAL EXAMINATION





                               Vital Signs - 24 hr











  09/09/20 09/09/20 09/09/20





  00:00 02:00 03:17


 


Temperature  98.7 F 


 


Pulse Rate   


 


Pulse Rate [ 66 68 65





Apical]   


 


Respiratory  17 22 H





Rate   


 


Blood Pressure   


 


Blood Pressure 98/63 105/73 101/75





[Right Arm]   


 


O2 Sat by Pulse 98 96 100





Oximetry (%)   














  09/09/20 09/09/20 09/09/20





  07:00 08:32 08:45


 


Temperature 98.6 F  


 


Pulse Rate   


 


Pulse Rate [ 74  78





Apical]   


 


Respiratory 17 17 





Rate   


 


Blood Pressure   


 


Blood Pressure 95/80  118/73





[Right Arm]   


 


O2 Sat by Pulse 100 100 





Oximetry (%)   














  09/09/20 09/09/20 09/09/20





  14:03 14:09 15:36


 


Temperature 98.4 F  98.1 F


 


Pulse Rate   72


 


Pulse Rate [ 66  





Apical]   


 


Respiratory 18 18 16





Rate   


 


Blood Pressure   112/64


 


Blood Pressure 102/66  





[Right Arm]   


 


O2 Sat by Pulse 100 100 99





Oximetry (%)   














GENERAL: lethargic, in no acute distress.


HEAD: Normal with no signs of trauma.


EYES:sclera anicteric, conjunctiva clear. 


EARS, NOSE, THROAT: dry mucous membranes.


NECK: Normal range of motion, supple 


LUNGS: decreased breath sounds on bilateral bases


HEART: Regular rate and rhythm, normal S1 and S2, +holosytolic murmur 


ABDOMEN: Soft, nontender, not distended, normoactive bowel sounds


LOWER EXTREMITIES: 2+ pulses, warm, well-perfused. No peripheral edema. 


SKIN: Warm, dry, normal [place]. 











                         Laboratory Results - last 24 hr











  09/08/20 09/09/20 09/09/20





  00:27 06:13 06:13


 


WBC   


 


RBC   


 


Hgb   


 


Hct   


 


MCV   


 


MCH   


 


MCHC   


 


RDW   


 


Plt Count   


 


MPV   


 


Absolute Neuts (auto)   


 


Neutrophils %   


 


Neutrophils % (Manual)   


 


Band Neutrophils %   


 


Lymphocytes %   


 


Lymphocytes % (Manual)   


 


Monocytes %   


 


Monocytes % (Manual)   


 


Eosinophils %   


 


Eosinophils % (Manual)   


 


Basophils %   


 


Basophils % (Manual)   


 


Myelocytes % (Man)   


 


Promyelocytes % (Man)   


 


Blast Cells % (Manual)   


 


Nucleated RBC %   


 


Metamyelocytes   


 


Hypochromia   


 


Platelet Estimate   


 


Polychromasia   


 


Poikilocytosis   


 


Anisocytosis   


 


Microcytosis   


 


Macrocytosis   


 


Target Cells   


 


Sally Cells   


 


Sodium    137


 


Potassium    4.1


 


Chloride    104


 


Carbon Dioxide    18 L


 


Anion Gap    15


 


BUN    > 150.0 H*


 


Creatinine    5.2 H


 


Est GFR (CKD-EPI)AfAm    11.18


 


Est GFR (CKD-EPI)NonAf    9.64


 


Random Glucose    64 L


 


Calcium    7.9 L


 


Magnesium    2.4


 


Total Bilirubin    0.7


 


AST    99 H


 


ALT    31


 


Alkaline Phosphatase    215 H


 


Total Protein    6.0 L


 


Albumin    1.9 L


 


Random Vancomycin   8.6 


 


COVID-19 (ELEN)  Not detected  














  09/09/20 09/09/20





  06:13 10:35


 


WBC  34.2 H*  35.2 H*


 


RBC  4.76  4.44


 


Hgb  12.1  11.0 L


 


Hct  37.4  34.3 L


 


MCV  78.7 L  77.1 L


 


MCH  25.5 L  24.7 L


 


MCHC  32.4  32.0


 


RDW  18.0 H  17.9 H


 


Plt Count  102 L  108 L


 


MPV  9.4  8.9


 


Absolute Neuts (auto)  32.7 H 


 


Neutrophils %  95.6 H 


 


Neutrophils % (Manual)  95.9 H 


 


Band Neutrophils %  0.0 


 


Lymphocytes %  1.4 L 


 


Lymphocytes % (Manual)  2.1 L D 


 


Monocytes %  3.0 L 


 


Monocytes % (Manual)  2 L 


 


Eosinophils %  0.0  D 


 


Eosinophils % (Manual)  0.0  D 


 


Basophils %  0.0 


 


Basophils % (Manual)  0.0 


 


Myelocytes % (Man)  0 


 


Promyelocytes % (Man)  0 


 


Blast Cells % (Manual)  0 


 


Nucleated RBC %  0 


 


Metamyelocytes  0 


 


Hypochromia  0 


 


Platelet Estimate  Decreased 


 


Polychromasia  0 


 


Poikilocytosis  0 


 


Anisocytosis  1+ 


 


Microcytosis  1+ 


 


Macrocytosis  0 


 


Target Cells  1+ 


 


[place] Cells  2+ 


 


Sodium  


 


Potassium  


 


Chloride  


 


Carbon Dioxide  


 


Anion Gap  


 


BUN  


 


Creatinine  


 


Est GFR (CKD-EPI)AfAm  


 


Est GFR (CKD-EPI)NonAf  


 


Random Glucose  


 


Calcium  


 


Magnesium  


 


Total Bilirubin  


 


AST  


 


ALT  


 


Alkaline Phosphatase  


 


Total Protein  


 


Albumin  


 


Random Vancomycin  


 


COVID-19 (ELEN)  








Active Medications











Generic Name Dose Route Start Last Admin





  Trade Name Freq  PRN Reason Stop Dose Admin


 


Albuterol Sulfate  2 puff  09/08/20 01:38 





  Ventolin Hfa Inhaler -  IH  





  TID PRN  





  ASTHMA  


 


Ascorbic Acid  250 mg  09/08/20 10:00  09/09/20 11:22





  Vitamin C -  PO   Not Given





  DAILY [facility]  


 


Aspirin  81 mg  09/08/20 10:00  09/09/20 11:22





  Asa -  PO   Not Given





  DAILY [facility]  


 


Atorvastatin Calcium  20 mg  09/08/20 22:00  09/08/20 22:37





  Lipitor -  PO   20 mg





  HS ELLEN   Administration


 


Docusate Sodium  100 mg  09/08/20 10:00  09/09/20 11:22





  Colace -  PO   Not Given





  BID ELLEN  


 


Ergocalciferol  50,000 unit  09/11/20 10:00 





  Drisdol -  PO  





  Fr@1000 ELLEN  


 


Ferrous Sulfate  325 mg  09/08/20 10:00  09/09/20 11:22





  Feosol -  PO   Not Given





  DAILY ELLEN  


 


Heparin Sodium (Porcine)  5,000 unit  09/08/20 10:00  09/09/20 11:31





  Heparin -  SQ   5,000 unit





  BID ELLEN   Administration


 


Piperacillin Sod/Tazobactam  50 mls @ 100 mls/hr  09/08/20 18:30  09/09/20 11:31





  Sod 2.25 gm/ Dextrose  IVPB   100 mls/hr





  Q8H-IV ELLEN   Administration





  Protocol  


 


Sodium Bicarbonate 50 meq/  1,050 mls @ 65 mls/hr  09/09/20 09:10  09/09/20 

09:53





  Sodium Chloride  IV   65 mls/hr





  Q16H ELLEN   Administration


 


Levothyroxine Sodium  100 mcg  09/08/20 07:00  09/09/20 09:01





  Synthroid -  PO   Not Given





  ACBK [facility]  


 


Pantoprazole Sodium  20 mg  09/08/20 07:00  09/09/20 09:01





  Protonix -  PO   Not Given





  ACBK ELLEN  


 


Tamsulosin HCl  0.4 mg  09/08/20 08:30  09/09/20 09:24





  Flomax -  PO   Not Given





  DAILY@0830 [facility]  











ASSESSMENT/PLAN:


Patient is an 79 year old male with past medical history of HTN, CHF, aortic 

stenosis, CAD, GERD, TIA, BPH on indwelling swenson and hypothyroidism, was 

brought in from [facility] for hypotension. Patient was found to have UTI and 

bacteremia. We have been asked to medically evaluate this patient for 

leukocytosis.





#Leukocytosis


-neutrophilia with left shift most likely 2/2 sepsis 2/2 bacteremia and UTI


-peripheral smear no bands or blast cells


-continue IV antibiotics


-trend cbc


-





Dispo: We will continue to follow the patient. Thank you for this consultative o

pportunity.











Visit type





- Medication Review


Med list reviewed for High Risk Meds patients 65 and older: Yes





- Emergency Visit


Emergency Visit: Yes


ED Registration Date: 09/07/20


Care time: The patient presented to the Emergency Department on the above date 

and was hospitalized for further evaluation of their emergent condition.





- New Patient


This patient is new to me today: Yes


Date on this admission: 09/09/20





- Critical Care


Critical Care patient: No





ATTENDING PHYSICIAN STATEMENT





I saw and evaluated the patient.


I reviewed the resident's note and discussed the case with the resident.


I agree with the resident's findings and plan as documented.








SUBJECTIVE:








OBJECTIVE:








ASSESSMENT AND PLAN:

## 2020-09-09 NOTE — PN
Progress Note, Physician


History of Present Illness: 





Pt seen and examined at bedside. He is awake but appears fatigued. He denies 

shortness of breath. 





- Current Medication List


Current Medications: 


Active Medications





Albuterol Sulfate (Ventolin Hfa Inhaler -)  2 puff IH TID PRN


   PRN Reason: ASTHMA


Ascorbic Acid (Vitamin C -)  250 mg PO DAILY [facility]


   Last Admin: 09/09/20 11:22 Dose:  Not Given


   Documented by: 


Aspirin (Asa -)  81 mg PO DAILY [facility]


   Last Admin: 09/09/20 11:22 Dose:  Not Given


   Documented by: 


Atorvastatin Calcium (Lipitor -)  20 mg PO HS [facility]


   Last Admin: 09/08/20 22:37 Dose:  20 mg


   Documented by: 


Docusate Sodium (Colace -)  100 mg PO BID [facility]


   Last Admin: 09/09/20 11:22 Dose:  Not Given


   Documented by: 


Ergocalciferol (Drisdol -)  50,000 unit PO Fr@1000 ELLEN


Ferrous Sulfate (Feosol -)  325 mg PO DAILY [facility]


   Last Admin: 09/09/20 11:22 Dose:  Not Given


   Documented by: 


Heparin Sodium (Porcine) (Heparin -)  5,000 unit SQ BID [facility]


   Last Admin: 09/09/20 11:31 Dose:  5,000 unit


   Documented by: 


Piperacillin Sod/Tazobactam (Sod 2.25 gm/ Dextrose)  50 mls @ 100 mls/hr IVPB 

Q8H-IV ELLEN; Protocol


   Last Admin: 09/09/20 17:46 Dose:  100 mls/hr


   Documented by: 


Sodium Bicarbonate 50 meq/ (Sodium Chloride)  1,050 mls @ 65 mls/hr IV Q16H [facility]


   Last Admin: 09/09/20 09:53 Dose:  65 mls/hr


   Documented by: 


Levothyroxine Sodium (Synthroid -)  100 mcg PO ACBK [facility]


   Last Admin: 09/09/20 09:01 Dose:  Not Given


   Documented by: 


Pantoprazole Sodium (Protonix -)  20 mg PO ACBK [facility]


   Last Admin: 09/09/20 09:01 Dose:  Not Given


   Documented by: 


Tamsulosin HCl (Flomax -)  0.4 mg PO DAILY@0830 [facility]


   Last Admin: 09/09/20 09:24 Dose:  Not Given


   Documented by: 











- Objective


Vital Signs: 


                                   Vital Signs











Temperature  98.1 F   09/09/20 15:36


 


Pulse Rate  72   09/09/20 15:36


 


Respiratory Rate  16   09/09/20 15:36


 


Blood Pressure  112/64   09/09/20 15:36


 


O2 Sat by Pulse Oximetry (%)  99   09/09/20 16:01











Constitutional: Yes: Calm


Eyes: Yes: Conjunctiva Clear


Cardiovascular: Yes: S1, S2


Respiratory: Yes: On Nasal O2


Gastrointestinal: Yes: Soft


Genitourinary: Yes: Incontinence


Musculoskeletal: Yes: Muscle Weakness


Edema: No


Neurological: Yes: Confusion


Labs: 


                                    CBC, BMP





                                 09/09/20 10:35 





                                 09/09/20 06:13 





                                    INR, PTT











INR  0.95  (0.83-1.09)   09/07/20  22:50    














Problem List





- Problems


(1) JO ANN (acute kidney injury)


Code(s): N17.9 - ACUTE KIDNEY FAILURE, UNSPECIFIED   





(2) Hyperkalemia


Code(s): E87.5 - HYPERKALEMIA   





(3) Sepsis


Code(s): A41.9 - SEPSIS, UNSPECIFIED ORGANISM   





Assessment/Plan


                               Current Medications











Generic Name Dose Route Start Last Admin





  Trade Name Freq  PRN Reason Stop Dose Admin


 


Albuterol Sulfate  2 puff  09/08/20 01:38 





  Ventolin Hfa Inhaler -  IH  





  TID PRN  





  ASTHMA  


 


Ascorbic Acid  250 mg  09/08/20 10:00  09/09/20 11:22





  Vitamin C -  PO   Not Given





  DAILY [facility]  


 


Aspirin  81 mg  09/08/20 10:00  09/09/20 11:22





  Asa -  PO   Not Given





  DAILY [facility]  


 


Atorvastatin Calcium  20 mg  09/08/20 22:00  09/08/20 22:37





  Lipitor -  PO   20 mg





  HS ELLEN   Administration


 


Docusate Sodium  100 mg  09/08/20 10:00  09/09/20 11:22





  Colace -  PO   Not Given





  BID ELLEN  


 


Ergocalciferol  50,000 unit  09/11/20 10:00 





  Drisdol -  PO  





  Fr@1000 ELLEN  


 


Ferrous Sulfate  325 mg  09/08/20 10:00  09/09/20 11:22





  Feosol -  PO   Not Given





  DAILY [facility]  


 


Heparin Sodium (Porcine)  5,000 unit  09/08/20 10:00  09/09/20 11:31





  Heparin -  SQ   5,000 unit





  BID ELLEN   Administration


 


Piperacillin Sod/Tazobactam  50 mls @ 100 mls/hr  09/08/20 18:30  09/09/20 17:46





  Sod 2.25 gm/ Dextrose  IVPB   100 mls/hr





  Q8H-IV ELLEN   Administration





  Protocol  


 


Sodium Bicarbonate 50 meq/  1,050 mls @ 65 mls/hr  09/09/20 09:10  09/09/20 

09:53





  Sodium Chloride  IV   65 mls/hr





  Q16H ELLEN   Administration


 


Levothyroxine Sodium  100 mcg  09/08/20 07:00  09/09/20 09:01





  Synthroid -  PO   Not Given





  ACBK ELLEN  


 


Pantoprazole Sodium  20 mg  09/08/20 07:00  09/09/20 09:01





  Protonix -  PO   Not Given





  ACBK ELLEN  


 


Tamsulosin HCl  0.4 mg  09/08/20 08:30  09/09/20 09:24





  Flomax -  PO   Not Given





  DAILY@0830 [facility]  











Impression


1. JO ANN


2. sepsis


3. hyperkalemia


4. hypotension


5. chf


6. bph


7. cad


8. tia


9. gerd





Plan


- renal function continues to improve


- cont current fluids


- repeat labs in am


- daughter does not want any aggressive measures


- follow cultures


- potassium is improved

## 2020-09-09 NOTE — CONSULT
Admitting History and Physical





- Admission


History of Present Illness: 





79 year old male BIBA from [facility] for hypotension (SBP in the 70's).

He has a past medical history of hypertension, CHF, aortic stenosis s/p 

bioprosthetic valve replacement, CAD, prior stent, GERD,TIA,BPH and indwelling 

swenson. 





NAD


c/o fatigue


lethargic


Denies any pain or SOB


                                Selected Entries











  09/09/20 09/09/20 09/09/20





  00:00 02:00 03:17


 


Temperature  98.7 F 


 


Pulse Rate [ 66 68 65





Apical]   


 


Blood Pressure 98/63 105/73 101/75





[Right Arm]   














  09/09/20 09/09/20





  07:00 08:45


 


Temperature 98.6 F 


 


Pulse Rate [ 74 78





Apical]  


 


Blood Pressure 95/80 118/73





[Right Arm]  








                                Laboratory Tests











  09/07/20 09/08/20 09/09/20





  22:50 00:27 06:13


 


WBC  34.9 H*  


 


BUN    > 150.0 H*


 


COVID-19 (ELEN)   Pending 














  09/09/20





  06:13


 


WBC  34.2 H*


 


BUN 


 


COVID-19 (ELEN) 








ID imp-gram positive bacteremia- ?strep


UTI


ARF


AS with bioprosthetic aortic valve


DNR/DNI


History Source: Patient


Limitations to Obtaining History: Clinical Condition





- Past Medical History


Cardiovascular: Yes: CHF, HTN





- Smoking History


Smoking history: Never smoked


Have you smoked in the past 12 months: No





History





- Admission


Reason For Visit: SEPSIS, HYPOTENSION





- Diagnostics


X-ray: Report Reviewed





- General


Mental Status: Awake and Alert, Able to Follow Commands, Vague, Intermittently 

Confused


Attention: Intact


Ability to Follow Directions: Good


Head/Neck Control: WFL





- Hearing


Hearing: Functional





Speech Evaluation





- Communication


Primary Language: ENGLISH


Communication: Yes: Within Normal Limits


Oral Expression Ability: Yes: No Impairment





- Speech Production


Able to Make Needs Known: Yes: WNL


Intelligibility: Yes: WNL





- Speech Characteristics


Voice Loudness: Normal


Voice Pitch: Yes: Normal


Voice Phonatory-based Quality: Yes: Normal


Speech Pattern: Normal


Speech Clarity: < 100%


Nasal Resonance: Normal


Articulation: Yes: Precise





- Language/Auditory Comprehension


Follows: Yes: 2 Stage Simple Commands


Observation: Able to respond to yes/no queries: Yes, Yes/No Confusion: No, 

Comprehends Conversational Speech: Yes





- Language/Verbal Expression


Able to Respond to Simple Queries: Yes: WNL





- Swallow Evaluation/Bedside Assessment


Current Nutritional Intake: NPO


Oral Secretions: Yes: Dryness


Facial Symmetry at Rest: Symmetrical


Facial Symmetry on Retraction: Symmetrical


Against Resistance Opening: Normal


Against Resistance Closing: Normal


Pucker Lips: Normal


Smile: Normal


Lingual Movement: Normal, Symmetric


Lingual Speed of Movement: Normal


Lingual Movement Strgth Against Opposition: Normal


Lingual Movement Characteristics: Normal


Velopharyngeal Movement: Normal


Laryngeal Elevation: Impaired


Laryngeal Movement: Reduced Excursion, Labored,delay initiation


Rate of Intake: Slow/Holding


Bolus Size: Small


Labial Seal: WFL


Chewing: Impaired


Oral Prep Time: Increased


A-P Transit: Impaired


Pocketing: None


Timing of Swallow: Delayed


Coughing/Throat Clear: No


Change in Voice: No





Recommendations





- Speech Evaluation, Impression/Plan


Impression: with oral holding, effortful swallow onset. Overtly tolerated 1 sip 

of water, 2 1/2 tsp puree. Resistent for PO trials but seems to tolerate?





- Dysphagia Impressions/Plan


Swallowing Skills: Impaired


Dysphagia Impressions: Ongoing Evaluation


*Silent aspiration: cannot be R/O at bedside


Dysphagia Treatment Plan: Small Bites, Chin Tuck/Down, Clear Pocket Food, Safe 

Rate, 1/2 tsp. at a time, Elevate HOB during feed


Recommendations: Modified Barium Swallow (if difficulty persists)





- Recommendations


Diet Consistency: Dysphagia Pureed


Medication Administration: Crushed with applesauce


Liquids: Nectar Thick


Supplement: Ensure Pudding, Other (2 adore hn)

## 2020-09-10 LAB
ALBUMIN SERPL-MCNC: 1.8 G/DL (ref 3.4–5)
ALP SERPL-CCNC: 206 U/L (ref 45–117)
ALT SERPL-CCNC: 26 U/L (ref 13–61)
ANION GAP SERPL CALC-SCNC: 14 MMOL/L (ref 8–16)
ANISOCYTOSIS BLD QL: (no result)
AST SERPL-CCNC: 84 U/L (ref 15–37)
BASOPHILS # BLD: 0.5 % (ref 0–2)
BILIRUB SERPL-MCNC: 0.9 MG/DL (ref 0.2–1)
BUN SERPL-MCNC: 142.8 MG/DL (ref 7–18)
CALCIUM SERPL-MCNC: 7.8 MG/DL (ref 8.5–10.1)
CHLORIDE SERPL-SCNC: 109 MMOL/L (ref 98–107)
CO2 SERPL-SCNC: 19 MMOL/L (ref 21–32)
CREAT SERPL-MCNC: 3.7 MG/DL (ref 0.55–1.3)
DEPRECATED RDW RBC AUTO: 17.5 % (ref 11.9–15.9)
EOSINOPHIL # BLD: 0.2 % (ref 0–4.5)
GLUCOSE SERPL-MCNC: 90 MG/DL (ref 74–106)
HCT VFR BLD CALC: 37.6 % (ref 35.4–49)
HGB BLD-MCNC: 12.1 GM/DL (ref 11.7–16.9)
LYMPHOCYTES # BLD: 1.8 % (ref 8–40)
MACROCYTES BLD QL: (no result)
MCH RBC QN AUTO: 25.6 PG (ref 25.7–33.7)
MCHC RBC AUTO-ENTMCNC: 32.2 G/DL (ref 32–35.9)
MCV RBC: 79.3 FL (ref 80–96)
MONOCYTES # BLD AUTO: 2.6 % (ref 3.8–10.2)
NEUTROPHILS # BLD: 94.9 % (ref 42.8–82.8)
PLATELET # BLD AUTO: 131 K/MM3 (ref 134–434)
PLATELET BLD QL SMEAR: (no result)
PMV BLD: 9.1 FL (ref 7.5–11.1)
POTASSIUM SERPLBLD-SCNC: 3.5 MMOL/L (ref 3.5–5.1)
PROT SERPL-MCNC: 6 G/DL (ref 6.4–8.2)
RBC # BLD AUTO: 4.75 M/MM3 (ref 4–5.6)
SODIUM SERPL-SCNC: 142 MMOL/L (ref 136–145)
WBC # BLD AUTO: 28.3 K/MM3 (ref 4–10)

## 2020-09-10 RX ADMIN — PANTOPRAZOLE SODIUM SCH MG: 20 TABLET, DELAYED RELEASE ORAL at 06:31

## 2020-09-10 RX ADMIN — PIPERACILLIN SODIUM AND TAZOBACTAM SODIUM SCH MLS/HR: .25; 2 INJECTION, POWDER, LYOPHILIZED, FOR SOLUTION INTRAVENOUS at 10:18

## 2020-09-10 RX ADMIN — HEPARIN SODIUM SCH UNIT: 5000 INJECTION, SOLUTION INTRAVENOUS; SUBCUTANEOUS at 10:17

## 2020-09-10 RX ADMIN — ASPIRIN 81 MG SCH MG: 81 TABLET ORAL at 10:32

## 2020-09-10 RX ADMIN — Medication SCH MG: at 10:30

## 2020-09-10 RX ADMIN — PIPERACILLIN SODIUM AND TAZOBACTAM SODIUM SCH MLS/HR: .25; 2 INJECTION, POWDER, LYOPHILIZED, FOR SOLUTION INTRAVENOUS at 01:25

## 2020-09-10 RX ADMIN — HEPARIN SODIUM SCH UNIT: 5000 INJECTION, SOLUTION INTRAVENOUS; SUBCUTANEOUS at 21:53

## 2020-09-10 RX ADMIN — SODIUM BICARBONATE SCH MLS/HR: 84 INJECTION, SOLUTION INTRAVENOUS at 01:24

## 2020-09-10 RX ADMIN — LEVOTHYROXINE SODIUM SCH MCG: 100 TABLET ORAL at 06:31

## 2020-09-10 RX ADMIN — TAMSULOSIN HYDROCHLORIDE SCH MG: 0.4 CAPSULE ORAL at 10:17

## 2020-09-10 RX ADMIN — DOCUSATE SODIUM SCH: 100 CAPSULE, LIQUID FILLED ORAL at 10:17

## 2020-09-10 RX ADMIN — DOCUSATE SODIUM SCH: 100 CAPSULE, LIQUID FILLED ORAL at 21:52

## 2020-09-10 RX ADMIN — ACETAMINOPHEN PRN MG: 500 TABLET, FILM COATED ORAL at 19:47

## 2020-09-10 RX ADMIN — ATORVASTATIN CALCIUM SCH MG: 20 TABLET, FILM COATED ORAL at 21:53

## 2020-09-10 RX ADMIN — SODIUM CHLORIDE SCH MLS/HR: 4.5 INJECTION, SOLUTION INTRAVENOUS at 15:11

## 2020-09-10 RX ADMIN — PIPERACILLIN SODIUM AND TAZOBACTAM SODIUM SCH MLS/HR: .25; 2 INJECTION, POWDER, LYOPHILIZED, FOR SOLUTION INTRAVENOUS at 17:58

## 2020-09-10 RX ADMIN — FERROUS SULFATE TAB EC 324 MG (65 MG FE EQUIVALENT) SCH MG: 324 (65 FE) TABLET DELAYED RESPONSE at 10:17

## 2020-09-10 NOTE — PN
Progress Note, SLP





- Note


Progress Note: 





                                Selected Entries











  09/09/20 09/10/20 09/10/20





  18:41 02:00 05:56


 


Supper NPO  


 


Temperature  97.6 F 98 F


 


Pulse Rate  71 84


 


Blood Pressure  113/58 L 118/69








On puree/nectar.


Tolerated thickened liquid on 5s, tsp at a time


Voice dysphonic which pt reports is not baseline.


Cough response on sip of water, c/w aspiration





Rec-hold lunch


mbs to further assess swallowing function

## 2020-09-10 NOTE — PN
Progress Note, Physician





- Current Medication List


Current Medications: 


Active Medications





Albuterol Sulfate (Ventolin Hfa Inhaler -)  2 puff IH TID PRN


   PRN Reason: ASTHMA


Ascorbic Acid (Vitamin C -)  250 mg PO DAILY [facility]


   Last Admin: 09/09/20 11:22 Dose:  Not Given


   Documented by: 


Aspirin (Asa -)  81 mg PO DAILY [facility]


   Last Admin: 09/09/20 11:22 Dose:  Not Given


   Documented by: 


Atorvastatin Calcium (Lipitor -)  20 mg PO HS [facility]


   Last Admin: 09/09/20 22:11 Dose:  20 mg


   Documented by: 


Docusate Sodium (Colace -)  100 mg PO BID [facility]


   Last Admin: 09/09/20 22:09 Dose:  Not Given


   Documented by: 


Ergocalciferol (Drisdol -)  50,000 unit PO Fr@1000 ELLEN


Ferrous Sulfate (Feosol -)  325 mg PO DAILY [facility]


   Last Admin: 09/09/20 11:22 Dose:  Not Given


   Documented by: 


Heparin Sodium (Porcine) (Heparin -)  5,000 unit SQ BID [facility]


   Last Admin: 09/09/20 22:17 Dose:  Not Given


   Documented by: 


Piperacillin Sod/Tazobactam (Sod 2.25 gm/ Dextrose)  50 mls @ 100 mls/hr IVPB 

Q8H-IV [facility]; Protocol


   Last Admin: 09/10/20 01:25 Dose:  100 mls/hr


   Documented by: 


Sodium Bicarbonate 50 meq/ (Sodium Chloride)  1,050 mls @ 65 mls/hr IV Q16H [facility]


   Last Admin: 09/10/20 01:24 Dose:  65 mls/hr


   Documented by: 


Levothyroxine Sodium (Synthroid -)  100 mcg PO ACBK [facility]


   Last Admin: 09/10/20 06:31 Dose:  100 mcg


   Documented by: 


Pantoprazole Sodium (Protonix -)  20 mg PO ACBK [facility]


   Last Admin: 09/10/20 06:31 Dose:  20 mg


   Documented by: 


Tamsulosin HCl (Flomax -)  0.4 mg PO DAILY@0830 [facility]


   Last Admin: 09/09/20 09:24 Dose:  Not Given


   Documented by: 











- Objective


Vital Signs: 


                                   Vital Signs











Temperature  98 F   09/10/20 05:56


 


Pulse Rate  84   09/10/20 05:56


 


Respiratory Rate  18   09/10/20 05:56


 


Blood Pressure  118/69   09/10/20 05:56


 


O2 Sat by Pulse Oximetry (%)  100   09/10/20 05:56











Cardiovascular: Yes: Murmur, S1, S2


Respiratory: Yes: Regular, CTA Bilaterally


Gastrointestinal: Yes: Normal Bowel Sounds, Soft


Neurological: Yes: Alert, Weakness


Labs: 


                                    CBC, BMP





                                 09/09/20 10:35 





                                 09/09/20 06:13 





                                    INR, PTT











INR  0.95  (0.83-1.09)   09/07/20  22:50    














Problem List





- Problems


(1) Dysphagia


Assessment/Plan: 


swallow eval noted


dysphagia diet


Code(s): R13.10 - DYSPHAGIA, UNSPECIFIED   





(2) Weakness


Code(s): R53.1 - WEAKNESS   





(3) JO ANN (acute kidney injury)


Assessment/Plan: 








-IVF


-Nephrology consult


-Renal U/S


-UC pending


-Cr trending down


-Continue IVF








Code(s): N17.9 - ACUTE KIDNEY FAILURE, UNSPECIFIED   





(4) Chronic retention of urine


Assessment/Plan: 





-Continue swenson cath





Code(s): R33.9 - RETENTION OF URINE, UNSPECIFIED   





(5) Sepsis


Assessment/Plan: 


-ID consult appreciated


-IV Zosyn


-No Lactic acidosis


-Afebrile


-Cultures:


                                  Microbiology





09/07/20 22:50   Blood - Peripheral Venous   Blood Culture - Preliminary


                            Group D Strep Or Entero Coccus


                            Non Lactose Fermenting Gnb


09/07/20 22:50   Blood - Peripheral Venous   Blood Culture - Preliminary


                            Group D Strep Or Entero Coccus


                            Non Lactose Fermenting Gnb


09/08/20 00:27   Urine - Urine - Catheterized   Urine Culture - Final


                            Contaminated: Please Repeat





Echo


Code(s): A41.9 - SEPSIS, UNSPECIFIED ORGANISM

## 2020-09-10 NOTE — PN
Teaching Attending Note


Name of Resident: Kyleigh Mckeon





ATTENDING PHYSICIAN STATEMENT





I saw and evaluated the patient.


I reviewed the resident's note and discussed the case with the resident.


I agree with the resident's findings and plan as documented.











ASSESSMENT AND PLAN:





Patient is an 79 year old male with past medical history of HTN, CHF, aortic 

stenosis, CAD, GERD, TIA, BPH on indwelling swenson and hypothyroidism, was 

brought in from [facility] for hypotension. Patient was found to have UTI and 

bacteremia. We have been asked to medically evaluate this patient for 

leukocytosis.





#Leukocytosis


-neutrophilia with left shift most likely 2/2 sepsis 2/2 bacteremia and UTI


-peripheral smear no bands or blast cells


-continue IV antibiotics


-trend cbc





will follow


-

## 2020-09-10 NOTE — PN
Progress Note, Physician


History of Present Illness: 





Pt seen and examined at bedside. He denies shortness of breath. He is awake. 





- Current Medication List


Current Medications: 


Active Medications





Albuterol Sulfate (Ventolin Hfa Inhaler -)  2 puff IH TID PRN


   PRN Reason: ASTHMA


Ascorbic Acid (Vitamin C -)  250 mg PO DAILY [facility]


   Last Admin: 09/10/20 10:30 Dose:  250 mg


   Documented by: 


Aspirin (Asa -)  81 mg PO DAILY [facility]


   Last Admin: 09/10/20 10:32 Dose:  81 mg


   Documented by: 


Atorvastatin Calcium (Lipitor -)  20 mg PO HS [facility]


   Last Admin: 09/09/20 22:11 Dose:  20 mg


   Documented by: 


Docusate Sodium (Colace -)  100 mg PO BID [facility]


   Last Admin: 09/10/20 10:17 Dose:  Not Given


   Documented by: 


Ergocalciferol (Drisdol -)  50,000 unit PO Fr@1000 [facility]


Ferrous Sulfate (Feosol -)  325 mg PO DAILY [facility]


   Last Admin: 09/10/20 10:17 Dose:  325 mg


   Documented by: 


Heparin Sodium (Porcine) (Heparin -)  5,000 unit SQ BID [facility]


   Last Admin: 09/10/20 10:17 Dose:  5,000 unit


   Documented by: 


Piperacillin Sod/Tazobactam (Sod 2.25 gm/ Dextrose)  50 mls @ 100 mls/hr IVPB 

Q8H-IV [facility]; Protocol


   Last Admin: 09/10/20 10:18 Dose:  100 mls/hr


   Documented by: 


Sodium Bicarbonate 50 meq/ (Sodium Chloride)  1,050 mls @ 65 mls/hr IV Q16H [facility]


   Last Admin: 09/10/20 01:24 Dose:  65 mls/hr


   Documented by: 


Levothyroxine Sodium (Synthroid -)  100 mcg PO ACBK [facility]


   Last Admin: 09/10/20 06:31 Dose:  100 mcg


   Documented by: 


Pantoprazole Sodium (Protonix -)  20 mg PO ACBK [facility]


   Last Admin: 09/10/20 06:31 Dose:  20 mg


   Documented by: 


Tamsulosin HCl (Flomax -)  0.4 mg PO DAILY@0830 [facility]


   Last Admin: 09/10/20 10:17 Dose:  0.4 mg


   Documented by: 











- Objective


Vital Signs: 


                                   Vital Signs











Temperature  98 F   09/10/20 05:56


 


Pulse Rate  84   09/10/20 05:56


 


Respiratory Rate  18   09/10/20 05:56


 


Blood Pressure  118/69   09/10/20 05:56


 


O2 Sat by Pulse Oximetry (%)  100   09/10/20 05:56











Constitutional: Yes: Calm


Eyes: Yes: Conjunctiva Clear


HENT: Yes: Atraumatic


Neck: Yes: Supple


Cardiovascular: Yes: S1, S2


Respiratory: Yes: CTA Bilaterally


Gastrointestinal: Yes: Soft


Genitourinary: Yes: Rios Present


Musculoskeletal: Yes: Muscle Weakness


Edema: No


Neurological: Yes: Oriented


Psychiatric: Yes: Oriented


Labs: 


                                    CBC, BMP





                                 09/10/20 09:37 





                                 09/10/20 09:37 





                                    INR, PTT











INR  0.95  (0.83-1.09)   09/07/20  22:50    














Problem List





- Problems


(1) JO ANN (acute kidney injury)


Code(s): N17.9 - ACUTE KIDNEY FAILURE, UNSPECIFIED   





(2) Hyperkalemia


Code(s): E87.5 - HYPERKALEMIA   





(3) Sepsis


Code(s): A41.9 - SEPSIS, UNSPECIFIED ORGANISM   





Assessment/Plan


                               Current Medications











Generic Name Dose Route Start Last Admin





  Trade Name Freq  PRN Reason Stop Dose Admin


 


Albuterol Sulfate  2 puff  09/08/20 01:38 





  Ventolin Hfa Inhaler -  IH  





  TID PRN  





  ASTHMA  


 


Ascorbic Acid  250 mg  09/08/20 10:00  09/10/20 10:30





  Vitamin C -  PO   250 mg





  DAILY [facility]   Administration


 


Aspirin  81 mg  09/08/20 10:00  09/10/20 10:32





  Asa -  PO   81 mg





  DAILY [facility]   Administration


 


Atorvastatin Calcium  20 mg  09/08/20 22:00  09/09/20 22:11





  Lipitor -  PO   20 mg





  HS [facility]   Administration


 


Docusate Sodium  100 mg  09/08/20 10:00  09/10/20 10:17





  Colace -  PO   Not Given





  BID [facility]  


 


Ergocalciferol  50,000 unit  09/11/20 10:00 





  Drisdol -  PO  





  Fr@1000 [facility]  


 


Ferrous Sulfate  325 mg  09/08/20 10:00  09/10/20 10:17





  Feosol -  PO   325 mg





  DAILY [facility]   Administration


 


Heparin Sodium (Porcine)  5,000 unit  09/08/20 10:00  09/10/20 10:17





  Heparin -  SQ   5,000 unit





  BID [facility]   Administration


 


Piperacillin Sod/Tazobactam  50 mls @ 100 mls/hr  09/08/20 18:30  09/10/20 10:18





  Sod 2.25 gm/ Dextrose  IVPB   100 mls/hr





  Q8H-IV [facility]   Administration





  Protocol  


 


Sodium Bicarbonate 50 meq/  1,050 mls @ 65 mls/hr  09/09/20 09:10  09/10/20 

01:24





  Sodium Chloride  IV   65 mls/hr





  Q16H [facility]   Administration


 


Levothyroxine Sodium  100 mcg  09/08/20 07:00  09/10/20 06:31





  Synthroid -  PO   100 mcg





  ACBK [facility]   Administration


 


Pantoprazole Sodium  20 mg  09/08/20 07:00  09/10/20 06:31





  Protonix -  PO   20 mg





  ACBK [facility]   Administration


 


Tamsulosin HCl  0.4 mg  09/08/20 08:30  09/10/20 10:17





  Flomax -  PO   0.4 mg





  DAILY@0830 [facility]   Administration











Impression


1. JO ANN


2. sepsis


3. hyperkalemia


4. hypotension


5. chf


6. bph


7. cad


8. tia


9. gerd





Plan


- replace potassium


- change fluids to 1/2 ns


- repeat labs in am


- renal function is improving


- mental status improving

## 2020-09-11 LAB
ALBUMIN SERPL-MCNC: 1.6 G/DL (ref 3.4–5)
ALP SERPL-CCNC: 163 U/L (ref 45–117)
ALT SERPL-CCNC: 19 U/L (ref 13–61)
ANION GAP SERPL CALC-SCNC: 10 MMOL/L (ref 8–16)
ANISOCYTOSIS BLD QL: (no result)
AST SERPL-CCNC: 55 U/L (ref 15–37)
BASO STIPL BLD QL SMEAR: (no result)
BASOPHILS # BLD: 0.1 % (ref 0–2)
BILIRUB SERPL-MCNC: 0.8 MG/DL (ref 0.2–1)
BUN SERPL-MCNC: 115.3 MG/DL (ref 7–18)
CALCIUM SERPL-MCNC: 7.8 MG/DL (ref 8.5–10.1)
CHLORIDE SERPL-SCNC: 113 MMOL/L (ref 98–107)
CO2 SERPL-SCNC: 22 MMOL/L (ref 21–32)
CREAT SERPL-MCNC: 2.7 MG/DL (ref 0.55–1.3)
DEPRECATED RDW RBC AUTO: 17.3 % (ref 11.9–15.9)
EOSINOPHIL # BLD: 1.1 % (ref 0–4.5)
GLUCOSE SERPL-MCNC: 117 MG/DL (ref 74–106)
HCT VFR BLD CALC: 33.2 % (ref 35.4–49)
HGB BLD-MCNC: 10.9 GM/DL (ref 11.7–16.9)
LYMPHOCYTES # BLD: 3.5 % (ref 8–40)
MACROCYTES BLD QL: 0
MAGNESIUM SERPL-MCNC: 2 MG/DL (ref 1.8–2.4)
MCH RBC QN AUTO: 25.9 PG (ref 25.7–33.7)
MCHC RBC AUTO-ENTMCNC: 32.9 G/DL (ref 32–35.9)
MCV RBC: 78.7 FL (ref 80–96)
MONOCYTES # BLD AUTO: 3.9 % (ref 3.8–10.2)
NEUTROPHILS # BLD: 91.4 % (ref 42.8–82.8)
PLATELET # BLD AUTO: 143 K/MM3 (ref 134–434)
PLATELET BLD QL SMEAR: (no result)
PMV BLD: 8.8 FL (ref 7.5–11.1)
POTASSIUM SERPLBLD-SCNC: 3.4 MMOL/L (ref 3.5–5.1)
PROT SERPL-MCNC: 5.5 G/DL (ref 6.4–8.2)
RBC # BLD AUTO: 4.21 M/MM3 (ref 4–5.6)
SODIUM SERPL-SCNC: 145 MMOL/L (ref 136–145)
TARGETS BLD QL SMEAR: (no result)
WBC # BLD AUTO: 17.3 K/MM3 (ref 4–10)

## 2020-09-11 RX ADMIN — SODIUM CHLORIDE SCH MLS/HR: 4.5 INJECTION, SOLUTION INTRAVENOUS at 05:54

## 2020-09-11 RX ADMIN — ACETAMINOPHEN PRN MG: 500 TABLET, FILM COATED ORAL at 11:40

## 2020-09-11 RX ADMIN — POTASSIUM CHLORIDE AND SODIUM CHLORIDE SCH MLS/HR: 450; 150 INJECTION, SOLUTION INTRAVENOUS at 09:01

## 2020-09-11 RX ADMIN — DOCUSATE SODIUM SCH: 100 CAPSULE, LIQUID FILLED ORAL at 09:02

## 2020-09-11 RX ADMIN — DOCUSATE SODIUM SCH: 100 CAPSULE, LIQUID FILLED ORAL at 21:04

## 2020-09-11 RX ADMIN — PANTOPRAZOLE SODIUM SCH: 20 TABLET, DELAYED RELEASE ORAL at 06:18

## 2020-09-11 RX ADMIN — ATORVASTATIN CALCIUM SCH MG: 20 TABLET, FILM COATED ORAL at 21:05

## 2020-09-11 RX ADMIN — PIPERACILLIN SODIUM AND TAZOBACTAM SODIUM SCH MLS/HR: .25; 2 INJECTION, POWDER, LYOPHILIZED, FOR SOLUTION INTRAVENOUS at 01:03

## 2020-09-11 RX ADMIN — LEVOTHYROXINE SODIUM SCH MCG: 100 TABLET ORAL at 06:20

## 2020-09-11 RX ADMIN — FERROUS SULFATE TAB EC 324 MG (65 MG FE EQUIVALENT) SCH MG: 324 (65 FE) TABLET DELAYED RESPONSE at 09:55

## 2020-09-11 RX ADMIN — ACETAMINOPHEN PRN MG: 500 TABLET, FILM COATED ORAL at 06:20

## 2020-09-11 RX ADMIN — HEPARIN SODIUM SCH UNIT: 5000 INJECTION, SOLUTION INTRAVENOUS; SUBCUTANEOUS at 09:54

## 2020-09-11 RX ADMIN — AMPICILLIN SODIUM SCH MLS/HR: 2 INJECTION, POWDER, FOR SOLUTION INTRAMUSCULAR; INTRAVENOUS at 20:58

## 2020-09-11 RX ADMIN — TAMSULOSIN HYDROCHLORIDE SCH: 0.4 CAPSULE ORAL at 09:01

## 2020-09-11 RX ADMIN — CEFEPIME HYDROCHLORIDE SCH MLS/HR: 1 INJECTION, POWDER, FOR SOLUTION INTRAMUSCULAR; INTRAVENOUS at 22:30

## 2020-09-11 RX ADMIN — ASPIRIN 81 MG SCH MG: 81 TABLET ORAL at 09:55

## 2020-09-11 RX ADMIN — HEPARIN SODIUM SCH UNIT: 5000 INJECTION, SOLUTION INTRAVENOUS; SUBCUTANEOUS at 21:05

## 2020-09-11 RX ADMIN — Medication SCH MG: at 10:08

## 2020-09-11 RX ADMIN — PIPERACILLIN SODIUM AND TAZOBACTAM SODIUM SCH MLS/HR: .25; 2 INJECTION, POWDER, LYOPHILIZED, FOR SOLUTION INTRAVENOUS at 09:55

## 2020-09-11 NOTE — PN
Progress Note, Physician





- Current Medication List


Current Medications: 


Active Medications





Acetaminophen (Tylenol -)  1,000 mg PO Q6H PRN


   PRN Reason: PAIN


   Last Admin: 09/11/20 06:20 Dose:  1,000 mg


   Documented by: 


Albuterol Sulfate (Ventolin Hfa Inhaler -)  2 puff IH TID PRN


   PRN Reason: ASTHMA


Ascorbic Acid (Vitamin C -)  250 mg PO DAILY [facility]


   Last Admin: 09/10/20 10:30 Dose:  250 mg


   Documented by: 


Aspirin (Asa -)  81 mg PO DAILY [facility]


   Last Admin: 09/10/20 10:32 Dose:  81 mg


   Documented by: 


Atorvastatin Calcium (Lipitor -)  20 mg PO HS [facility]


   Last Admin: 09/10/20 21:53 Dose:  20 mg


   Documented by: 


Docusate Sodium (Colace -)  100 mg PO BID [facility]


   Last Admin: 09/10/20 21:52 Dose:  Not Given


   Documented by: 


Ergocalciferol (Drisdol -)  50,000 unit PO Fr@1000 [facility]


Ferrous Sulfate (Feosol -)  325 mg PO DAILY [facility]


   Last Admin: 09/10/20 10:17 Dose:  325 mg


   Documented by: 


Heparin Sodium (Porcine) (Heparin -)  5,000 unit SQ BID [facility]


   Last Admin: 09/10/20 21:53 Dose:  5,000 unit


   Documented by: 


Piperacillin Sod/Tazobactam (Sod 2.25 gm/ Dextrose)  50 mls @ 100 mls/hr IVPB 

Q8H-IV [facility]; Protocol


   Last Admin: 09/11/20 01:03 Dose:  100 mls/hr


   Documented by: 


Potassium Chloride/Sodium Chloride (1/2ns+20meq Kcl)  20 meq in 1,000 mls @ 75 

mls/hr IV ASDIR [facility]


Levothyroxine Sodium (Synthroid -)  100 mcg PO ACBK [facility]


   Last Admin: 09/11/20 06:20 Dose:  100 mcg


   Documented by: 


Pantoprazole Sodium (Protonix -)  20 mg PO ACBK [facility]


   Last Admin: 09/11/20 06:18 Dose:  Not Given


   Documented by: 


Potassium Chloride (K-Dur -)  30 meq PO ONCE ONE


   Stop: 09/11/20 08:34


Tamsulosin HCl (Flomax -)  0.4 mg PO DAILY@0830 [facility]


   Last Admin: 09/10/20 10:17 Dose:  0.4 mg


   Documented by: 











- Objective


Vital Signs: 


                                   Vital Signs











Temperature  97.6 F   09/11/20 06:00


 


Pulse Rate  65   09/11/20 06:00


 


Respiratory Rate  18   09/11/20 06:00


 


Blood Pressure  125/67   09/11/20 06:00


 


O2 Sat by Pulse Oximetry (%)  100   09/11/20 06:00











Cardiovascular: Yes: S1, S2


Respiratory: Yes: Regular, CTA Bilaterally


Gastrointestinal: Yes: Normal Bowel Sounds, Soft


Genitourinary: Yes: Swenson Present


Labs: 


                                    CBC, BMP





                                 09/11/20 07:26 





                                 09/11/20 07:26 





                                    INR, PTT











INR  0.95  (0.83-1.09)   09/07/20  22:50    














Problem List





- Problems


(1) Dysphagia


Assessment/Plan: 


swallow eval noted


dysphagia diet


Code(s): R13.10 - DYSPHAGIA, UNSPECIFIED   





(2) Weakness


Assessment/Plan: 


PT


Code(s): R53.1 - WEAKNESS   





(3) JO ANN (acute kidney injury)


Assessment/Plan: 








-Nephrology consult


-Renal U/S


-UC pending


-Cr trending down


-Continue IVF With KCL





                                        


                                Laboratory Tests











  09/08/20 09/09/20 09/10/20





  10:05 06:13 09:37


 


BUN  176.2 H*  > 150.0 H*  142.8 H*


 


Creatinine  6.6 H  5.2 H  3.7 H














  09/11/20





  07:26


 


BUN 


 


Creatinine  2.7 H








                                Laboratory Tests











  09/08/20 09/09/20 09/10/20





  10:05 06:13 09:37


 


Potassium  4.7  4.1  3.5














  09/11/20





  07:26


 


Potassium  3.4 L











Code(s): N17.9 - ACUTE KIDNEY FAILURE, UNSPECIFIED   





(4) Chronic retention of urine


Assessment/Plan: 





-Continue swenson cath





Code(s): R33.9 - RETENTION OF URINE, UNSPECIFIED   





(5) Sepsis


Assessment/Plan: 


-ID consult appreciated


-IV Zosyn


-No Lactic acidosis


-Afebrile


-Cultures:


                                  Microbiology





09/09/20 17:30   Blood - Peripheral Venous   Blood Culture - Preliminary


                            NO GROWTH OBTAINED AFTER 24 HOURS, INCUBATION TO 

CONTINUE


                            FOR 4 DAYS.


09/09/20 17:15   Blood - Peripheral Venous   Blood Culture - Preliminary


                            NO GROWTH OBTAINED AFTER 24 HOURS, INCUBATION TO 

CONTINUE


                            FOR 4 DAYS.


09/07/20 22:50   Blood - Peripheral Venous   Blood Culture - Final


                            Group D Strep Or Entero Coccus


                            Pseudomonas Species


09/07/20 22:50   Blood - Peripheral Venous   Blood Culture - Final


                            Enterococcus Faecalis


                            Pseudomonas Aeruginosa


09/08/20 00:27   Urine - Urine - Catheterized   Urine Culture - Final


                            Contaminated: Please Repeat





                                Laboratory Tests











  09/07/20 09/10/20 09/11/20





  22:50 09:37 07:26


 


WBC  34.9 H*  28.3 H  17.3 H











Code(s): A41.9 - SEPSIS, UNSPECIFIED ORGANISM

## 2020-09-11 NOTE — ECHO
Version:  1



Name:  OLE YAO                              Exam: Adult Echocardiogram

MRN:  [id number]                                       Study Date:  2020, 3:36 PM

Age:  79 Years



 ____________________________________________________________________________________________________
__________



MMode/2D Measurements & Calculations





IVSd: 1.73 cm                                          LVIDs: 3.0 cm

LVIDd: 4.8 cm

LVPWd: 1.20 cm

LAV (MOD-bp):  93.0 ml

LVOT diam: 2.05 cm                                     LA dimension: 4.9 cm



Doppler Measurements & Calculations



MR max P.8 mmHg

Ao max P.0 mmHg                                   RAY(I,D): 1.23 cm

Ao mean P.9 mmHg                                   LV V1 mean: 50.0 cm/sec

Ao V2 max: 206.1 cm/sec                                LV V1 mean P.14 mmHg

AI P1/2t: 252.4 msec

                                                       TR max grace: 225.7 cm/sec

                                                       TR max P.6 mmHg



 Left Ventricle

 There is moderate concentric left ventricular hypertrophy. Ejection Fraction = 35-40%. The transmitr
al

 spectral Doppler flow pattern is suggestive of pseudonormalization. There is moderate to severe glob
al

 hypokinesis of the left ventricle. Severe hypokinesis of the apical anterior wall and apex.

 Right Ventricle

 The right ventricle is grossly normal size. The right ventricular systolic function is grossly eduardo
l.

 Atria

 The left atrium is moderately dilated. Right atrial size is normal.

 Mitral Valve

 There is mild to moderate mitral annular calcification. No significant mitral valve stenosis. There 
is mild

 to moderate mitral regurgitation.

 Tricuspid Valve

 The tricuspid valve is normal in structure and function. There is mild tricuspid regurgitation.

 Aortic Valve

 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 The prosthetic aortic valve is well-seated. No hemodynamicall
y

 significant valvular aortic stenosis. Compared to prior study, overall findings are similar. Mild ao
rtic

 regurgitation is now noted. Mild aortic regurgitation.

 Pulmonic Valve

 The pulmonic valve is not well seen, but is grossly normal. There is no pulmonic valvular stenosis. 
There is

 no pulmonic valvular regurgitation.

 Great Vessels

 The aortic root is normal size.

 Pericardium/Pleura

 There is no pericardial effusion.

 Tech Comments

 TDS. LIMITED STUDY. Please compare with study done on 8/3/2020. Patient very thin. Scanned supine.







 Summary Statements

 Clinical correlation is recommended. Would consider a transesophageal echocardiogram if clinically i
ndicated.

 Ejection Fraction = 35-40%.

 There is moderate to severe global hypokinesis of the left ventricle.

 Severe hypokinesis of the apical anterior wall and apex.

 The left atrium is moderately dilated.

 There is mild to moderate mitral annular calcification.

 There is mild to moderate mitral regurgitation.

 There is mild tricuspid regurgitation.

 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

 The prosthetic aortic valve is well-seated.

 Mild aortic regurgitation.

 Compared to prior study, overall findings are similar. Mild aortic regurgitation is now noted.

 Clinical correlation is recommended.

 Would consider a transesophageal echocardiogram if clinically indicated.





                          MD Jackson

                       *Pippa                    2020, 7:21 PM

 Ordering Physician:  Wanda Montanez

 Referring Physician:  WANDA MONTANEZ

 Performed By:  Babs Hoang

## 2020-09-11 NOTE — PN
Progress Note (short form)





- Note


Progress Note: 





more awake 


feels lousy





                                   Vital Signs











 Period  Temp  Pulse  Resp  BP Sys/Villa  Pulse Ox


 


 Last 24 Hr  97.5 F-97.7 F  65-80  16-18  105-131/50-78  








cor-rrr


lungs clear


abd soft,nt 


+swenson


ext no edema





                                    CBC, BMP





                                 09/11/20 07:26 





                                 09/11/20 07:26 





                                  Microbiology





09/10/20 09:37   Blood - Peripheral Venous   Blood Culture - Preliminary


                            NO GROWTH OBTAINED AFTER 24 HOURS, INCUBATION TO 

CONTINUE


                            FOR 4 DAYS.


09/09/20 22:25   Urine - Urine Swenson   Urine Culture - Preliminary


                            Presumptive Ps Aeruginosa


09/09/20 17:30   Blood - Peripheral Venous   Blood Culture - Preliminary


                            NO GROWTH OBTAINED AFTER 24 HOURS, INCUBATION TO 

CONTINUE


                            FOR 4 DAYS.


09/09/20 17:15   Blood - Peripheral Venous   Blood Culture - Preliminary


                            NO GROWTH OBTAINED AFTER 24 HOURS, INCUBATION TO 

CONTINUE


                            FOR 4 DAYS.


09/07/20 22:50   Blood - Peripheral Venous   Blood Culture - Final


                            Group D Strep Or Entero Coccus


                            Pseudomonas Species


09/07/20 22:50   Blood - Peripheral Venous   Blood Culture - Final


                            Enterococcus Faecalis


                            Pseudomonas Aeruginosa


09/08/20 00:27   Urine - Urine - Catheterized   Urine Culture - Final


                            Contaminated: Please Repeat

















imp/reccd





polymicrobial bacteremia- suspect urinary source


UTI


ARF


AS with bioprosthetic aortic valve


DNR/DNI





check echo


switch to ampicillin/cefepime

## 2020-09-11 NOTE — PN.HO
Progress Note (short form)





- Note


Progress Note: 





Patient seen and examined at bedside; lethargic and not willing to participate 

in conversation stating he is tired





General: cachectic, in NAD


HEENT: NCAT, PERRL, anicteric sclera 


CVS: S1, S2, no m/r/g


Resp: anterior lung fields CTA


ABd: +BS, soft, NT, nD 


Ext: no ll edema





79y M with multiple medical comorbidities including BPH with indwelling chronic 

swenson presents with hypotension; found to have UTI with leukocytosis. Heme 

consulted for management/workup of leukocytosis





Leukocytosis: left shift with no immature cells; consistent with infectious 

process


-wbc count trending down, continue with antibiotic management

## 2020-09-11 NOTE — PN
Progress Note, Physician


History of Present Illness: 





Pt seen and examined at bedside. He is awake and alert. He denies shortness of 

breath. 





- Current Medication List


Current Medications: 


Active Medications





Acetaminophen (Tylenol -)  1,000 mg PO Q6H PRN


   PRN Reason: PAIN


   Last Admin: 09/11/20 11:40 Dose:  1,000 mg


   Documented by: 


Albuterol Sulfate (Ventolin Hfa Inhaler -)  2 puff IH TID PRN


   PRN Reason: ASTHMA


Ascorbic Acid (Vitamin C -)  250 mg PO DAILY [facility]


   Last Admin: 09/11/20 10:08 Dose:  250 mg


   Documented by: 


Aspirin (Asa -)  81 mg PO DAILY [facility]


   Last Admin: 09/11/20 09:55 Dose:  81 mg


   Documented by: 


Atorvastatin Calcium (Lipitor -)  20 mg PO HS [facility]


   Last Admin: 09/10/20 21:53 Dose:  20 mg


   Documented by: 


Docusate Sodium (Colace -)  100 mg PO BID [facility]


   Last Admin: 09/11/20 09:02 Dose:  Not Given


   Documented by: 


Ergocalciferol (Drisdol -)  50,000 unit PO Fr@1000 [facility]


   Last Admin: 09/11/20 09:04 Dose:  Not Given


   Documented by: 


Ferrous Sulfate (Feosol -)  325 mg PO DAILY [facility]


   Last Admin: 09/11/20 09:55 Dose:  325 mg


   Documented by: 


Heparin Sodium (Porcine) (Heparin -)  5,000 unit SQ BID [facility]


   Last Admin: 09/11/20 09:54 Dose:  5,000 unit


   Documented by: 


Piperacillin Sod/Tazobactam (Sod 2.25 gm/ Dextrose)  50 mls @ 100 mls/hr IVPB 

Q8H-IV [facility]; Protocol


   Last Admin: 09/11/20 09:55 Dose:  100 mls/hr


   Documented by: 


Potassium Chloride/Sodium Chloride (1/2ns+20meq Kcl)  20 meq in 1,000 mls @ 75 

mls/hr IV ASDIR [facility]


   Last Admin: 09/11/20 09:01 Dose:  75 mls/hr


   Documented by: 


Levothyroxine Sodium (Synthroid -)  100 mcg PO ACBK [facility]


   Last Admin: 09/11/20 06:20 Dose:  100 mcg


   Documented by: 


Pantoprazole Sodium (Protonix -)  20 mg PO ACBK [facility]


   Last Admin: 09/11/20 06:18 Dose:  Not Given


   Documented by: 


Tamsulosin HCl (Flomax -)  0.4 mg PO DAILY@0830 [facility]


   Last Admin: 09/11/20 09:01 Dose:  Not Given


   Documented by: 











- Objective


Vital Signs: 


                                   Vital Signs











Temperature  97.6 F   09/11/20 06:00


 


Pulse Rate  65   09/11/20 06:00


 


Respiratory Rate  18   09/11/20 06:00


 


Blood Pressure  125/67   09/11/20 06:00


 


O2 Sat by Pulse Oximetry (%)  100   09/11/20 09:00











Constitutional: Yes: Calm


Eyes: Yes: Conjunctiva Clear


HENT: Yes: Atraumatic


Neck: Yes: Supple


Cardiovascular: Yes: S1, S2


Respiratory: Yes: CTA Bilaterally


Gastrointestinal: Yes: Soft


Genitourinary: Yes: Rios Present


Musculoskeletal: Yes: WNL


Edema: No


Neurological: Yes: Confusion


Labs: 


                                    CBC, BMP





                                 09/11/20 07:26 





                                 09/11/20 07:26 





                                    INR, PTT











INR  0.95  (0.83-1.09)   09/07/20  22:50    














Problem List





- Problems


(1) JO ANN (acute kidney injury)


Code(s): N17.9 - ACUTE KIDNEY FAILURE, UNSPECIFIED   





(2) Hyperkalemia


Code(s): E87.5 - HYPERKALEMIA   





(3) Sepsis


Code(s): A41.9 - SEPSIS, UNSPECIFIED ORGANISM   





Assessment/Plan


                               Current Medications











Generic Name Dose Route Start Last Admin





  Trade Name Freq  PRN Reason Stop Dose Admin


 


Acetaminophen  1,000 mg  09/10/20 13:07  09/11/20 11:40





  Tylenol -  PO   1,000 mg





  Q6H PRN   Administration





  PAIN  


 


Albuterol Sulfate  2 puff  09/08/20 01:38 





  Ventolin Hfa Inhaler -  IH  





  TID PRN  





  ASTHMA  


 


Ascorbic Acid  250 mg  09/08/20 10:00  09/11/20 10:08





  Vitamin C -  PO   250 mg





  DAILY [facility]   Administration


 


Aspirin  81 mg  09/08/20 10:00  09/11/20 09:55





  Asa -  PO   81 mg





  DAILY [facility]   Administration


 


Atorvastatin Calcium  20 mg  09/08/20 22:00  09/10/20 21:53





  Lipitor -  PO   20 mg





  HS [facility]   Administration


 


Docusate Sodium  100 mg  09/08/20 10:00  09/11/20 09:02





  Colace -  PO   Not Given





  BID [facility]  


 


Ergocalciferol  50,000 unit  09/11/20 10:00  09/11/20 09:04





  Drisdol -  PO   Not Given





  Fr@1000 [facility]  


 


Ferrous Sulfate  325 mg  09/08/20 10:00  09/11/20 09:55





  Feosol -  PO   325 mg





  DAILY [facility]   Administration


 


Heparin Sodium (Porcine)  5,000 unit  09/08/20 10:00  09/11/20 09:54





  Heparin -  SQ   5,000 unit





  BID [facility]   Administration


 


Piperacillin Sod/Tazobactam  50 mls @ 100 mls/hr  09/08/20 18:30  09/11/20 09:55





  Sod 2.25 gm/ Dextrose  IVPB   100 mls/hr





  Q8H-IV [facility]   Administration





  Protocol  


 


Potassium Chloride/Sodium Chloride  20 meq in 1,000 mls @ 75 mls/hr  09/11/20 

08:45  09/11/20 09:01





  1/2ns+20meq Kcl  IV   75 mls/hr





  ASDIR [facility]   Administration


 


Levothyroxine Sodium  100 mcg  09/08/20 07:00  09/11/20 06:20





  Synthroid -  PO   100 mcg





  ACBK [facility]   Administration


 


Pantoprazole Sodium  20 mg  09/08/20 07:00  09/11/20 06:18





  Protonix -  PO   Not Given





  ACBK [facility]  


 


Tamsulosin HCl  0.4 mg  09/08/20 08:30  09/11/20 09:01





  Flomax -  PO   Not Given





  DAILY@0830 [facility]  











Impression


1. JO ANN


2. sepsis


3. hyperkalemia


4. hypotension


5. chf


6. bph


7. cad


8. tia


9. gerd





Plan


- renal function continues to improve


- replace potassium


- repeat labs in am


- renal function is improving


- mental status improving

## 2020-09-11 NOTE — PN
Progress Note, SLP





- Note


Progress Note: 


                                Selected Entries











  09/10/20 09/10/20 09/10/20





  02:00 05:56 10:00


 


Breakfast   


 


Diet Tolerated   


 


Supper   


 


Temperature 97.6 F 98 F 97.6 F


 


Pulse Rate 71 84 82


 


Blood Pressure 113/58 L 118/69 118/61














  09/10/20 09/10/20 09/10/20





  14:00 18:00 21:42


 


Breakfast 25%  


 


Diet Tolerated Poor Poor 


 


Supper  25% 


 


Temperature 97.9 F 97.7 F 97.5 F L


 


Pulse Rate 80 80 74


 


Blood Pressure 102/65 105/67 111/50 L














  09/11/20





  06:00


 


Breakfast 


 


Diet Tolerated 


 


Supper 


 


Temperature 97.6 F


 


Pulse Rate 65


 


Blood Pressure 125/67








                                Laboratory Tests











  09/09/20 09/10/20 09/11/20





  06:13 09:37 07:26


 


WBC  34.2 H*  28.3 H  17.3 H











MBS- Mod to severe stasis with puree. Aspiration on thin liquid





RD-- Dysphagia Puree (thinned out) nectar thick liquids per SLP recs


- TwoCal daily 


- rec MVI w/minerals





Suggest 


Pt needs pureed consistency to be thinned out to reduce stasis in pharynx


2 hard swallows per 1/2 tsp


Alternate with sip of nectar thick liquid


No thin liquids

## 2020-09-12 LAB
ALBUMIN SERPL-MCNC: 1.7 G/DL (ref 3.4–5)
ALP SERPL-CCNC: 170 U/L (ref 45–117)
ALT SERPL-CCNC: 21 U/L (ref 13–61)
ANION GAP SERPL CALC-SCNC: 6 MMOL/L (ref 8–16)
ANISOCYTOSIS BLD QL: (no result)
AST SERPL-CCNC: 54 U/L (ref 15–37)
BASOPHILS # BLD: 0.1 % (ref 0–2)
BILIRUB SERPL-MCNC: 0.6 MG/DL (ref 0.2–1)
BUN SERPL-MCNC: 76.6 MG/DL (ref 7–18)
CALCIUM SERPL-MCNC: 7.6 MG/DL (ref 8.5–10.1)
CHLORIDE SERPL-SCNC: 115 MMOL/L (ref 98–107)
CO2 SERPL-SCNC: 25 MMOL/L (ref 21–32)
CREAT SERPL-MCNC: 1.7 MG/DL (ref 0.55–1.3)
DEPRECATED RDW RBC AUTO: 17.1 % (ref 11.9–15.9)
EOSINOPHIL # BLD: 1.2 % (ref 0–4.5)
GLUCOSE SERPL-MCNC: 124 MG/DL (ref 74–106)
HCT VFR BLD CALC: 32.3 % (ref 35.4–49)
HGB BLD-MCNC: 10.5 GM/DL (ref 11.7–16.9)
LYMPHOCYTES # BLD: 3.3 % (ref 8–40)
MACROCYTES BLD QL: 0
MCH RBC QN AUTO: 25.9 PG (ref 25.7–33.7)
MCHC RBC AUTO-ENTMCNC: 32.6 G/DL (ref 32–35.9)
MCV RBC: 79.6 FL (ref 80–96)
MONOCYTES # BLD AUTO: 4.3 % (ref 3.8–10.2)
NEUTROPHILS # BLD: 91.1 % (ref 42.8–82.8)
PLATELET # BLD AUTO: 197 K/MM3 (ref 134–434)
PLATELET BLD QL SMEAR: NORMAL
PMV BLD: 8.6 FL (ref 7.5–11.1)
POTASSIUM SERPLBLD-SCNC: 4.3 MMOL/L (ref 3.5–5.1)
PROT SERPL-MCNC: 5.8 G/DL (ref 6.4–8.2)
RBC # BLD AUTO: 4.06 M/MM3 (ref 4–5.6)
SODIUM SERPL-SCNC: 146 MMOL/L (ref 136–145)
WBC # BLD AUTO: 15.8 K/MM3 (ref 4–10)

## 2020-09-12 RX ADMIN — DOCUSATE SODIUM SCH: 100 CAPSULE, LIQUID FILLED ORAL at 09:17

## 2020-09-12 RX ADMIN — POTASSIUM CHLORIDE AND SODIUM CHLORIDE SCH MLS/HR: 450; 150 INJECTION, SOLUTION INTRAVENOUS at 02:24

## 2020-09-12 RX ADMIN — DOCUSATE SODIUM SCH: 100 CAPSULE, LIQUID FILLED ORAL at 22:55

## 2020-09-12 RX ADMIN — POTASSIUM CHLORIDE AND SODIUM CHLORIDE SCH MLS/HR: 450; 150 INJECTION, SOLUTION INTRAVENOUS at 09:16

## 2020-09-12 RX ADMIN — CEFEPIME HYDROCHLORIDE SCH MLS/HR: 1 INJECTION, POWDER, FOR SOLUTION INTRAMUSCULAR; INTRAVENOUS at 09:14

## 2020-09-12 RX ADMIN — ATORVASTATIN CALCIUM SCH MG: 20 TABLET, FILM COATED ORAL at 22:53

## 2020-09-12 RX ADMIN — AMPICILLIN SODIUM SCH MLS/HR: 2 INJECTION, POWDER, FOR SOLUTION INTRAMUSCULAR; INTRAVENOUS at 14:09

## 2020-09-12 RX ADMIN — AMPICILLIN SODIUM SCH MLS/HR: 2 INJECTION, POWDER, FOR SOLUTION INTRAMUSCULAR; INTRAVENOUS at 02:24

## 2020-09-12 RX ADMIN — LEVOTHYROXINE SODIUM SCH MCG: 100 TABLET ORAL at 06:29

## 2020-09-12 RX ADMIN — CEFEPIME HYDROCHLORIDE SCH MLS/HR: 1 INJECTION, POWDER, FOR SOLUTION INTRAMUSCULAR; INTRAVENOUS at 22:54

## 2020-09-12 RX ADMIN — FERROUS SULFATE TAB EC 324 MG (65 MG FE EQUIVALENT) SCH MG: 324 (65 FE) TABLET DELAYED RESPONSE at 09:13

## 2020-09-12 RX ADMIN — AMPICILLIN SODIUM SCH MLS/HR: 2 INJECTION, POWDER, FOR SOLUTION INTRAMUSCULAR; INTRAVENOUS at 09:12

## 2020-09-12 RX ADMIN — ACETAMINOPHEN PRN MG: 500 TABLET, FILM COATED ORAL at 05:47

## 2020-09-12 RX ADMIN — Medication SCH MG: at 09:17

## 2020-09-12 RX ADMIN — HEPARIN SODIUM SCH: 5000 INJECTION, SOLUTION INTRAVENOUS; SUBCUTANEOUS at 22:54

## 2020-09-12 RX ADMIN — ASPIRIN 81 MG SCH MG: 81 TABLET ORAL at 09:13

## 2020-09-12 RX ADMIN — AMPICILLIN SODIUM SCH MLS/HR: 2 INJECTION, POWDER, FOR SOLUTION INTRAMUSCULAR; INTRAVENOUS at 22:39

## 2020-09-12 RX ADMIN — MULTIVITAMIN TABLET SCH TAB: TABLET at 11:10

## 2020-09-12 RX ADMIN — PANTOPRAZOLE SODIUM SCH: 20 TABLET, DELAYED RELEASE ORAL at 06:27

## 2020-09-12 RX ADMIN — HEPARIN SODIUM SCH UNIT: 5000 INJECTION, SOLUTION INTRAVENOUS; SUBCUTANEOUS at 09:15

## 2020-09-12 RX ADMIN — TAMSULOSIN HYDROCHLORIDE SCH: 0.4 CAPSULE ORAL at 09:16

## 2020-09-12 RX ADMIN — POTASSIUM CHLORIDE SCH MLS/HR: 149 INJECTION, SOLUTION, CONCENTRATE INTRAVENOUS at 16:54

## 2020-09-12 RX ADMIN — ACETAMINOPHEN PRN MG: 500 TABLET, FILM COATED ORAL at 18:08

## 2020-09-12 NOTE — PN
Progress Note, Physician


History of Present Illness: 





Pt seen and examined at bedside. He is awake and alert. He denies shortness of 

breath.





- Current Medication List


Current Medications: 


Active Medications





Acetaminophen (Tylenol -)  1,000 mg PO Q6H PRN


   PRN Reason: PAIN


   Last Admin: 09/12/20 05:47 Dose:  1,000 mg


   Documented by: 


Albuterol Sulfate (Ventolin Hfa Inhaler -)  2 puff IH TID PRN


   PRN Reason: ASTHMA


Ascorbic Acid (Vitamin C -)  250 mg PO DAILY [facility]


   Last Admin: 09/12/20 09:17 Dose:  250 mg


   Documented by: 


Aspirin (Asa -)  81 mg PO DAILY [facility]


   Last Admin: 09/12/20 09:13 Dose:  81 mg


   Documented by: 


Atorvastatin Calcium (Lipitor -)  20 mg PO HS [facility]


   Last Admin: 09/11/20 21:05 Dose:  20 mg


   Documented by: 


Docusate Sodium (Colace -)  100 mg PO BID [facility]


   Last Admin: 09/12/20 09:17 Dose:  Not Given


   Documented by: 


Ergocalciferol (Drisdol -)  50,000 unit PO Fr@1000 [facility]


   Last Admin: 09/11/20 09:04 Dose:  Not Given


   Documented by: 


Ferrous Sulfate (Feosol -)  325 mg PO DAILY [facility]


   Last Admin: 09/12/20 09:13 Dose:  325 mg


   Documented by: 


Heparin Sodium (Porcine) (Heparin -)  5,000 unit SQ BID [facility]


   Last Admin: 09/12/20 09:15 Dose:  5,000 unit


   Documented by: 


Potassium Chloride/Sodium Chloride (1/2ns+20meq Kcl)  20 meq in 1,000 mls @ 75 

mls/hr IV ASDIR [facility]


   Last Admin: 09/12/20 09:16 Dose:  75 mls/hr


   Documented by: 


Ampicillin Sodium 2 gm/ Sodium (Chloride)  100 mls @ 200 mls/hr IVPB Q6H-IV [facility];

Protocol


   Last Admin: 09/12/20 09:12 Dose:  200 mls/hr


   Documented by: 


Cefepime HCl 1 gm/ Dextrose  100 mls @ 100 mls/hr IVPB BID [facility]; Protocol


   Last Admin: 09/12/20 09:14 Dose:  100 mls/hr


   Documented by: 


Levothyroxine Sodium (Synthroid -)  100 mcg PO ACBK [facility]


   Last Admin: 09/12/20 06:29 Dose:  100 mcg


   Documented by: 


Multivitamins/Minerals/Vitamin C (Tab-A-Vit -)  1 tab PO DAILY [facility]


   Last Admin: 09/12/20 11:10 Dose:  1 tab


   Documented by: 


Pantoprazole Sodium (Protonix -)  20 mg PO ACBK [facility]


   Last Admin: 09/12/20 06:27 Dose:  Not Given


   Documented by: 


Tamsulosin HCl (Flomax -)  0.4 mg PO DAILY@0830 [facility]


   Last Admin: 09/12/20 09:16 Dose:  Not Given


   Documented by: 











- Objective


Vital Signs: 


                                   Vital Signs











Temperature  98.2 F   09/12/20 10:00


 


Pulse Rate  75   09/12/20 10:00


 


Respiratory Rate  18   09/12/20 10:00


 


Blood Pressure  133/75   09/12/20 10:00


 


O2 Sat by Pulse Oximetry (%)  100   09/12/20 10:00











Constitutional: Yes: Calm


Eyes: Yes: Conjunctiva Clear


HENT: Yes: Atraumatic


Neck: Yes: Supple


Cardiovascular: Yes: S1, S2


Respiratory: Yes: CTA Bilaterally


Gastrointestinal: Yes: Soft


Genitourinary: Yes: Rios Present


Musculoskeletal: Yes: Muscle Weakness


Edema: No


Integumentary: Yes: WNL


Neurological: Yes: Oriented


Labs: 


                                    CBC, BMP





                                 09/12/20 12:10 





                                 09/12/20 12:10 





                                    INR, PTT











INR  0.95  (0.83-1.09)   09/07/20  22:50    














Problem List





- Problems


(1) JO ANN (acute kidney injury)


Code(s): N17.9 - ACUTE KIDNEY FAILURE, UNSPECIFIED   





(2) Hyperkalemia


Code(s): E87.5 - HYPERKALEMIA   





(3) Sepsis


Code(s): A41.9 - SEPSIS, UNSPECIFIED ORGANISM   





Assessment/Plan


                               Current Medications











Generic Name Dose Route Start Last Admin





  Trade Name Freq  PRN Reason Stop Dose Admin


 


Acetaminophen  1,000 mg  09/10/20 13:07  09/12/20 05:47





  Tylenol -  PO   1,000 mg





  Q6H PRN   Administration





  PAIN  


 


Albuterol Sulfate  2 puff  09/08/20 01:38 





  Ventolin Hfa Inhaler -  IH  





  TID PRN  





  ASTHMA  


 


Ascorbic Acid  250 mg  09/08/20 10:00  09/12/20 09:17





  Vitamin C -  PO   250 mg





  DAILY [facility]   Administration


 


Aspirin  81 mg  09/08/20 10:00  09/12/20 09:13





  Asa -  PO   81 mg





  DAILY [facility]   Administration


 


Atorvastatin Calcium  20 mg  09/08/20 22:00  09/11/20 21:05





  Lipitor -  PO   20 mg





  HS [facility]   Administration


 


Docusate Sodium  100 mg  09/08/20 10:00  09/12/20 09:17





  Colace -  PO   Not Given





  BID [facility]  


 


Ergocalciferol  50,000 unit  09/11/20 10:00  09/11/20 09:04





  Drisdol -  PO   Not Given





  Fr@1000 [facility]  


 


Ferrous Sulfate  325 mg  09/08/20 10:00  09/12/20 09:13





  Feosol -  PO   325 mg





  DAILY [facility]   Administration


 


Heparin Sodium (Porcine)  5,000 unit  09/08/20 10:00  09/12/20 09:15





  Heparin -  SQ   5,000 unit





  BID [facility]   Administration


 


Potassium Chloride/Sodium Chloride  20 meq in 1,000 mls @ 75 mls/hr  09/11/20 

08:45  09/12/20 09:16





  1/2ns+20meq Kcl  IV   75 mls/hr





  ASDIR [facility]   Administration


 


Ampicillin Sodium 2 gm/ Sodium  100 mls @ 200 mls/hr  09/11/20 21:00  09/12/20 

09:12





  Chloride  IVPB   200 mls/hr





  Q6H-IV [facility]   Administration





  Protocol  


 


Cefepime HCl 1 gm/ Dextrose  100 mls @ 100 mls/hr  09/11/20 22:00  09/12/20 

09:14





  IVPB   100 mls/hr





  BID [facility]   Administration





  Protocol  


 


Levothyroxine Sodium  100 mcg  09/08/20 07:00  09/12/20 06:29





  Synthroid -  PO   100 mcg





  ACBK [facility]   Administration


 


Multivitamins/Minerals/Vitamin C  1 tab  09/12/20 10:15  09/12/20 11:10





  Tab-A-Vit -  PO   1 tab





  DAILY [facility]   Administration


 


Pantoprazole Sodium  20 mg  09/08/20 07:00  09/12/20 06:27





  Protonix -  PO   Not Given





  ACBK [facility]  


 


Tamsulosin HCl  0.4 mg  09/08/20 08:30  09/12/20 09:16





  Flomax -  PO   Not Given





  DAILY@0830 [facility]  














Impression


1. JO ANN


2. sepsis


3. hyperkalemia


4. hypotension


5. chf


6. bph


7. cad


8. tia


9. gerd


10. hypernatremia





Plan


- renal function is improving


- sodium rising


- change fluids to d5w with potassium


- repeat labs in am


- po intake is poor

## 2020-09-12 NOTE — PN
Progress Note, Physician


Chief Complaint: 





Sepsis


JO ANN


UTI


Bacteremia


Dysphagia


History of Present Illness: 





Mr. Lovelace is an 79 year old male BIBA from [facility] for 

hypotension (SBP in the 70's). He has a past medical history of hypertension, 

CHF, aortic stenosis s/p bioprosthetic valve replacement, CAD, prior stent, 

GERD,TIA,BPH and indwelling swenson. 





NAD


c/o fatigue


lethargic


Denies any pain or SOB


Afebrile





- Current Medication List


Current Medications: 


Active Medications





Acetaminophen (Tylenol -)  1,000 mg PO Q6H PRN


   PRN Reason: PAIN


   Last Admin: 09/12/20 05:47 Dose:  1,000 mg


   Documented by: 


Albuterol Sulfate (Ventolin Hfa Inhaler -)  2 puff IH TID PRN


   PRN Reason: ASTHMA


Ascorbic Acid (Vitamin C -)  250 mg PO DAILY [facility]


   Last Admin: 09/12/20 09:17 Dose:  250 mg


   Documented by: 


Aspirin (Asa -)  81 mg PO DAILY [facility]


   Last Admin: 09/12/20 09:13 Dose:  81 mg


   Documented by: 


Atorvastatin Calcium (Lipitor -)  20 mg PO HS [facility]


   Last Admin: 09/11/20 21:05 Dose:  20 mg


   Documented by: 


Docusate Sodium (Colace -)  100 mg PO BID [facility]


   Last Admin: 09/12/20 09:17 Dose:  Not Given


   Documented by: 


Ergocalciferol (Drisdol -)  50,000 unit PO Fr@1000 [facility]


   Last Admin: 09/11/20 09:04 Dose:  Not Given


   Documented by: 


Ferrous Sulfate (Feosol -)  325 mg PO DAILY [facility]


   Last Admin: 09/12/20 09:13 Dose:  325 mg


   Documented by: 


Heparin Sodium (Porcine) (Heparin -)  5,000 unit SQ BID [facility]


   Last Admin: 09/12/20 09:15 Dose:  5,000 unit


   Documented by: 


Potassium Chloride/Sodium Chloride (1/2ns+20meq Kcl)  20 meq in 1,000 mls @ 75 

mls/hr IV ASDIR [facility]


   Last Admin: 09/12/20 09:16 Dose:  75 mls/hr


   Documented by: 


Ampicillin Sodium 2 gm/ Sodium (Chloride)  100 mls @ 200 mls/hr IVPB Q6H-IV [facility];

Protocol


   Last Admin: 09/12/20 09:12 Dose:  200 mls/hr


   Documented by: 


Cefepime HCl 1 gm/ Dextrose  100 mls @ 100 mls/hr IVPB BID [facility]; Protocol


   Last Admin: 09/12/20 09:14 Dose:  100 mls/hr


   Documented by: 


Levothyroxine Sodium (Synthroid -)  100 mcg PO [facility]


   Last Admin: 09/12/20 06:29 Dose:  100 mcg


   Documented by: 


Pantoprazole Sodium (Protonix -)  20 mg PO [facility]


   Last Admin: 09/12/20 06:27 Dose:  Not Given


   Documented by: 


Tamsulosin HCl (Flomax -)  0.4 mg PO DAILY@0830 [facility]


   Last Admin: 09/12/20 09:16 Dose:  Not Given


   Documented by: 











- Objective


Vital Signs: 


                                   Vital Signs











Temperature  97.9 F   09/12/20 02:00


 


Pulse Rate  87   09/12/20 06:00


 


Respiratory Rate  18   09/12/20 06:00


 


Blood Pressure  114/76   09/12/20 06:00


 


O2 Sat by Pulse Oximetry (%)  99   09/12/20 06:00











Constitutional: Yes: No Distress, Calm, Cachectic


Cardiovascular: Yes: Regular Rate and Rhythm


Respiratory: Yes: Regular, CTA Bilaterally


Gastrointestinal: Yes: Normal Bowel Sounds, Soft


Genitourinary: Yes: Swenson Present


Musculoskeletal: Yes: Muscle Weakness


Extremities: Yes: WNL


Edema: No


Peripheral Pulses WNL: Yes


Neurological: Yes: Alert, Lethargy


Labs: 


                                    CBC, BMP





                                 09/11/20 07:26 





                                 09/11/20 07:26 





                                    INR, PTT











INR  0.95  (0.83-1.09)   09/07/20  22:50    














Problem List





- Problems


(1) Chronic retention of urine


Assessment/Plan: 


-Continue swenson cath


Problems reviewed: Yes   


Code(s): R33.9 - RETENTION OF URINE, UNSPECIFIED   





(2) JO ANN (acute kidney injury)


Assessment/Plan: 


-Nephrology consult


-Renal U/S


-UC possible pseudomonas


-Cr trending down


-Continue IVF


Code(s): N17.9 - ACUTE KIDNEY FAILURE, UNSPECIFIED   





(3) Hyperkalemia


Assessment/Plan: 


-resolved


Problems reviewed: Yes   


Code(s): E87.5 - HYPERKALEMIA   





(4) Sepsis


Assessment/Plan: 


-ID consult


-IV Cefepime + Ampicillin


-No Lactic acidosis


-Afebrile


-Cultures:


                                  Microbiology





09/07/20 22:50   Blood - Peripheral Venous   Blood Culture - Preliminary


                            Pending Organism


                            Pending Organism#2


09/07/20 22:50   Blood - Peripheral Venous   Blood Culture - Preliminary


                            Pending Organism


                            Pending Organism#2











Problems reviewed: Yes   


Code(s): A41.9 - SEPSIS, UNSPECIFIED ORGANISM   





(5) Failure to thrive


Assessment/Plan: 


-Add multivitamin


-Ensure pudding + Magic cup


-Seen by Speech pathology


-MBS- Mod to severe stasis with puree. Aspiration on thin liquid


-Dysphagia puree thinned out with nectar thick liquids





Problems reviewed: Yes   


Code(s): [id number] -    





(6) Malnutrition


Problems reviewed: Yes   


Code(s): E46 - UNSPECIFIED PROTEIN-CALORIE MALNUTRITION   





(7) Bacteremia


Assessment/Plan: 


-Cultures:


                                  Microbiology





09/10/20 09:37   Blood - Peripheral Venous   Blood Culture - Preliminary


                            NO GROWTH OBTAINED AFTER 48 HOURS, INCUBATION TO 

CONTINUE


                            FOR 3 DAYS.


09/09/20 17:30   Blood - Peripheral Venous   Blood Culture - Preliminary


                            NO GROWTH OBTAINED AFTER 48 HOURS, INCUBATION TO 

CONTINUE


                            FOR 3 DAYS.


09/09/20 17:15   Blood - Peripheral Venous   Blood Culture - Preliminary


                            NO GROWTH OBTAINED AFTER 48 HOURS, INCUBATION TO 

CONTINUE


                            FOR 3 DAYS.


09/09/20 22:25   Urine - Urine Swenson   Urine Culture - Preliminary


                            Presumptive Ps Aeruginosa


09/07/20 22:50   Blood - Peripheral Venous   Blood Culture - Final


                            Group D Strep Or Entero Coccus


                            Pseudomonas Species


09/07/20 22:50   Blood - Peripheral Venous   Blood Culture - Final


                            Enterococcus Faecalis


                            Pseudomonas Aeruginosa


09/08/20 00:27   Urine - Urine - Catheterized   Urine Culture - Final


                            Contaminated: Please Repeat





-rest as above


Problems reviewed: Yes   


Code(s): R78.81 - BACTEREMIA   





Assessment/Plan





See problem list

## 2020-09-12 NOTE — PN
Progress Note (short form)





- Note


Progress Note: 





Patient seen in follow up.


No new complaints today.


No events overnight.











Meds reviewed.





                              Current Medications











Generic Name Dose Route Start Last Admin





  Trade Name Freq  PRN Reason Stop Dose Admin


 


Acetaminophen  1,000 mg  09/10/20 13:07  09/12/20 05:47





  Tylenol -  PO   1,000 mg





  Q6H PRN   Administration





  PAIN  


 


Albuterol Sulfate  2 puff  09/08/20 01:38 





  Ventolin Hfa Inhaler -  IH  





  TID PRN  





  ASTHMA  


 


Ascorbic Acid  250 mg  09/08/20 10:00  09/12/20 09:17





  Vitamin C -  PO   250 mg





  DAILY [facility]   Administration


 


Aspirin  81 mg  09/08/20 10:00  09/12/20 09:13





  Asa -  PO   81 mg





  DAILY [facility]   Administration


 


Atorvastatin Calcium  20 mg  09/08/20 22:00  09/11/20 21:05





  Lipitor -  PO   20 mg





  HS [facility]   Administration


 


Docusate Sodium  100 mg  09/08/20 10:00  09/12/20 09:17





  Colace -  PO   Not Given





  BID [facility]  


 


Ergocalciferol  50,000 unit  09/11/20 10:00  09/11/20 09:04





  Drisdol -  PO   Not Given





  Fr@1000 [facility]  


 


Ferrous Sulfate  325 mg  09/08/20 10:00  09/12/20 09:13





  Feosol -  PO   325 mg





  DAILY [facility]   Administration


 


Heparin Sodium (Porcine)  5,000 unit  09/08/20 10:00  09/12/20 09:15





  Heparin -  SQ   5,000 unit





  BID [facility]   Administration


 


Ampicillin Sodium 2 gm/ Sodium  100 mls @ 200 mls/hr  09/11/20 21:00  09/12/20 

14:09





  Chloride  IVPB   200 mls/hr





  Q6H-IV [facility]   Administration





  Protocol  


 


Cefepime HCl 1 gm/ Dextrose  100 mls @ 100 mls/hr  09/11/20 22:00  09/12/20 

09:14





  IVPB   100 mls/hr





  BID [facility]   Administration





  Protocol  


 


Potassium Chloride 20 meq/  1,010 mls @ 65 mls/hr  09/12/20 14:15 





  Dextrose  IVPB  





  Q15H [facility]  


 


Levothyroxine Sodium  100 mcg  09/08/20 07:00  09/12/20 06:29





  Synthroid -  PO   100 mcg





  ACBK [facility]   Administration


 


Multivitamins/Minerals/Vitamin C  1 tab  09/12/20 10:15  09/12/20 11:10





  Tab-A-Vit -  PO   1 tab





  DAILY [facility]   Administration


 


Pantoprazole Sodium  20 mg  09/08/20 07:00  09/12/20 06:27





  Protonix -  PO   Not Given





  ACBK [facility]  


 


Tamsulosin HCl  0.4 mg  09/08/20 08:30  09/12/20 09:16





  Flomax -  PO   Not Given





  DAILY@0830 [facility]  











On exam:





                                Last Vital Signs











Temp Pulse Resp BP Pulse Ox


 


 98.2 F   75   18   133/75   100 


 


 09/12/20 10:00  09/12/20 10:00  09/12/20 10:00  09/12/20 10:00  09/12/20 10:00














General:Supine in bed


Chest: breathing comfortably


Abdomen: Soft, no organomegaly, no masses.


Neuro: Alert, oriented, non-focal.








Labs reviewed:





                                    CBC, BMP





                                 09/12/20 12:10 





                                 09/12/20 12:10 














Assessment.





Prior neutrophilia in context of UTI, now resolving.


Hematology will sign off. Please call if any questions, or new issues arise.


Microcytosis possibly attributable to a thalassemia trait - would discontinue 

oral iron supplementatiion unless iron deficiency is confirmed.

## 2020-09-13 RX ADMIN — TAMSULOSIN HYDROCHLORIDE SCH: 0.4 CAPSULE ORAL at 10:30

## 2020-09-13 RX ADMIN — Medication SCH: at 10:18

## 2020-09-13 RX ADMIN — ACETAMINOPHEN PRN MG: 500 TABLET, FILM COATED ORAL at 02:45

## 2020-09-13 RX ADMIN — POLYETHYLENE GLYCOL 3350 SCH: 17 POWDER, FOR SOLUTION ORAL at 10:19

## 2020-09-13 RX ADMIN — CEFEPIME HYDROCHLORIDE SCH: 1 INJECTION, POWDER, FOR SOLUTION INTRAMUSCULAR; INTRAVENOUS at 10:19

## 2020-09-13 RX ADMIN — AMPICILLIN SODIUM SCH MLS/HR: 2 INJECTION, POWDER, FOR SOLUTION INTRAMUSCULAR; INTRAVENOUS at 02:51

## 2020-09-13 RX ADMIN — ASPIRIN 81 MG SCH: 81 TABLET ORAL at 10:30

## 2020-09-13 RX ADMIN — HEPARIN SODIUM SCH: 5000 INJECTION, SOLUTION INTRAVENOUS; SUBCUTANEOUS at 21:21

## 2020-09-13 RX ADMIN — ACETAMINOPHEN PRN MG: 500 TABLET, FILM COATED ORAL at 16:52

## 2020-09-13 RX ADMIN — POTASSIUM CHLORIDE SCH MLS/HR: 149 INJECTION, SOLUTION, CONCENTRATE INTRAVENOUS at 06:07

## 2020-09-13 RX ADMIN — AMPICILLIN SODIUM SCH MLS/HR: 2 INJECTION, POWDER, FOR SOLUTION INTRAMUSCULAR; INTRAVENOUS at 15:47

## 2020-09-13 RX ADMIN — MULTIVITAMIN TABLET SCH: TABLET at 10:19

## 2020-09-13 RX ADMIN — FERROUS SULFATE TAB EC 324 MG (65 MG FE EQUIVALENT) SCH: 324 (65 FE) TABLET DELAYED RESPONSE at 10:23

## 2020-09-13 RX ADMIN — CEFEPIME HYDROCHLORIDE SCH MLS/HR: 1 INJECTION, POWDER, FOR SOLUTION INTRAMUSCULAR; INTRAVENOUS at 21:22

## 2020-09-13 RX ADMIN — LEVOTHYROXINE SODIUM SCH MCG: 100 TABLET ORAL at 06:14

## 2020-09-13 RX ADMIN — AMPICILLIN SODIUM SCH MLS/HR: 2 INJECTION, POWDER, FOR SOLUTION INTRAMUSCULAR; INTRAVENOUS at 12:35

## 2020-09-13 RX ADMIN — PANTOPRAZOLE SODIUM SCH: 20 TABLET, DELAYED RELEASE ORAL at 06:14

## 2020-09-13 RX ADMIN — AMPICILLIN SODIUM SCH MLS/HR: 2 INJECTION, POWDER, FOR SOLUTION INTRAMUSCULAR; INTRAVENOUS at 21:20

## 2020-09-13 RX ADMIN — ACETAMINOPHEN PRN MG: 500 TABLET, FILM COATED ORAL at 08:54

## 2020-09-13 RX ADMIN — ATORVASTATIN CALCIUM SCH MG: 20 TABLET, FILM COATED ORAL at 21:21

## 2020-09-13 RX ADMIN — HEPARIN SODIUM SCH: 5000 INJECTION, SOLUTION INTRAVENOUS; SUBCUTANEOUS at 10:23

## 2020-09-13 NOTE — PN
Progress Note, Physician


History of Present Illness: 





Pt seen and examined. he is refusing labs and refusing meds. 





- Current Medication List


Current Medications: 


Active Medications





Acetaminophen (Tylenol -)  1,000 mg PO Q6H PRN


   PRN Reason: PAIN


   Last Admin: 09/13/20 08:54 Dose:  1,000 mg


   Documented by: 


Albuterol Sulfate (Ventolin Hfa Inhaler -)  2 puff IH TID PRN


   PRN Reason: ASTHMA


Ascorbic Acid (Vitamin C -)  250 mg PO DAILY [facility]


   Last Admin: 09/13/20 10:18 Dose:  Not Given


   Documented by: 


Aspirin (Asa -)  81 mg PO DAILY [facility]


   Last Admin: 09/13/20 10:30 Dose:  Not Given


   Documented by: 


Atorvastatin Calcium (Lipitor -)  20 mg PO HS [facility]


   Last Admin: 09/12/20 22:53 Dose:  20 mg


   Documented by: 


Ferrous Sulfate (Feosol -)  325 mg PO DAILY [facility]


   Last Admin: 09/13/20 10:23 Dose:  Not Given


   Documented by: 


Heparin Sodium (Porcine) (Heparin -)  5,000 unit SQ BID [facility]


   Last Admin: 09/13/20 10:23 Dose:  Not Given


   Documented by: 


Ampicillin Sodium 2 gm/ Sodium (Chloride)  100 mls @ 200 mls/hr IVPB Q6H-IV [facility]LL[facility];

Protocol


   Last Admin: 09/13/20 02:51 Dose:  200 mls/hr


   Documented by: 


Cefepime HCl 1 gm/ Dextrose  100 mls @ 100 mls/hr IVPB BID [facility]; Protocol


   Last Admin: 09/13/20 10:19 Dose:  Not Given


   Documented by: 


Potassium Chloride 20 meq/ (Dextrose)  1,010 mls @ 65 mls/hr IVPB Q15H [facility]


   Last Admin: 09/13/20 06:07 Dose:  65 mls/hr


   Documented by: 


Levothyroxine Sodium (Synthroid -)  100 mcg PO ACBK [facility]


   Last Admin: 09/13/20 06:14 Dose:  100 mcg


   Documented by: 


Multivitamins/Minerals/Vitamin C (Tab-A-Vit -)  1 tab PO DAILY [facility]


   Last Admin: 09/13/20 10:19 Dose:  Not Given


   Documented by: 


Pantoprazole Sodium (Protonix -)  20 mg PO ACBK [facility]


   Last Admin: 09/13/20 06:14 Dose:  Not Given


   Documented by: 


Polyethylene Glycol (Miralax (For Daily Use) -)  17 gm PO DAILY [facility]


   Last Admin: 09/13/20 10:19 Dose:  Not Given


   Documented by: 


Tamsulosin HCl (Flomax -)  0.4 mg PO DAILY@0830 [facility]


   Last Admin: 09/13/20 10:30 Dose:  Not Given


   Documented by: 











- Objective


Vital Signs: 


                                   Vital Signs











Temperature  97.8 F   09/13/20 08:53


 


Pulse Rate  77   09/13/20 08:53


 


Respiratory Rate  20   09/13/20 08:53


 


Blood Pressure  150/84   09/13/20 08:53


 


O2 Sat by Pulse Oximetry (%)  98   09/13/20 08:53











Constitutional: Yes: Calm


Eyes: Yes: Conjunctiva Clear


HENT: Yes: Atraumatic


Neck: Yes: Supple


Cardiovascular: Yes: S1, S2


Respiratory: Yes: CTA Bilaterally


Gastrointestinal: Yes: Normal Bowel Sounds, Soft


Genitourinary: Yes: Incontinence


Musculoskeletal: Yes: WNL


Edema: No


Psychiatric: Yes: Agitated


Labs: 


                                    CBC, BMP





                                 09/12/20 12:10 





                                 09/12/20 12:10 





                                    INR, PTT











INR  0.95  (0.83-1.09)   09/07/20  22:50    














Problem List





- Problems


(1) JO ANN (acute kidney injury)


Code(s): N17.9 - ACUTE KIDNEY FAILURE, UNSPECIFIED   





(2) Hyperkalemia


Code(s): E87.5 - HYPERKALEMIA   





(3) Sepsis


Code(s): A41.9 - SEPSIS, UNSPECIFIED ORGANISM   





Assessment/Plan


                               Current Medications











Generic Name Dose Route Start Last Admin





  Trade Name Freq  PRN Reason Stop Dose Admin


 


Acetaminophen  1,000 mg  09/10/20 13:07  09/13/20 08:54





  Tylenol -  PO   1,000 mg





  Q6H PRN   Administration





  PAIN  


 


Albuterol Sulfate  2 puff  09/08/20 01:38 





  Ventolin Hfa Inhaler -  IH  





  TID PRN  





  ASTHMA  


 


Ascorbic Acid  250 mg  09/08/20 10:00  09/13/20 10:18





  Vitamin C -  PO   Not Given





  DAILY [facility]  


 


Aspirin  81 mg  09/08/20 10:00  09/13/20 10:30





  Asa -  PO   Not Given





  DAILY [facility]  


 


Atorvastatin Calcium  20 mg  09/08/20 22:00  09/12/20 22:53





  Lipitor -  PO   20 mg





  HS [facility]   Administration


 


Ferrous Sulfate  325 mg  09/08/20 10:00  09/13/20 10:23





  Feosol -  PO   Not Given





  DAILY [facility]  


 


Heparin Sodium (Porcine)  5,000 unit  09/08/20 10:00  09/13/20 10:23





  Heparin -  SQ   Not Given





  BID [facility]  


 


Ampicillin Sodium 2 gm/ Sodium  100 mls @ 200 mls/hr  09/11/20 21:00  09/13/20 

02:51





  Chloride  IVPB   200 mls/hr





  Q6H-IV [facility]   Administration





  Protocol  


 


Cefepime HCl 1 gm/ Dextrose  100 mls @ 100 mls/hr  09/11/20 22:00  09/13/20 

10:19





  IVPB   Not Given





  BID [facility]  





  Protocol  


 


Potassium Chloride 20 meq/  1,010 mls @ 65 mls/hr  09/12/20 14:15  09/13/20 0

6:07





  Dextrose  IVPB   65 mls/hr





  Q15H [facility]   Administration


 


Levothyroxine Sodium  100 mcg  09/08/20 07:00  09/13/20 06:14





  Synthroid -  PO   100 mcg





  ACBK [facility]   Administration


 


Multivitamins/Minerals/Vitamin C  1 tab  09/12/20 10:15  09/13/20 10:19





  Tab-A-Vit -  PO   Not Given





  DAILY [facility]  


 


Pantoprazole Sodium  20 mg  09/08/20 07:00  09/13/20 06:14





  Protonix -  PO   Not Given





  ACBK [facility]  


 


Polyethylene Glycol  17 gm  09/13/20 10:00  09/13/20 10:19





  Miralax (For Daily Use) -  PO   Not Given





  DAILY [facility]  


 


Tamsulosin HCl  0.4 mg  09/08/20 08:30  09/13/20 10:30





  Flomax -  PO   Not Given





  DAILY@0830 [facility]  











Impression


1. JO ANN


2. sepsis


3. hyperkalemia


4. hypotension


5. chf


6. bph


7. cad


8. tia


9. gerd


10. hypernatremia





Plan


- check bmp if he agrees


- cont fluids


- encourage po intake


- renal function was improving


- po intake is poor

## 2020-09-13 NOTE — PN
Progress Note, Physician


Chief Complaint: 





Sepsis


JO ANN


UTI


Bacteremia


Dysphagia


History of Present Illness: 





Mr. Lovelace is an 79 year old male BIBA from [facility] for 

hypotension (SBP in the 70's). He has a past medical history of hypertension, 

CHF, aortic stenosis s/p bioprosthetic valve replacement, CAD, prior stent, 

GERD,TIA,BPH and indwelling swenson. 





NAD


c/o fatigue


lethargic


Denies any pain or SOB


Afebrile





- Current Medication List


Current Medications: 


Active Medications





Acetaminophen (Tylenol -)  1,000 mg PO Q6H PRN


   PRN Reason: PAIN


   Last Admin: 09/13/20 08:54 Dose:  1,000 mg


   Documented by: 


Albuterol Sulfate (Ventolin Hfa Inhaler -)  2 puff IH TID PRN


   PRN Reason: ASTHMA


Ascorbic Acid (Vitamin C -)  250 mg PO DAILY [facility]


   Last Admin: 09/13/20 10:18 Dose:  Not Given


   Documented by: 


Aspirin (Asa -)  81 mg PO DAILY [facility]


   Last Admin: 09/13/20 10:30 Dose:  Not Given


   Documented by: 


Atorvastatin Calcium (Lipitor -)  20 mg PO HS [facility]


   Last Admin: 09/12/20 22:53 Dose:  20 mg


   Documented by: 


Ferrous Sulfate (Feosol -)  325 mg PO DAILY [facility]


   Last Admin: 09/13/20 10:23 Dose:  Not Given


   Documented by: 


Heparin Sodium (Porcine) (Heparin -)  5,000 unit SQ BID [facility]


   Last Admin: 09/13/20 10:23 Dose:  Not Given


   Documented by: 


Ampicillin Sodium 2 gm/ Sodium (Chloride)  100 mls @ 200 mls/hr IVPB Q6H-IV [facility];

Protocol


   Last Admin: 09/13/20 02:51 Dose:  200 mls/hr


   Documented by: 


Cefepime HCl 1 gm/ Dextrose  100 mls @ 100 mls/hr IVPB BID [facility]; Protocol


   Last Admin: 09/13/20 10:19 Dose:  Not Given


   Documented by: 


Potassium Chloride 20 meq/ (Dextrose)  1,010 mls @ 65 mls/hr IVPB Q15H [facility]


   Last Admin: 09/13/20 06:07 Dose:  65 mls/hr


   Documented by: 


Levothyroxine Sodium (Synthroid -)  100 mcg PO ACBK [facility]


   Last Admin: 09/13/20 06:14 Dose:  100 mcg


   Documented by: 


Multivitamins/Minerals/Vitamin C (Tab-A-Vit -)  1 tab PO DAILY [facility]


   Last Admin: 09/13/20 10:19 Dose:  Not Given


   Documented by: 


Pantoprazole Sodium (Protonix -)  20 mg PO ACBK [facility]


   Last Admin: 09/13/20 06:14 Dose:  Not Given


   Documented by: 


Polyethylene Glycol (Miralax (For Daily Use) -)  17 gm PO DAILY [facility]


   Last Admin: 09/13/20 10:19 Dose:  Not Given


   Documented by: 


Tamsulosin HCl (Flomax -)  0.4 mg PO DAILY@0830 [facility]


   Last Admin: 09/13/20 10:30 Dose:  Not Given


   Documented by: 











- Objective


Vital Signs: 


                                   Vital Signs











Temperature  97.8 F   09/13/20 08:53


 


Pulse Rate  77   09/13/20 08:53


 


Respiratory Rate  20   09/13/20 08:53


 


Blood Pressure  150/84   09/13/20 08:53


 


O2 Sat by Pulse Oximetry (%)  98   09/13/20 08:53











Constitutional: Yes: No Distress, Calm, Cachectic


Cardiovascular: Yes: Regular Rate and Rhythm


Respiratory: Yes: Regular, CTA Bilaterally


Gastrointestinal: Yes: Normal Bowel Sounds, Soft


Genitourinary: Yes: Swenson Present


Musculoskeletal: Yes: Muscle Weakness


Edema: No


Peripheral Pulses WNL: Yes


Neurological: Yes: Alert, Oriented


Psychiatric: Yes: Alert, Oriented


Labs: 


                                    CBC, BMP





                                 09/12/20 12:10 





                                 09/12/20 12:10 





                                    INR, PTT











INR  0.95  (0.83-1.09)   09/07/20  22:50    














Problem List





- Problems


(1) Chronic retention of urine


Assessment/Plan: 


-Continue swenson cath


Problems reviewed: Yes   


Code(s): R33.9 - RETENTION OF URINE, UNSPECIFIED   





(2) JO ANN (acute kidney injury)


Assessment/Plan: 


-Nephrology consult


-Renal U/S


-UC possible pseudomonas


-Cr trending down


-Continue IVF


Problems reviewed: Yes   


Code(s): N17.9 - ACUTE KIDNEY FAILURE, UNSPECIFIED   





(3) Hyperkalemia


Assessment/Plan: 


-resolved


Problems reviewed: Yes   


Code(s): E87.5 - HYPERKALEMIA   





(4) Sepsis


Assessment/Plan: 


-ID consult


-IV Cefepime + Ampicillin


-No Lactic acidosis


-Afebrile


-Cultures:


                                  Microbiology





09/07/20 22:50   Blood - Peripheral Venous   Blood Culture - Preliminary


                            Pending Organism


                            Pending Organism#2


09/07/20 22:50   Blood - Peripheral Venous   Blood Culture - Preliminary


                            Pending Organism


                            Pending Organism#2











Problems reviewed: Yes   


Code(s): A41.9 - SEPSIS, UNSPECIFIED ORGANISM   





(5) Failure to thrive


Assessment/Plan: 


-Add multivitamin


-Ensure pudding + Magic cup


-Seen by Speech pathology


-MBS- Mod to severe stasis with puree. Aspiration on thin liquid


-Dysphagia puree thinned out with nectar thick liquids





Problems reviewed: Yes   


Code(s): ICH7011 -    





(6) Malnutrition


Problems reviewed: Yes   


Code(s): E46 - UNSPECIFIED PROTEIN-CALORIE MALNUTRITION   





(7) Bacteremia


Assessment/Plan: 


-Cultures:


                                  Microbiology





09/10/20 09:37   Blood - Peripheral Venous   Blood Culture - Preliminary


                            NO GROWTH OBTAINED AFTER 48 HOURS, INCUBATION TO 

CONTINUE


                            FOR 3 DAYS.


09/09/20 17:30   Blood - Peripheral Venous   Blood Culture - Preliminary


                            NO GROWTH OBTAINED AFTER 48 HOURS, INCUBATION TO 

CONTINUE


                            FOR 3 DAYS.


09/09/20 17:15   Blood - Peripheral Venous   Blood Culture - Preliminary


                            NO GROWTH OBTAINED AFTER 48 HOURS, INCUBATION TO 

CONTINUE


                            FOR 3 DAYS.


09/09/20 22:25   Urine - Urine Swenson   Urine Culture - Preliminary


                            Presumptive Ps Aeruginosa


09/07/20 22:50   Blood - Peripheral Venous   Blood Culture - Final


                            Group D Strep Or Entero Coccus


                            Pseudomonas Species


09/07/20 22:50   Blood - Peripheral Venous   Blood Culture - Final


                            Enterococcus Faecalis


                            Pseudomonas Aeruginosa


09/08/20 00:27   Urine - Urine - Catheterized   Urine Culture - Final


                            Contaminated: Please Repeat





-rest as above


Problems reviewed: Yes   


Code(s): R78.81 - BACTEREMIA   





Assessment/Plan





See problem list

## 2020-09-14 LAB
ALBUMIN SERPL-MCNC: 1.6 G/DL (ref 3.4–5)
ALP SERPL-CCNC: 191 U/L (ref 45–117)
ALT SERPL-CCNC: 23 U/L (ref 13–61)
ANION GAP SERPL CALC-SCNC: 5 MMOL/L (ref 8–16)
AST SERPL-CCNC: 47 U/L (ref 15–37)
BASOPHILS # BLD: 0.6 % (ref 0–2)
BILIRUB SERPL-MCNC: 0.6 MG/DL (ref 0.2–1)
BUN SERPL-MCNC: 37.6 MG/DL (ref 7–18)
CALCIUM SERPL-MCNC: 7.6 MG/DL (ref 8.5–10.1)
CHLORIDE SERPL-SCNC: 112 MMOL/L (ref 98–107)
CO2 SERPL-SCNC: 23 MMOL/L (ref 21–32)
CREAT SERPL-MCNC: 1.1 MG/DL (ref 0.55–1.3)
DEPRECATED RDW RBC AUTO: 17.4 % (ref 11.9–15.9)
EOSINOPHIL # BLD: 1 % (ref 0–4.5)
GLUCOSE SERPL-MCNC: 89 MG/DL (ref 74–106)
HCT VFR BLD CALC: 33.3 % (ref 35.4–49)
HGB BLD-MCNC: 10.5 GM/DL (ref 11.7–16.9)
LYMPHOCYTES # BLD: 3.8 % (ref 8–40)
MCH RBC QN AUTO: 25 PG (ref 25.7–33.7)
MCHC RBC AUTO-ENTMCNC: 31.4 G/DL (ref 32–35.9)
MCV RBC: 79.5 FL (ref 80–96)
MONOCYTES # BLD AUTO: 3.1 % (ref 3.8–10.2)
NEUTROPHILS # BLD: 91.5 % (ref 42.8–82.8)
PLATELET # BLD AUTO: 284 K/MM3 (ref 134–434)
PMV BLD: 8.6 FL (ref 7.5–11.1)
POTASSIUM SERPLBLD-SCNC: 5 MMOL/L (ref 3.5–5.1)
PROT SERPL-MCNC: 6.6 G/DL (ref 6.4–8.2)
RBC # BLD AUTO: 4.18 M/MM3 (ref 4–5.6)
SODIUM SERPL-SCNC: 140 MMOL/L (ref 136–145)
WBC # BLD AUTO: 17.2 K/MM3 (ref 4–10)

## 2020-09-14 RX ADMIN — POLYETHYLENE GLYCOL 3350 SCH GRAMS: 17 POWDER, FOR SOLUTION ORAL at 11:32

## 2020-09-14 RX ADMIN — CEFEPIME HYDROCHLORIDE SCH MLS/HR: 2 INJECTION, POWDER, FOR SOLUTION INTRAVENOUS at 19:23

## 2020-09-14 RX ADMIN — Medication SCH MG: at 11:32

## 2020-09-14 RX ADMIN — ACETAMINOPHEN PRN MG: 500 TABLET, FILM COATED ORAL at 05:06

## 2020-09-14 RX ADMIN — ACETAMINOPHEN PRN MG: 500 TABLET, FILM COATED ORAL at 11:38

## 2020-09-14 RX ADMIN — HEPARIN SODIUM SCH UNIT: 5000 INJECTION, SOLUTION INTRAVENOUS; SUBCUTANEOUS at 11:34

## 2020-09-14 RX ADMIN — LEVOTHYROXINE SODIUM SCH MCG: 100 TABLET ORAL at 06:24

## 2020-09-14 RX ADMIN — AMPICILLIN SODIUM SCH MLS/HR: 2 INJECTION, POWDER, FOR SOLUTION INTRAMUSCULAR; INTRAVENOUS at 22:07

## 2020-09-14 RX ADMIN — TAMSULOSIN HYDROCHLORIDE SCH MG: 0.4 CAPSULE ORAL at 11:33

## 2020-09-14 RX ADMIN — AMPICILLIN SODIUM SCH MLS/HR: 2 INJECTION, POWDER, FOR SOLUTION INTRAMUSCULAR; INTRAVENOUS at 03:09

## 2020-09-14 RX ADMIN — HEPARIN SODIUM SCH: 5000 INJECTION, SOLUTION INTRAVENOUS; SUBCUTANEOUS at 12:15

## 2020-09-14 RX ADMIN — CEFEPIME HYDROCHLORIDE SCH: 1 INJECTION, POWDER, FOR SOLUTION INTRAMUSCULAR; INTRAVENOUS at 14:06

## 2020-09-14 RX ADMIN — MULTIVITAMIN TABLET SCH TAB: TABLET at 11:32

## 2020-09-14 RX ADMIN — ATORVASTATIN CALCIUM SCH: 20 TABLET, FILM COATED ORAL at 22:10

## 2020-09-14 RX ADMIN — HEPARIN SODIUM SCH: 5000 INJECTION, SOLUTION INTRAVENOUS; SUBCUTANEOUS at 22:10

## 2020-09-14 RX ADMIN — POTASSIUM CHLORIDE SCH MLS/HR: 149 INJECTION, SOLUTION, CONCENTRATE INTRAVENOUS at 02:53

## 2020-09-14 RX ADMIN — PANTOPRAZOLE SODIUM SCH: 20 TABLET, DELAYED RELEASE ORAL at 06:24

## 2020-09-14 RX ADMIN — FERROUS SULFATE TAB EC 324 MG (65 MG FE EQUIVALENT) SCH MG: 324 (65 FE) TABLET DELAYED RESPONSE at 11:33

## 2020-09-14 RX ADMIN — CEFEPIME HYDROCHLORIDE SCH MLS/HR: 2 INJECTION, POWDER, FOR SOLUTION INTRAVENOUS at 14:37

## 2020-09-14 RX ADMIN — POTASSIUM CHLORIDE SCH: 149 INJECTION, SOLUTION, CONCENTRATE INTRAVENOUS at 14:06

## 2020-09-14 RX ADMIN — ASPIRIN 81 MG SCH MG: 81 TABLET ORAL at 11:33

## 2020-09-14 RX ADMIN — AMPICILLIN SODIUM SCH MLS/HR: 2 INJECTION, POWDER, FOR SOLUTION INTRAMUSCULAR; INTRAVENOUS at 12:14

## 2020-09-14 RX ADMIN — AMPICILLIN SODIUM SCH MLS/HR: 2 INJECTION, POWDER, FOR SOLUTION INTRAMUSCULAR; INTRAVENOUS at 18:21

## 2020-09-14 RX ADMIN — ACETAMINOPHEN PRN MG: 500 TABLET, FILM COATED ORAL at 19:22

## 2020-09-14 NOTE — PN
Progress Note, Physician


History of Present Illness: 





Pt seen and examined at bedside. He is awake and more interactive. He denies 

shortness of breath. 





- Current Medication List


Current Medications: 


Active Medications





Acetaminophen (Tylenol -)  1,000 mg PO Q6H PRN


   PRN Reason: PAIN


   Last Admin: 09/14/20 11:38 Dose:  1,000 mg


   Documented by: 


Albuterol Sulfate (Ventolin Hfa Inhaler -)  2 puff IH TID PRN


   PRN Reason: ASTHMA


Ascorbic Acid (Vitamin C -)  250 mg PO DAILY [facility]


   Last Admin: 09/14/20 11:32 Dose:  250 mg


   Documented by: 


Aspirin (Asa -)  81 mg PO DAILY [facility]


   Last Admin: 09/14/20 11:33 Dose:  81 mg


   Documented by: 


Atorvastatin Calcium (Lipitor -)  20 mg PO HS [facility]


   Last Admin: 09/13/20 21:21 Dose:  20 mg


   Documented by: 


Ferrous Sulfate (Feosol -)  325 mg PO DAILY [facility]


   Last Admin: 09/14/20 11:33 Dose:  325 mg


   Documented by: 


Heparin Sodium (Porcine) (Heparin -)  5,000 unit SQ BID [facility]


   Last Admin: 09/14/20 12:15 Dose:  Not Given


   Documented by: 


Potassium Chloride 20 meq/ (Dextrose)  1,010 mls @ 65 mls/hr IVPB Q15H [facility]


   Last Admin: 09/14/20 02:53 Dose:  65 mls/hr


   Documented by: 


Ampicillin Sodium 2 gm/ Sodium (Chloride)  100 mls @ 200 mls/hr IVPB Q4H-IV [facility];

Protocol


Cefepime HCl 2 gm/ Dextrose  100 mls @ 200 mls/hr IVPB Q8H-IV [facility]; Protocol


Levothyroxine Sodium (Synthroid -)  100 mcg PO ACBK [facility]


   Last Admin: 09/14/20 06:24 Dose:  100 mcg


   Documented by: 


Multivitamins/Minerals/Vitamin C (Tab-A-Vit -)  1 tab PO DAILY [facility]


   Last Admin: 09/14/20 11:32 Dose:  1 tab


   Documented by: 


Pantoprazole Sodium (Protonix -)  20 mg PO ACBK [facility]


   Last Admin: 09/14/20 06:24 Dose:  Not Given


   Documented by: 


Polyethylene Glycol (Miralax (For Daily Use) -)  17 gm PO DAILY [facility]


   Last Admin: 09/14/20 11:32 Dose:  17 grams


   Documented by: 


Tamsulosin HCl (Flomax -)  0.4 mg PO DAILY@0830 [facility]


   Last Admin: 09/14/20 11:33 Dose:  0.4 mg


   Documented by: 











- Objective


Vital Signs: 


                                   Vital Signs











Temperature  97.6 F   09/14/20 05:12


 


Pulse Rate  88   09/14/20 05:12


 


Respiratory Rate  18   09/14/20 05:12


 


Blood Pressure  137/82   09/14/20 05:12


 


O2 Sat by Pulse Oximetry (%)  98   09/14/20 05:12











Constitutional: Yes: Calm


Eyes: Yes: Conjunctiva Clear


HENT: Yes: Atraumatic


Neck: Yes: Supple


Cardiovascular: Yes: S1, S2


Respiratory: Yes: CTA Bilaterally


Gastrointestinal: Yes: Soft


Genitourinary: Yes: WNL


Musculoskeletal: Yes: WNL


Edema: No


Integumentary: Yes: WNL


Neurological: Yes: Oriented


Psychiatric: Yes: Oriented


Labs: 


                                    CBC, BMP





                                 09/14/20 07:33 





                                 09/14/20 07:33 





                                    INR, PTT











INR  0.95  (0.83-1.09)   09/07/20  22:50    














Problem List





- Problems


(1) JO ANN (acute kidney injury)


Code(s): N17.9 - ACUTE KIDNEY FAILURE, UNSPECIFIED   





(2) Hyperkalemia


Code(s): E87.5 - HYPERKALEMIA   





(3) Sepsis


Code(s): A41.9 - SEPSIS, UNSPECIFIED ORGANISM   





Assessment/Plan


                               Current Medications











Generic Name Dose Route Start Last Admin





  Trade Name Freq  PRN Reason Stop Dose Admin


 


Acetaminophen  1,000 mg  09/10/20 13:07  09/14/20 11:38





  Tylenol -  PO   1,000 mg





  Q6H PRN   Administration





  PAIN  


 


Albuterol Sulfate  2 puff  09/08/20 01:38 





  Ventolin Hfa Inhaler -  IH  





  TID PRN  





  ASTHMA  


 


Ascorbic Acid  250 mg  09/08/20 10:00  09/14/20 11:32





  Vitamin C -  PO   250 mg





  DAILY [facility]   Administration


 


Aspirin  81 mg  09/08/20 10:00  09/14/20 11:33





  Asa -  PO   81 mg





  DAILY [facility]   Administration


 


Atorvastatin Calcium  20 mg  09/08/20 22:00  09/13/20 21:21





  Lipitor -  PO   20 mg





  HS [facility]   Administration


 


Ferrous Sulfate  325 mg  09/08/20 10:00  09/14/20 11:33





  Feosol -  PO   325 mg





  DAILY [facility]   Administration


 


Heparin Sodium (Porcine)  5,000 unit  09/08/20 10:00  09/14/20 12:15





  Heparin -  SQ   Not Given





  BID [facility]  


 


Potassium Chloride 20 meq/  1,010 mls @ 65 mls/hr  09/12/20 14:15  09/14/20 

02:53





  Dextrose  IVPB   65 mls/hr





  Q15H [facility]   Administration


 


Ampicillin Sodium 2 gm/ Sodium  100 mls @ 200 mls/hr  09/14/20 18:00 





  Chloride  IVPB  





  Q4H-IV [facility]  





  Protocol  


 


Cefepime HCl 2 gm/ Dextrose  100 mls @ 200 mls/hr  09/14/20 14:00 





  IVPB  





  Q8H-IV [facility]  





  Protocol  


 


Levothyroxine Sodium  100 mcg  09/08/20 07:00  09/14/20 06:24





  Synthroid -  PO   100 mcg





  ACBK [facility]   Administration


 


Multivitamins/Minerals/Vitamin C  1 tab  09/12/20 10:15  09/14/20 11:32





  Tab-A-Vit -  PO   1 tab





  DAILY [facility]   Administration


 


Pantoprazole Sodium  20 mg  09/08/20 07:00  09/14/20 06:24





  Protonix -  PO   Not Given





  ACBK [facility]  


 


Polyethylene Glycol  17 gm  09/13/20 10:00  09/14/20 11:32





  Miralax (For Daily Use) -  PO   17 grams





  DAILY [facility]   Administration


 


Tamsulosin HCl  0.4 mg  09/08/20 08:30  09/14/20 11:33





  Flomax -  PO   0.4 mg





  DAILY@0830 [facility]   Administration














Impression


1. JO ANN


2. sepsis


3. hyperkalemia


4. hypotension


5. chf


6. bph


7. cad


8. tia


9. gerd


10. hypernatremia





Plan


- change fluids to 1/2 ns and decrease rate


- repeat labs in the am


- encourage po intake


- renal function improved


- po intake is poor

## 2020-09-14 NOTE — PN
Progress Note, Physician


Chief Complaint: 





ASLEEP


EVENTS AND NOTES REVIEWED





- Current Medication List


Current Medications: 


Active Medications





Acetaminophen (Tylenol -)  1,000 mg PO Q6H PRN


   PRN Reason: PAIN


   Last Admin: 09/14/20 05:06 Dose:  1,000 mg


   Documented by: 


Albuterol Sulfate (Ventolin Hfa Inhaler -)  2 puff IH TID PRN


   PRN Reason: ASTHMA


Ascorbic Acid (Vitamin C -)  250 mg PO DAILY [facility]


   Last Admin: 09/13/20 10:18 Dose:  Not Given


   Documented by: 


Aspirin (Asa -)  81 mg PO DAILY [facility]


   Last Admin: 09/13/20 10:30 Dose:  Not Given


   Documented by: 


Atorvastatin Calcium (Lipitor -)  20 mg PO HS [facility]


   Last Admin: 09/13/20 21:21 Dose:  20 mg


   Documented by: 


Ferrous Sulfate (Feosol -)  325 mg PO DAILY [facility]


   Last Admin: 09/13/20 10:23 Dose:  Not Given


   Documented by: 


Heparin Sodium (Porcine) (Heparin -)  5,000 unit SQ BID [facility]


   Last Admin: 09/13/20 21:21 Dose:  Not Given


   Documented by: 


Ampicillin Sodium 2 gm/ Sodium (Chloride)  100 mls @ 200 mls/hr IVPB Q6H-IV [facility];

Protocol


   Last Admin: 09/14/20 03:09 Dose:  200 mls/hr


   Documented by: 


Cefepime HCl 1 gm/ Dextrose  100 mls @ 100 mls/hr IVPB BID [facility]; Protocol


   Last Admin: 09/13/20 21:22 Dose:  100 mls/hr


   Documented by: 


Potassium Chloride 20 meq/ (Dextrose)  1,010 mls @ 65 mls/hr IVPB Q15H [facility]


   Last Admin: 09/14/20 02:53 Dose:  65 mls/hr


   Documented by: 


Levothyroxine Sodium (Synthroid -)  100 mcg PO ACBK [facility]


   Last Admin: 09/14/20 06:24 Dose:  100 mcg


   Documented by: 


Multivitamins/Minerals/Vitamin C (Tab-A-Vit -)  1 tab PO DAILY [facility]


   Last Admin: 09/13/20 10:19 Dose:  Not Given


   Documented by: 


Pantoprazole Sodium (Protonix -)  20 mg PO ACBK [facility]


   Last Admin: 09/14/20 06:24 Dose:  Not Given


   Documented by: 


Polyethylene Glycol (Miralax (For Daily Use) -)  17 gm PO DAILY [facility]


   Last Admin: 09/13/20 10:19 Dose:  Not Given


   Documented by: 


Tamsulosin HCl (Flomax -)  0.4 mg PO DAILY@0830 [facility]


   Last Admin: 09/13/20 10:30 Dose:  Not Given


   Documented by: 











- Objective


Vital Signs: 


                                   Vital Signs











Temperature  97.6 F   09/14/20 05:12


 


Pulse Rate  88   09/14/20 05:12


 


Respiratory Rate  18   09/14/20 05:12


 


Blood Pressure  137/82   09/14/20 05:12


 


O2 Sat by Pulse Oximetry (%)  98   09/14/20 05:12











Constitutional: Yes: Mild Distress


Cardiovascular: Yes: Regular Rate and Rhythm


Respiratory: Yes: Diminished


Gastrointestinal: Yes: Soft


Musculoskeletal: Yes: Muscle Weakness


Neurological: Yes: Pre-Existing Deficit


Labs: 


                                    CBC, BMP





                                 09/12/20 12:10 





                                 09/12/20 12:10 





                                    INR, PTT











INR  0.95  (0.83-1.09)   09/07/20  22:50    














Problem List





- Problems


(1) JO ANN (acute kidney injury)


Code(s): N17.9 - ACUTE KIDNEY FAILURE, UNSPECIFIED   





(2) Bacteremia


Code(s): R78.81 - BACTEREMIA   





(3) Chronic retention of urine


Code(s): R33.9 - RETENTION OF URINE, UNSPECIFIED   





(4) Dysphagia


Code(s): R13.10 - DYSPHAGIA, UNSPECIFIED   





(5) Failure to thrive


Code(s): [id number] -    





(6) Hyperkalemia


Code(s): E87.5 - HYPERKALEMIA   





(7) Malnutrition


Code(s): E46 - UNSPECIFIED PROTEIN-CALORIE MALNUTRITION   





(8) Sepsis


Code(s): A41.9 - SEPSIS, UNSPECIFIED ORGANISM   





(9) Weakness


Code(s): R53.1 - WEAKNESS   





Assessment/Plan





IV ABX PER ID


F/U CULTURES


CHECK LABS REPLETE ELECTROLYTES


DVT PROPHYLAXIS


PT EVAL


NUTRITION EVAL WITH SPEECH AND SWALLOW EVAL

## 2020-09-14 NOTE — PN
Progress Note (short form)





- Note


Progress Note: 





continues to feel lousy





                                   Vital Signs











 Period  Temp  Pulse  Resp  BP Sys/Villa  Pulse Ox


 


 Last 24 Hr  97.3 F-98.3 F  80-88  18-20  121-137/68-92  








cor-rrr


lungs clear


ab d soft,no distention


+swenson


ext no edema





                                    CBC, BMP





                                 09/14/20 07:33 





                                 09/14/20 07:33 





                                  Microbiology





09/10/20 09:37   Blood - Peripheral Venous   Blood Culture - Preliminary


                            NO GROWTH OBTAINED AFTER 96 HOURS, INCUBATION TO 

CONTINUE


                            FOR 1 DAYS.


09/09/20 17:30   Blood - Peripheral Venous   Blood Culture - Preliminary


                            NO GROWTH OBTAINED AFTER 96 HOURS, INCUBATION TO 

CONTINUE


                            FOR 1 DAYS.


09/09/20 17:15   Blood - Peripheral Venous   Blood Culture - Preliminary


                            NO GROWTH OBTAINED AFTER 96 HOURS, INCUBATION TO 

CONTINUE


                            FOR 1 DAYS.


09/09/20 22:25   Urine - Urine Swenson   Urine Culture - Final


                            Pseudomonas Aeruginosa


09/07/20 22:50   Blood - Peripheral Venous   Blood Culture - Final


                            Group D Strep Or Entero Coccus


                            Pseudomonas Species


09/07/20 22:50   Blood - Peripheral Venous   Blood Culture - Final


                            Enterococcus Faecalis


                            Pseudomonas Aeruginosa


09/08/20 00:27   Urine - Urine - Catheterized   Urine Culture - Final


                            Contaminated: Please Repeat




















imp/reccd


sepsis


polymicrobial bacteremia- suspect urinary source


UTI


ARF-resolved


BPH with chronic swenson 


AS with bioprosthetic aortic valve


DNR/DNI





clinically improved











adjust antibiotics for improved renal function





Problem List





- Problems


(1) Sepsis


Code(s): A41.9 - SEPSIS, UNSPECIFIED ORGANISM   





(2) Bacteremia


Code(s): R78.81 - BACTEREMIA   





(3) JO ANN (acute kidney injury)


Code(s): N17.9 - ACUTE KIDNEY FAILURE, UNSPECIFIED   





(4) UTI (urinary tract infection)


Code(s): N39.0 - URINARY TRACT INFECTION, SITE NOT SPECIFIED   





(5) Chronic retention of urine


Code(s): R33.9 - RETENTION OF URINE, UNSPECIFIED   





(6) H/O heart valve replacement with bioprosthetic valve


Code(s): Z95.3 - PRESENCE OF XENOGENIC HEART VALVE

## 2020-09-15 LAB
ALBUMIN SERPL-MCNC: 1.5 G/DL (ref 3.4–5)
ALP SERPL-CCNC: 173 U/L (ref 45–117)
ALT SERPL-CCNC: 18 U/L (ref 13–61)
ANION GAP SERPL CALC-SCNC: 5 MMOL/L (ref 8–16)
AST SERPL-CCNC: 45 U/L (ref 15–37)
BASOPHILS # BLD: 0.3 % (ref 0–2)
BILIRUB SERPL-MCNC: 0.6 MG/DL (ref 0.2–1)
BUN SERPL-MCNC: 35 MG/DL (ref 7–18)
CALCIUM SERPL-MCNC: 7.2 MG/DL (ref 8.5–10.1)
CHLORIDE SERPL-SCNC: 109 MMOL/L (ref 98–107)
CO2 SERPL-SCNC: 24 MMOL/L (ref 21–32)
CREAT SERPL-MCNC: 1.2 MG/DL (ref 0.55–1.3)
DEPRECATED RDW RBC AUTO: 17.4 % (ref 11.9–15.9)
EOSINOPHIL # BLD: 1.1 % (ref 0–4.5)
GLUCOSE SERPL-MCNC: 70 MG/DL (ref 74–106)
HCT VFR BLD CALC: 31.5 % (ref 35.4–49)
HGB BLD-MCNC: 10.2 GM/DL (ref 11.7–16.9)
LYMPHOCYTES # BLD: 5.1 % (ref 8–40)
MCH RBC QN AUTO: 26.1 PG (ref 25.7–33.7)
MCHC RBC AUTO-ENTMCNC: 32.2 G/DL (ref 32–35.9)
MCV RBC: 81.1 FL (ref 80–96)
MONOCYTES # BLD AUTO: 5.1 % (ref 3.8–10.2)
NEUTROPHILS # BLD: 88.4 % (ref 42.8–82.8)
PLATELET # BLD AUTO: 272 K/MM3 (ref 134–434)
PMV BLD: 8.6 FL (ref 7.5–11.1)
POTASSIUM SERPLBLD-SCNC: 5.3 MMOL/L (ref 3.5–5.1)
PROT SERPL-MCNC: 5.4 G/DL (ref 6.4–8.2)
RBC # BLD AUTO: 3.89 M/MM3 (ref 4–5.6)
SODIUM SERPL-SCNC: 137 MMOL/L (ref 136–145)
WBC # BLD AUTO: 15.3 K/MM3 (ref 4–10)

## 2020-09-15 RX ADMIN — POLYETHYLENE GLYCOL 3350 SCH: 17 POWDER, FOR SOLUTION ORAL at 11:46

## 2020-09-15 RX ADMIN — PANTOPRAZOLE SODIUM SCH: 20 TABLET, DELAYED RELEASE ORAL at 06:41

## 2020-09-15 RX ADMIN — MULTIVITAMIN TABLET SCH TAB: TABLET at 11:37

## 2020-09-15 RX ADMIN — FERROUS SULFATE TAB EC 324 MG (65 MG FE EQUIVALENT) SCH MG: 324 (65 FE) TABLET DELAYED RESPONSE at 11:37

## 2020-09-15 RX ADMIN — AMPICILLIN SODIUM SCH MLS/HR: 2 INJECTION, POWDER, FOR SOLUTION INTRAMUSCULAR; INTRAVENOUS at 06:41

## 2020-09-15 RX ADMIN — ATORVASTATIN CALCIUM SCH MG: 20 TABLET, FILM COATED ORAL at 21:21

## 2020-09-15 RX ADMIN — SENNOSIDES SCH: 8.6 TABLET, FILM COATED ORAL at 21:36

## 2020-09-15 RX ADMIN — AMPICILLIN SODIUM SCH MLS/HR: 2 INJECTION, POWDER, FOR SOLUTION INTRAMUSCULAR; INTRAVENOUS at 18:28

## 2020-09-15 RX ADMIN — Medication SCH MG: at 11:38

## 2020-09-15 RX ADMIN — CEFEPIME HYDROCHLORIDE SCH MLS/HR: 2 INJECTION, POWDER, FOR SOLUTION INTRAVENOUS at 01:50

## 2020-09-15 RX ADMIN — TAMSULOSIN HYDROCHLORIDE SCH MG: 0.4 CAPSULE ORAL at 08:38

## 2020-09-15 RX ADMIN — LEVOTHYROXINE SODIUM SCH MCG: 100 TABLET ORAL at 06:45

## 2020-09-15 RX ADMIN — CEFEPIME HYDROCHLORIDE SCH MLS/HR: 2 INJECTION, POWDER, FOR SOLUTION INTRAVENOUS at 18:13

## 2020-09-15 RX ADMIN — SENNOSIDES SCH TAB: 8.6 TABLET, FILM COATED ORAL at 21:21

## 2020-09-15 RX ADMIN — AMPICILLIN SODIUM SCH MLS/HR: 2 INJECTION, POWDER, FOR SOLUTION INTRAMUSCULAR; INTRAVENOUS at 21:20

## 2020-09-15 RX ADMIN — AMPICILLIN SODIUM SCH MLS/HR: 2 INJECTION, POWDER, FOR SOLUTION INTRAMUSCULAR; INTRAVENOUS at 09:19

## 2020-09-15 RX ADMIN — AMPICILLIN SODIUM SCH MLS/HR: 2 INJECTION, POWDER, FOR SOLUTION INTRAMUSCULAR; INTRAVENOUS at 13:31

## 2020-09-15 RX ADMIN — AMPICILLIN SODIUM SCH MLS/HR: 2 INJECTION, POWDER, FOR SOLUTION INTRAMUSCULAR; INTRAVENOUS at 02:39

## 2020-09-15 RX ADMIN — AMPICILLIN SODIUM SCH MLS/HR: 2 INJECTION, POWDER, FOR SOLUTION INTRAMUSCULAR; INTRAVENOUS at 19:05

## 2020-09-15 RX ADMIN — CEFEPIME HYDROCHLORIDE SCH MLS/HR: 2 INJECTION, POWDER, FOR SOLUTION INTRAVENOUS at 11:37

## 2020-09-15 RX ADMIN — ACETAMINOPHEN PRN MG: 500 TABLET, FILM COATED ORAL at 08:39

## 2020-09-15 RX ADMIN — ASPIRIN 81 MG SCH MG: 81 TABLET ORAL at 11:37

## 2020-09-15 RX ADMIN — ACETAMINOPHEN PRN MG: 500 TABLET, FILM COATED ORAL at 01:49

## 2020-09-15 RX ADMIN — FAMOTIDINE SCH MG: 20 TABLET ORAL at 11:38

## 2020-09-15 NOTE — PN
Progress Note (short form)





- Note


Progress Note: 





continues to feel lousy





                                  Vital Signs











 Period  Temp  Pulse  Resp  BP Sys/Villa  Pulse Ox


 


 Last 24 Hr  97.6 F-98.2 F  79-90  18-20  115-134/63-76  97-98








cor-rrr


lungs clear


abd soft,mild lower abdominal pain to palpation


ext no edema


+swenson





                                    CBC, BMP





                                 09/15/20 08:05 





                                 09/15/20 08:05 





                                  Microbiology





09/10/20 09:37   Blood - Peripheral Venous   Blood Culture - Final


                            NO GROWTH AFTER 5 DAYS INCUBATION


09/09/20 17:15   Blood - Peripheral Venous   Blood Culture - Final


                            NO GROWTH AFTER 5 DAYS INCUBATION


09/09/20 17:30   Blood - Peripheral Venous   Blood Culture - Final


                            NO GROWTH AFTER 5 DAYS INCUBATION


09/09/20 22:25   Urine - Urine Swenson   Urine Culture - Final


                            Pseudomonas Aeruginosa


09/07/20 22:50   Blood - Peripheral Venous   Blood Culture - Final


                            Group D Strep Or Entero Coccus


                            Pseudomonas Species


09/07/20 22:50   Blood - Peripheral Venous   Blood Culture - Final


                            Enterococcus Faecalis


                            Pseudomonas Aeruginosa


09/08/20 00:27   Urine - Urine - Catheterized   Urine Culture - Final


                            Contaminated: Please Repeat




















imp/reccd





polymicrobial bacteremia- suspect urinary source


UTI


ARF-resolved


BPH with chronic swenson 


AS with bioprosthetic aortic valve


DNR/DNI





clinically improved











continue amp/cefepime day #8 antibiotics


consider ct scan abd/pelvis if leukocytosis persists

## 2020-09-15 NOTE — PN
Progress Note, SLP





- Note


Progress Note: 





                                Selected Entries











  09/14/20 09/14/20 09/14/20





  02:00 05:12 09:00


 


Breakfast   


 


Lunch   


 


Supper   


 


Temperature   


 


Pulse Rate   


 


Blood Pressure   


 


O2 Sat by Pulse 100 98 98





Oximetry (%)   


 


Oxygen Delivery   Room Air





Method   














  09/14/20 09/14/20 09/14/20





  10:00 15:16 18:00


 


Breakfast  50% 


 


Lunch  50% 


 


Supper   50%


 


Temperature   


 


Pulse Rate   


 


Blood Pressure   


 


O2 Sat by Pulse 98 98 98





Oximetry (%)   


 


Oxygen Delivery   





Method   














  09/14/20 09/14/20 09/15/20





  21:00 22:00 06:00


 


Breakfast   


 


Lunch   


 


Supper   


 


Temperature   98.2 F


 


Pulse Rate   82


 


Blood Pressure   119/68


 


O2 Sat by Pulse 97 97 98





Oximetry (%)   


 


Oxygen Delivery Room Air  





Method   








                                Laboratory Tests











  09/14/20 09/15/20





  07:33 08:05


 


WBC  17.2 H  15.3 H








Suggest 


Pt needs pureed consistency to be thinned out to reduce stasis in pharynx


2 hard swallows per 1/2 tsp


Alternate with sip of nectar thick liquid


No thin liquids

## 2020-09-15 NOTE — PN
Progress Note, Physician


Chief Complaint: 





Sepsis


JO ANN


UTI


Bacteremia


Dysphagia


History of Present Illness: 





Mr. Lovelace is an 79 year old male BIBA from [facility] for 

hypotension (SBP in the 70's). He has a past medical history of hypertension, 

CHF, aortic stenosis s/p bioprosthetic valve replacement, CAD, prior stent, 

GERD,TIA,BPH and indwelling swenson. 





NAD


c/o fatigue


lethargic


Denies any pain or SOB


Afebrile





- Current Medication List


Current Medications: 


Active Medications





Acetaminophen (Tylenol -)  1,000 mg PO Q6H PRN


   PRN Reason: PAIN


   Last Admin: 09/15/20 08:39 Dose:  1,000 mg


   Documented by: 


Albuterol Sulfate (Ventolin Hfa Inhaler -)  2 puff IH TID PRN


   PRN Reason: ASTHMA


Ascorbic Acid (Vitamin C -)  250 mg PO DAILY [facility]


   Last Admin: 09/14/20 11:32 Dose:  250 mg


   Documented by: 


Aspirin (Asa -)  81 mg PO DAILY [facility]


   Last Admin: 09/14/20 11:33 Dose:  81 mg


   Documented by: 


Atorvastatin Calcium (Lipitor -)  20 mg PO HS [facility]


   Last Admin: 09/14/20 22:10 Dose:  Not Given


   Documented by: 


Ferrous Sulfate (Feosol -)  325 mg PO DAILY [facility]


   Last Admin: 09/14/20 11:33 Dose:  325 mg


   Documented by: 


Ampicillin Sodium 2 gm/ Sodium (Chloride)  100 mls @ 200 mls/hr IVPB Q4H-IV [facility];

Protocol


   Last Admin: 09/15/20 09:19 Dose:  200 mls/hr


   Documented by: 


Cefepime HCl 2 gm/ Dextrose  100 mls @ 200 mls/hr IVPB Q8H-IV [facility]; Protocol


   Last Admin: 09/15/20 01:50 Dose:  200 mls/hr


   Documented by: 


Sodium Chloride (1/2 Normal Saline)  1,000 mls @ 50 mls/hr IV ASDIR [facility]


   Stop: 09/15/20 13:58


   Last Admin: 09/14/20 14:37 Dose:  50 mls/hr


   Documented by: 


Levothyroxine Sodium (Synthroid -)  100 mcg PO ACBK [facility]


   Last Admin: 09/15/20 06:45 Dose:  100 mcg


   Documented by: 


Multivitamins/Minerals/Vitamin C (Tab-A-Vit -)  1 tab PO DAILY [facility]


   Last Admin: 09/14/20 11:32 Dose:  1 tab


   Documented by: 


Pantoprazole Sodium (Protonix -)  20 mg PO ACBK [facility]


   Last Admin: 09/15/20 06:41 Dose:  Not Given


   Documented by: 


Senna (Senna -)  2 tab PO HS [facility]


Tamsulosin HCl (Flomax -)  0.4 mg PO DAILY@0830 [facility]


   Last Admin: 09/15/20 08:38 Dose:  0.4 mg


   Documented by: 











- Objective


Vital Signs: 


                                   Vital Signs











Temperature  98.2 F   09/15/20 06:00


 


Pulse Rate  82   09/15/20 06:00


 


Respiratory Rate  20   09/15/20 06:00


 


Blood Pressure  119/68   09/15/20 06:00


 


O2 Sat by Pulse Oximetry (%)  98   09/15/20 06:00











Constitutional: Yes: Well Nourished, Calm


Cardiovascular: Yes: Regular Rate and Rhythm


Respiratory: Yes: Regular, CTA Bilaterally


Gastrointestinal: Yes: Normal Bowel Sounds, Soft


Genitourinary: Yes: Swenson Present


Musculoskeletal: Yes: Muscle Weakness


Extremities: Yes: WNL


Edema: No


Peripheral Pulses WNL: Yes


Neurological: Yes: Alert, Oriented


Psychiatric: Yes: Alert, Oriented


Labs: 


                                    CBC, BMP





                                 09/15/20 08:05 





                                 09/15/20 08:05 





                                    INR, PTT











INR  0.95  (0.83-1.09)   09/07/20  22:50    














Problem List





- Problems


(1) Chronic retention of urine


Assessment/Plan: 


-Continue swenson cath


Problems reviewed: Yes   


Code(s): R33.9 - RETENTION OF URINE, UNSPECIFIED   





(2) JO ANN (acute kidney injury)


Assessment/Plan: 


-Nephrology consult


-Renal U/S


-UC possible pseudomonas


-Cr trending down


-Continue IVF


Problems reviewed: Yes   


Code(s): N17.9 - ACUTE KIDNEY FAILURE, UNSPECIFIED   





(3) Hyperkalemia


Assessment/Plan: 


-resolved


Problems reviewed: Yes   


Code(s): E87.5 - HYPERKALEMIA   





(4) Sepsis


Assessment/Plan: 


-ID consult


-IV Ampicillin


-No Lactic acidosis


-Afebrile


-Cultures:


                                  Microbiology





09/10/20 09:37   Blood - Peripheral Venous   Blood Culture - Final


                            NO GROWTH AFTER 5 DAYS INCUBATION


09/09/20 17:15   Blood - Peripheral Venous   Blood Culture - Final


                            NO GROWTH AFTER 5 DAYS INCUBATION


09/09/20 17:30   Blood - Peripheral Venous   Blood Culture - Final


                            NO GROWTH AFTER 5 DAYS INCUBATION


09/09/20 22:25   Urine - Urine Swenson   Urine Culture - Final


                            Pseudomonas Aeruginosa


09/07/20 22:50   Blood - Peripheral Venous   Blood Culture - Final


                            Group D Strep Or Entero Coccus


                            Pseudomonas Species


09/07/20 22:50   Blood - Peripheral Venous   Blood Culture - Final


                            Enterococcus Faecalis


                            Pseudomonas Aeruginosa


09/08/20 00:27   Urine - Urine - Catheterized   Urine Culture - Final


                            Contaminated: Please Repeat











Problems reviewed: Yes   


Code(s): A41.9 - SEPSIS, UNSPECIFIED ORGANISM   





(5) Failure to thrive


Assessment/Plan: 


-Add multivitamin


-Ensure pudding + Magic cup


-Seen by Speech pathology


-MBS- Mod to severe stasis with puree. Aspiration on thin liquid


-Dysphagia puree thinned out with nectar thick liquids





Problems reviewed: Yes   


Code(s): IZG9485 -    





(6) Malnutrition


Problems reviewed: Yes   


Code(s): E46 - UNSPECIFIED PROTEIN-CALORIE MALNUTRITION   





(7) Bacteremia


Assessment/Plan: 


-Cultures:


                                  Microbiology





09/10/20 09:37   Blood - Peripheral Venous   Blood Culture - Preliminary


                            NO GROWTH OBTAINED AFTER 48 HOURS, INCUBATION TO 

CONTINUE


                            FOR 3 DAYS.


09/09/20 17:30   Blood - Peripheral Venous   Blood Culture - Preliminary


                            NO GROWTH OBTAINED AFTER 48 HOURS, INCUBATION TO 

CONTINUE


                            FOR 3 DAYS.


09/09/20 17:15   Blood - Peripheral Venous   Blood Culture - Preliminary


                            NO GROWTH OBTAINED AFTER 48 HOURS, INCUBATION TO 

CONTINUE


                            FOR 3 DAYS.


09/09/20 22:25   Urine - Urine Swenson   Urine Culture - Preliminary


                            Presumptive Ps Aeruginosa


09/07/20 22:50   Blood - Peripheral Venous   Blood Culture - Final


                            Group D Strep Or Entero Coccus


                            Pseudomonas Species


09/07/20 22:50   Blood - Peripheral Venous   Blood Culture - Final


                            Enterococcus Faecalis


                            Pseudomonas Aeruginosa


09/08/20 00:27   Urine - Urine - Catheterized   Urine Culture - Final


                            Contaminated: Please Repeat





-rest as above


Problems reviewed: Yes   


Code(s): R78.81 - BACTEREMIA   





Assessment/Plan





See problem list

## 2020-09-15 NOTE — PN
Progress Note, Physician


History of Present Illness: 





Pt seen and examined at bedside. He is awake and appears comfortable. 





- Current Medication List


Current Medications: 


Active Medications





Acetaminophen (Tylenol -)  1,000 mg PO Q6H PRN


   PRN Reason: PAIN LEVEL 1-5


Albuterol Sulfate (Ventolin Hfa Inhaler -)  2 puff IH TID PRN


   PRN Reason: ASTHMA


Ascorbic Acid (Vitamin C -)  250 mg PO DAILY [facility]


   Last Admin: 09/15/20 11:38 Dose:  250 mg


   Documented by: 


Aspirin (Asa -)  81 mg PO DAILY [facility]


   Last Admin: 09/15/20 11:37 Dose:  81 mg


   Documented by: 


Atorvastatin Calcium (Lipitor -)  20 mg PO HS [facility]


   Last Admin: 09/14/20 22:10 Dose:  Not Given


   Documented by: 


Famotidine (Pepcid -)  20 mg PO DAILY [facility]


   Last Admin: 09/15/20 11:38 Dose:  20 mg


   Documented by: 


Ferrous Sulfate (Feosol -)  325 mg PO DAILY [facility]


   Last Admin: 09/15/20 11:37 Dose:  325 mg


   Documented by: 


Ampicillin Sodium 2 gm/ Sodium (Chloride)  100 mls @ 200 mls/hr IVPB Q4H-IV [facility];

Protocol


   Last Admin: 09/15/20 13:31 Dose:  200 mls/hr


   Documented by: 


Cefepime HCl 2 gm/ Dextrose  100 mls @ 200 mls/hr IVPB Q8H-IV [facility]; Protocol


   Last Admin: 09/15/20 11:37 Dose:  200 mls/hr


   Documented by: 


Levothyroxine Sodium (Synthroid -)  100 mcg PO ACBK [facility]


   Last Admin: 09/15/20 06:45 Dose:  100 mcg


   Documented by: 


Multivitamins/Minerals/Vitamin C (Tab-A-Vit -)  1 tab PO DAILY [facility]


   Last Admin: 09/15/20 11:37 Dose:  1 tab


   Documented by: 


Senna (Senna -)  2 tab PO [facility]


Tamsulosin HCl (Flomax -)  0.4 mg PO DAILY@0830 [facility]


   Last Admin: 09/15/20 08:38 Dose:  0.4 mg


   Documented by: 


Tramadol HCl (Ultram -)  50 mg PO Q8H PRN


   PRN Reason: PAIN LEVEL 6-10


   Last Admin: 09/15/20 11:28 Dose:  50 mg


   Documented by: 











- Objective


Vital Signs: 


                                   Vital Signs











Temperature  98 F   09/15/20 10:00


 


Pulse Rate  82   09/15/20 10:00


 


Respiratory Rate  20   09/15/20 10:00


 


Blood Pressure  134/73   09/15/20 10:00


 


O2 Sat by Pulse Oximetry (%)  98   09/15/20 10:00











Constitutional: Yes: Calm


Eyes: Yes: Conjunctiva Clear


HENT: Yes: Atraumatic


Cardiovascular: Yes: S1, S2


Respiratory: Yes: CTA Bilaterally


Gastrointestinal: Yes: Soft


Genitourinary: Yes: Incontinence


Musculoskeletal: Yes: Muscle Weakness


Neurological: Yes: Oriented


Labs: 


                                    CBC, BMP





                                 09/15/20 08:05 





                                 09/15/20 08:05 





                                    INR, PTT











INR  0.95  (0.83-1.09)   09/07/20  22:50    














Problem List





- Problems


(1) JO ANN (acute kidney injury)


Code(s): N17.9 - ACUTE KIDNEY FAILURE, UNSPECIFIED   





(2) Hyperkalemia


Code(s): E87.5 - HYPERKALEMIA   





(3) Sepsis


Code(s): A41.9 - SEPSIS, UNSPECIFIED ORGANISM   





Assessment/Plan


                               Current Medications











Generic Name Dose Route Start Last Admin





  Trade Name Freq  PRN Reason Stop Dose Admin


 


Acetaminophen  1,000 mg  09/15/20 11:04 





  Tylenol -  PO  





  Q6H PRN  





  PAIN LEVEL 1-5  


 


Albuterol Sulfate  2 puff  09/08/20 01:38 





  Ventolin Hfa Inhaler -  IH  





  TID PRN  





  ASTHMA  


 


Ascorbic Acid  250 mg  09/08/20 10:00  09/15/20 11:38





  Vitamin C -  PO   250 mg





  DAILY [facility]   Administration


 


Aspirin  81 mg  09/08/20 10:00  09/15/20 11:37





  Asa -  PO   81 mg





  DAILY [facility]   Administration


 


Atorvastatin Calcium  20 mg  09/08/20 22:00  09/14/20 22:10





  Lipitor -  PO   Not Given





  HS [facility]  


 


Famotidine  20 mg  09/15/20 11:15  09/15/20 11:38





  Pepcid -  PO   20 mg





  DAILY [facility]   Administration


 


Ferrous Sulfate  325 mg  09/08/20 10:00  09/15/20 11:37





  Feosol -  PO   325 mg





  DAILY [facility]   Administration


 


Ampicillin Sodium 2 gm/ Sodium  100 mls @ 200 mls/hr  09/14/20 18:00  09/15/20 

13:31





  Chloride  IVPB   200 mls/hr





  Q4H-IV [facility]   Administration





  Protocol  


 


Cefepime HCl 2 gm/ Dextrose  100 mls @ 200 mls/hr  09/14/20 14:00  09/15/20 

11:37





  IVPB   200 mls/hr





  Q8H-IV [facility]   Administration





  Protocol  


 


Levothyroxine Sodium  100 mcg  09/08/20 07:00  09/15/20 06:45





  Synthroid -  PO   100 mcg





  ACBK [facility]   Administration


 


Multivitamins/Minerals/Vitamin C  1 tab  09/12/20 10:15  09/15/20 11:37





  Tab-A-Vit -  PO   1 tab





  DAILY [facility]   Administration


 


Senna  2 tab  09/15/20 22:00 





  Senna -  PO  





  HS [facility]  


 


Tamsulosin HCl  0.4 mg  09/08/20 08:30  09/15/20 08:38





  Flomax -  PO   0.4 mg





  DAILY@0830 [facility]   Administration


 


Tramadol HCl  50 mg  09/15/20 11:03  09/15/20 11:28





  Ultram -  PO   50 mg





  Q8H PRN   Administration





  PAIN LEVEL 6-10  














Impression


1. JO ANN


2. sepsis


3. hyperkalemia


4. hypotension


5. chf


6. bph


7. cad


8. tia


9. gerd


10. hypernatremia





Plan


- will give a dose of lokelma


- will start fluids to drive down potassium


- repeat labs in am


- poor po intake

## 2020-09-16 LAB
ALBUMIN SERPL-MCNC: 1.5 G/DL (ref 3.4–5)
ALP SERPL-CCNC: 172 U/L (ref 45–117)
ALT SERPL-CCNC: 19 U/L (ref 13–61)
ANION GAP SERPL CALC-SCNC: 6 MMOL/L (ref 8–16)
AST SERPL-CCNC: 55 U/L (ref 15–37)
BILIRUB SERPL-MCNC: 0.8 MG/DL (ref 0.2–1)
BUN SERPL-MCNC: 30.3 MG/DL (ref 7–18)
CALCIUM SERPL-MCNC: 7.2 MG/DL (ref 8.5–10.1)
CHLORIDE SERPL-SCNC: 107 MMOL/L (ref 98–107)
CO2 SERPL-SCNC: 22 MMOL/L (ref 21–32)
CREAT SERPL-MCNC: 1.2 MG/DL (ref 0.55–1.3)
GLUCOSE SERPL-MCNC: 74 MG/DL (ref 74–106)
POTASSIUM SERPLBLD-SCNC: 5.3 MMOL/L (ref 3.5–5.1)
PROT SERPL-MCNC: 5.6 G/DL (ref 6.4–8.2)
SODIUM SERPL-SCNC: 135 MMOL/L (ref 136–145)

## 2020-09-16 RX ADMIN — MULTIVITAMIN TABLET SCH TAB: TABLET at 09:54

## 2020-09-16 RX ADMIN — SODIUM CHLORIDE SCH MLS/HR: 9 INJECTION, SOLUTION INTRAVENOUS at 18:26

## 2020-09-16 RX ADMIN — AMPICILLIN SODIUM SCH MLS/HR: 2 INJECTION, POWDER, FOR SOLUTION INTRAMUSCULAR; INTRAVENOUS at 14:27

## 2020-09-16 RX ADMIN — ACETAMINOPHEN PRN MG: 500 TABLET, FILM COATED ORAL at 00:57

## 2020-09-16 RX ADMIN — ATORVASTATIN CALCIUM SCH MG: 20 TABLET, FILM COATED ORAL at 21:35

## 2020-09-16 RX ADMIN — SENNOSIDES SCH TAB: 8.6 TABLET, FILM COATED ORAL at 21:35

## 2020-09-16 RX ADMIN — CEFEPIME HYDROCHLORIDE SCH MLS/HR: 2 INJECTION, POWDER, FOR SOLUTION INTRAVENOUS at 03:08

## 2020-09-16 RX ADMIN — FERROUS SULFATE TAB EC 324 MG (65 MG FE EQUIVALENT) SCH MG: 324 (65 FE) TABLET DELAYED RESPONSE at 09:53

## 2020-09-16 RX ADMIN — AMPICILLIN SODIUM SCH MLS/HR: 2 INJECTION, POWDER, FOR SOLUTION INTRAMUSCULAR; INTRAVENOUS at 19:04

## 2020-09-16 RX ADMIN — FAMOTIDINE SCH MG: 20 TABLET ORAL at 09:53

## 2020-09-16 RX ADMIN — AMPICILLIN SODIUM SCH MLS/HR: 2 INJECTION, POWDER, FOR SOLUTION INTRAMUSCULAR; INTRAVENOUS at 02:17

## 2020-09-16 RX ADMIN — Medication SCH MG: at 09:53

## 2020-09-16 RX ADMIN — CEFEPIME HYDROCHLORIDE SCH MLS/HR: 2 INJECTION, POWDER, FOR SOLUTION INTRAVENOUS at 18:38

## 2020-09-16 RX ADMIN — TAMSULOSIN HYDROCHLORIDE SCH MG: 0.4 CAPSULE ORAL at 09:53

## 2020-09-16 RX ADMIN — AMPICILLIN SODIUM SCH MLS/HR: 2 INJECTION, POWDER, FOR SOLUTION INTRAMUSCULAR; INTRAVENOUS at 21:34

## 2020-09-16 RX ADMIN — CEFEPIME HYDROCHLORIDE SCH MLS/HR: 2 INJECTION, POWDER, FOR SOLUTION INTRAVENOUS at 09:52

## 2020-09-16 RX ADMIN — ASPIRIN 81 MG SCH MG: 81 TABLET ORAL at 09:53

## 2020-09-16 RX ADMIN — SODIUM ZIRCONIUM CYCLOSILICATE SCH: 5 POWDER, FOR SUSPENSION ORAL at 15:55

## 2020-09-16 RX ADMIN — LEVOTHYROXINE SODIUM SCH MCG: 100 TABLET ORAL at 06:14

## 2020-09-16 RX ADMIN — AMPICILLIN SODIUM SCH MLS/HR: 2 INJECTION, POWDER, FOR SOLUTION INTRAMUSCULAR; INTRAVENOUS at 05:37

## 2020-09-16 NOTE — PN
Progress Note, Physician


Chief Complaint: 





Sepsis


JO ANN


UTI


Bacteremia


Dysphagia


History of Present Illness: 





Mr. Lovelace is an 79 year old male BIBA from [facility] for 

hypotension (SBP in the 70's). He has a past medical history of hypertension, 

CHF, aortic stenosis s/p bioprosthetic valve replacement, CAD, prior stent, 

GERD,TIA,BPH and indwelling swenson. 





NAD


c/o fatigue


lethargic


Denies any pain or SOB


Afebrile





CTAP + fecal impaction


refuses enema twice


Receiving senna 2 tabs HS


received MOM once


unable to receive miralax-cannot be mixed with thickeners





- Current Medication List


Current Medications: 


Active Medications





Acetaminophen (Tylenol -)  1,000 mg PO Q6H PRN


   PRN Reason: PAIN LEVEL 1-5


   Last Admin: 09/16/20 00:57 Dose:  1,000 mg


   Documented by: 


Albuterol Sulfate (Ventolin Hfa Inhaler -)  2 puff IH TID PRN


   PRN Reason: ASTHMA


Ascorbic Acid (Vitamin C -)  250 mg PO DAILY [facility]


   Last Admin: 09/16/20 09:53 Dose:  250 mg


   Documented by: 


Aspirin (Asa -)  81 mg PO DAILY [facility]


   Last Admin: 09/16/20 09:53 Dose:  81 mg


   Documented by: 


Atorvastatin Calcium (Lipitor -)  20 mg PO [facility]


   Last Admin: 09/15/20 21:21 Dose:  20 mg


   Documented by: 


Famotidine (Pepcid -)  20 mg PO DAILY [facility]


   Last Admin: 09/16/20 09:53 Dose:  20 mg


   Documented by: 


Ferrous Sulfate (Feosol -)  325 mg PO DAILY [facility]


   Last Admin: 09/16/20 09:53 Dose:  325 mg


   Documented by: 


Ampicillin Sodium 2 gm/ Sodium (Chloride)  100 mls @ 200 mls/hr IVPB Q4H-IV [facility];

Protocol


   Last Admin: 09/16/20 05:37 Dose:  200 mls/hr


   Documented by: 


Cefepime HCl 2 gm/ Dextrose  100 mls @ 200 mls/hr IVPB Q8H-IV [facility]; Protocol


   Last Admin: 09/16/20 09:52 Dose:  200 mls/hr


   Documented by: 


Sodium Chloride (1/2 Normal Saline)  1,000 mls @ 50 mls/hr IV ASDIR [facility]


   Stop: 09/16/20 15:20


   Last Admin: 09/15/20 16:10 Dose:  50 mls/hr


   Documented by: 


Levothyroxine Sodium (Synthroid -)  100 mcg PO ACBK [facility]


   Last Admin: 09/16/20 06:14 Dose:  100 mcg


   Documented by: 


Multivitamins/Minerals/Vitamin C (Tab-A-Vit -)  1 tab PO DAILY [facility]


   Last Admin: 09/16/20 09:54 Dose:  1 tab


   Documented by: 


Senna (Senna -)  2 tab PO HS [facility]


   Last Admin: 09/15/20 21:36 Dose:  Not Given


   Documented by: 


Tamsulosin HCl (Flomax -)  0.4 mg PO DAILY@0830 [facility]


   Last Admin: 09/16/20 09:53 Dose:  0.4 mg


   Documented by: 


Tramadol HCl (Ultram -)  50 mg PO Q8H PRN


   PRN Reason: PAIN LEVEL 6-10


   Last Admin: 09/16/20 09:54 Dose:  50 mg


   Documented by: 











- Objective


Vital Signs: 


                                   Vital Signs











Temperature  98.1 F   09/16/20 08:45


 


Pulse Rate  89   09/16/20 08:45


 


Respiratory Rate  20   09/16/20 08:45


 


Blood Pressure  115/69   09/16/20 08:45


 


O2 Sat by Pulse Oximetry (%)  96   09/16/20 08:45











Constitutional: Yes: Well Nourished, No Distress, Calm


Cardiovascular: Yes: Regular Rate and Rhythm


Respiratory: Yes: Regular, Diminished


Gastrointestinal: Yes: Normal Bowel Sounds, Soft


Genitourinary: Yes: Swenson Present


Musculoskeletal: Yes: Muscle Weakness


Extremities: Yes: WNL


Edema: No


Peripheral Pulses WNL: Yes


Neurological: Yes: Alert, Oriented


Psychiatric: Yes: Alert, Oriented


Labs: 


                                    CBC, BMP





                                 09/15/20 08:05 





                                 09/16/20 07:40 





                                    INR, PTT











INR  0.95  (0.83-1.09)   09/07/20  22:50    














Problem List





- Problems


(1) Chronic retention of urine


Assessment/Plan: 


-Continue swenson cath


Problems reviewed: Yes   


Code(s): R33.9 - RETENTION OF URINE, UNSPECIFIED   





(2) JO ANN (acute kidney injury)


Assessment/Plan: 


-Nephrology consult


-Renal U/S


-UC possible pseudomonas


-Cr trending down


-Continue IVF


Problems reviewed: Yes   


Code(s): N17.9 - ACUTE KIDNEY FAILURE, UNSPECIFIED   





(3) Hyperkalemia


Assessment/Plan: 


-resolved


Problems reviewed: Yes   


Code(s): E87.5 - HYPERKALEMIA   





(4) Sepsis


Assessment/Plan: 


-ID consult


-IV Cefepime + Ampicillin


-No Lactic acidosis


-Afebrile


-Cultures:


                                  Microbiology





09/07/20 22:50   Blood - Peripheral Venous   Blood Culture - Preliminary


                            Pending Organism


                            Pending Organism#2


09/07/20 22:50   Blood - Peripheral Venous   Blood Culture - Preliminary


                            Pending Organism


                            Pending Organism#2











Problems reviewed: Yes   


Code(s): A41.9 - SEPSIS, UNSPECIFIED ORGANISM   





(5) Failure to thrive


Assessment/Plan: 


-Add multivitamin


-Ensure pudding + Magic cup


-Seen by Speech pathology


-MBS- Mod to severe stasis with puree. Aspiration on thin liquid


-Dysphagia puree thinned out with nectar thick liquids





Problems reviewed: Yes   


Code(s): [id number] -    





(6) Malnutrition


Problems reviewed: Yes   


Code(s): E46 - UNSPECIFIED PROTEIN-CALORIE MALNUTRITION   





(7) Bacteremia


Assessment/Plan: 


-Cultures:


                                  Microbiology





09/10/20 09:37   Blood - Peripheral Venous   Blood Culture - Preliminary


                            NO GROWTH OBTAINED AFTER 48 HOURS, INCUBATION TO 

CONTINUE


                            FOR 3 DAYS.


09/09/20 17:30   Blood - Peripheral Venous   Blood Culture - Preliminary


                            NO GROWTH OBTAINED AFTER 48 HOURS, INCUBATION TO 

CONTINUE


                            FOR 3 DAYS.


09/09/20 17:15   Blood - Peripheral Venous   Blood Culture - Preliminary


                            NO GROWTH OBTAINED AFTER 48 HOURS, INCUBATION TO 

CONTINUE


                            FOR 3 DAYS.


09/09/20 22:25   Urine - Urine Swenson   Urine Culture - Preliminary


                            Presumptive Ps Aeruginosa


09/07/20 22:50   Blood - Peripheral Venous   Blood Culture - Final


                            Group D Strep Or Entero Coccus


                            Pseudomonas Species


09/07/20 22:50   Blood - Peripheral Venous   Blood Culture - Final


                            Enterococcus Faecalis


                            Pseudomonas Aeruginosa


09/08/20 00:27   Urine - Urine - Catheterized   Urine Culture - Final


                            Contaminated: Please Repeat





-rest as above


Problems reviewed: Yes   


Code(s): R78.81 - BACTEREMIA   





Assessment/Plan





See problem list

## 2020-09-16 NOTE — PN
Progress Note, SLP





- Note


Progress Note: 


                                Selected Entries











  09/15/20 09/15/20 09/16/20





  15:27 18:00 02:00


 


Breakfast 25%  


 


Diet Tolerated Poor Fair 


 


Lunch 25%  


 


Supper  50% 


 


Temperature   97.9 F


 


Pulse Rate   104 H


 


Blood Pressure   140/95














  09/16/20 09/16/20





  06:00 08:45


 


Breakfast  


 


Diet Tolerated  


 


Lunch  


 


Supper  


 


Temperature 98.5 F 98.1 F


 


Pulse Rate 87 89


 


Blood Pressure 100/56 L 115/69








                                Laboratory Tests











  09/12/20 09/14/20 09/15/20





  12:10 07:33 08:05


 


WBC  15.8 H  17.2 H  15.3 H











Alert, speech clear today. Voice strong. Forgetful.


Good mastication and tolerated sips of thin water well.


MBS-9/3-stasis, silent asp on thin liquid


Improving swallowing function suspected. Speech/voice improved





Consider trial of Dys chopped/careful sips of thin liquid


ASPIRATION PRECAUTIONS


Repeat MBS if any cough, congestion, fever

## 2020-09-16 NOTE — PN
Progress Note (short form)





- Note


Progress Note: 





noted to have fecal impaction and left hydro on ct scan


refuses to turn in bed to examine his back





                                        





                                  Vital Signs











 Period  Temp  Pulse  Resp  BP Sys/Villa  Pulse Ox


 


 Last 24 Hr  97.8 F-98.5 F    20-20  100-140/56-95  86-98








cor-rrr


llungs decreased bs at bases


abd soft,mild suprpubic discomfort


ext no edema





                                    CBC, BMP





                                 09/15/20 08:05 





                                 09/16/20 07:40 





                                  Microbiology





09/10/20 09:37   Blood - Peripheral Venous   Blood Culture - Final


                            NO GROWTH AFTER 5 DAYS INCUBATION


09/09/20 17:15   Blood - Peripheral Venous   Blood Culture - Final


                            NO GROWTH AFTER 5 DAYS INCUBATION


09/09/20 17:30   Blood - Peripheral Venous   Blood Culture - Final


                            NO GROWTH AFTER 5 DAYS INCUBATION


09/09/20 22:25   Urine - Urine Swenson   Urine Culture - Final


                            Pseudomonas Aeruginosa


09/07/20 22:50   Blood - Peripheral Venous   Blood Culture - Final


                            Group D Strep Or Entero Coccus


                            Pseudomonas Species


09/07/20 22:50   Blood - Peripheral Venous   Blood Culture - Final


                            Enterococcus Faecalis


                            Pseudomonas Aeruginosa


09/08/20 00:27   Urine - Urine - Catheterized   Urine Culture - Final


                            Contaminated: Please Repeat

















imp/reccd





pseudomona/enterococcal bacteremia secondary to UTI- contineu 

cefepime/ampicillin


BPH with chronic swenson 


AS with bioprosthetic aortic valve


DNR/DNI


fecal impaction-laxatives


left hydronephrosis





clinically improved











continue amp/cefepime day #9 antibiotics

## 2020-09-16 NOTE — PN
Progress Note, Physician


History of Present Illness: 





Pt seen and examined at beside. He is awake and appears comfortable. he denies 

shortness of breath. 





- Current Medication List


Current Medications: 


Active Medications





Acetaminophen (Tylenol -)  1,000 mg PO Q6H PRN


   PRN Reason: PAIN LEVEL 1-5


   Last Admin: 09/16/20 00:57 Dose:  1,000 mg


   Documented by: 


Albuterol Sulfate (Ventolin Hfa Inhaler -)  2 puff IH TID PRN


   PRN Reason: ASTHMA


Ascorbic Acid (Vitamin C -)  250 mg PO DAILY [facility]


   Last Admin: 09/16/20 09:53 Dose:  250 mg


   Documented by: 


Aspirin (Asa -)  81 mg PO DAILY [facility]


   Last Admin: 09/16/20 09:53 Dose:  81 mg


   Documented by: 


Atorvastatin Calcium (Lipitor -)  20 mg PO HS [facility]


   Last Admin: 09/15/20 21:21 Dose:  20 mg


   Documented by: 


Famotidine (Pepcid -)  20 mg PO DAILY [facility]


   Last Admin: 09/16/20 09:53 Dose:  20 mg


   Documented by: 


Ferrous Sulfate (Feosol -)  325 mg PO DAILY [facility]


   Last Admin: 09/16/20 09:53 Dose:  325 mg


   Documented by: 


Ampicillin Sodium 2 gm/ Sodium (Chloride)  100 mls @ 200 mls/hr IVPB Q4H-IV [facility];

Protocol


   Last Admin: 09/16/20 05:37 Dose:  200 mls/hr


   Documented by: 


Cefepime HCl 2 gm/ Dextrose  100 mls @ 200 mls/hr IVPB Q8H-IV [facility]; Protocol


   Last Admin: 09/16/20 09:52 Dose:  200 mls/hr


   Documented by: 


Sodium Chloride (1/2 Normal Saline)  1,000 mls @ 50 mls/hr IV ASDIR [facility]


   Stop: 09/16/20 15:20


   Last Admin: 09/15/20 16:10 Dose:  50 mls/hr


   Documented by: 


Levothyroxine Sodium (Synthroid -)  100 mcg PO ACBK [facility]


   Last Admin: 09/16/20 06:14 Dose:  100 mcg


   Documented by: 


Multivitamins/Minerals/Vitamin C (Tab-A-Vit -)  1 tab PO DAILY [facility]


   Last Admin: 09/16/20 09:54 Dose:  1 tab


   Documented by: 


Senna (Senna -)  2 tab PO HS [facility]


   Last Admin: 09/15/20 21:36 Dose:  Not Given


   Documented by: 


Tamsulosin HCl (Flomax -)  0.4 mg PO DAILY@0830 [facility]


   Last Admin: 09/16/20 09:53 Dose:  0.4 mg


   Documented by: 


Tramadol HCl (Ultram -)  50 mg PO Q8H PRN


   PRN Reason: PAIN LEVEL 6-10


   Last Admin: 09/16/20 09:54 Dose:  50 mg


   Documented by: 











- Objective


Vital Signs: 


                                   Vital Signs











Temperature  98.1 F   09/16/20 08:45


 


Pulse Rate  89   09/16/20 08:45


 


Respiratory Rate  20   09/16/20 08:45


 


Blood Pressure  115/69   09/16/20 08:45


 


O2 Sat by Pulse Oximetry (%)  96   09/16/20 08:45











Constitutional: Yes: Calm


Eyes: Yes: Conjunctiva Clear


HENT: Yes: Atraumatic


Neck: Yes: Supple


Cardiovascular: Yes: S1, S2


Respiratory: Yes: CTA Bilaterally


Gastrointestinal: Yes: Soft


Genitourinary: Yes: Rios Present


Musculoskeletal: Yes: Muscle Weakness


Edema: No


Neurological: Yes: Confusion


Labs: 


                                    CBC, BMP





                                 09/15/20 08:05 





                                 09/16/20 07:40 





                                    INR, PTT











INR  0.95  (0.83-1.09)   09/07/20  22:50    














Problem List





- Problems


(1) JO ANN (acute kidney injury)


Code(s): N17.9 - ACUTE KIDNEY FAILURE, UNSPECIFIED   





(2) Hyperkalemia


Code(s): E87.5 - HYPERKALEMIA   





(3) Sepsis


Code(s): A41.9 - SEPSIS, UNSPECIFIED ORGANISM   





Assessment/Plan


                               Current Medications











Generic Name Dose Route Start Last Admin





  Trade Name Freq  PRN Reason Stop Dose Admin


 


Acetaminophen  1,000 mg  09/15/20 11:04  09/16/20 00:57





  Tylenol -  PO   1,000 mg





  Q6H PRN   Administration





  PAIN LEVEL 1-5  


 


Albuterol Sulfate  2 puff  09/08/20 01:38 





  Ventolin Hfa Inhaler -  IH  





  TID PRN  





  ASTHMA  


 


Ascorbic Acid  250 mg  09/08/20 10:00  09/16/20 09:53





  Vitamin C -  PO   250 mg





  DAILY [facility]   Administration


 


Aspirin  81 mg  09/08/20 10:00  09/16/20 09:53





  Asa -  PO   81 mg





  DAILY [facility]   Administration


 


Atorvastatin Calcium  20 mg  09/08/20 22:00  09/15/20 21:21





  Lipitor -  PO   20 mg





  HS [facility]   Administration


 


Famotidine  20 mg  09/15/20 11:15  09/16/20 09:53





  Pepcid -  PO   20 mg





  DAILY [facility]   Administration


 


Ferrous Sulfate  325 mg  09/08/20 10:00  09/16/20 09:53





  Feosol -  PO   325 mg





  DAILY [facility]   Administration


 


Ampicillin Sodium 2 gm/ Sodium  100 mls @ 200 mls/hr  09/14/20 18:00  09/16/20 

05:37





  Chloride  IVPB   200 mls/hr





  Q4H-IV [facility]   Administration





  Protocol  


 


Cefepime HCl 2 gm/ Dextrose  100 mls @ 200 mls/hr  09/14/20 14:00  09/16/20 

09:52





  IVPB   200 mls/hr





  Q8H-IV [facility]   Administration





  Protocol  


 


Sodium Chloride  1,000 mls @ 50 mls/hr  09/15/20 15:30  09/15/20 16:10





  1/2 Normal Saline  IV  09/16/20 15:20  50 mls/hr





  ASDIR [facility]   Administration


 


Levothyroxine Sodium  100 mcg  09/08/20 07:00  09/16/20 06:14





  Synthroid -  PO   100 mcg





  ACBK [facility]   Administration


 


Multivitamins/Minerals/Vitamin C  1 tab  09/12/20 10:15  09/16/20 09:54





  Tab-A-Vit -  PO   1 tab





  DAILY ELL[facility]   Administration


 


Senna  2 tab  09/15/20 22:00  09/15/20 21:36





  Senna -  PO   Not Given





  HS [facility]  


 


Tamsulosin HCl  0.4 mg  09/08/20 08:30  09/16/20 09:53





  Flomax -  PO   0.4 mg





  DAILY@0830 [facility]   Administration


 


Tramadol HCl  50 mg  09/15/20 11:03  09/16/20 09:54





  Ultram -  PO   50 mg





  Q8H PRN   Administration





  PAIN LEVEL 6-10  














Impression


1. JO ANN


2. sepsis


3. hyperkalemia


4. hypotension


5. chf


6. bph


7. cad


8. tia


9. gerd


10. hypernatremia





Plan


- cont lokelma


- repeat labs in am


- cont fluids


- poor po intake

## 2020-09-17 RX ADMIN — LEVOTHYROXINE SODIUM SCH MCG: 100 TABLET ORAL at 06:02

## 2020-09-17 RX ADMIN — AMPICILLIN SODIUM SCH MLS/HR: 2 INJECTION, POWDER, FOR SOLUTION INTRAMUSCULAR; INTRAVENOUS at 22:29

## 2020-09-17 RX ADMIN — SODIUM CHLORIDE SCH MLS/HR: 9 INJECTION, SOLUTION INTRAVENOUS at 19:57

## 2020-09-17 RX ADMIN — TAMSULOSIN HYDROCHLORIDE SCH MG: 0.4 CAPSULE ORAL at 10:02

## 2020-09-17 RX ADMIN — ATORVASTATIN CALCIUM SCH: 20 TABLET, FILM COATED ORAL at 22:29

## 2020-09-17 RX ADMIN — CEFEPIME HYDROCHLORIDE SCH MLS/HR: 2 INJECTION, POWDER, FOR SOLUTION INTRAVENOUS at 10:37

## 2020-09-17 RX ADMIN — SODIUM ZIRCONIUM CYCLOSILICATE SCH GM: 5 POWDER, FOR SUSPENSION ORAL at 10:04

## 2020-09-17 RX ADMIN — CEFEPIME HYDROCHLORIDE SCH MLS/HR: 2 INJECTION, POWDER, FOR SOLUTION INTRAVENOUS at 18:24

## 2020-09-17 RX ADMIN — FERROUS SULFATE TAB EC 324 MG (65 MG FE EQUIVALENT) SCH MG: 324 (65 FE) TABLET DELAYED RESPONSE at 10:02

## 2020-09-17 RX ADMIN — ACETAMINOPHEN PRN MG: 500 TABLET, FILM COATED ORAL at 10:02

## 2020-09-17 RX ADMIN — Medication SCH MG: at 10:04

## 2020-09-17 RX ADMIN — AMPICILLIN SODIUM SCH MLS/HR: 2 INJECTION, POWDER, FOR SOLUTION INTRAMUSCULAR; INTRAVENOUS at 06:00

## 2020-09-17 RX ADMIN — MIRTAZAPINE SCH MG: 15 TABLET, FILM COATED ORAL at 22:12

## 2020-09-17 RX ADMIN — ATORVASTATIN CALCIUM SCH MG: 20 TABLET, FILM COATED ORAL at 22:12

## 2020-09-17 RX ADMIN — AMPICILLIN SODIUM SCH MLS/HR: 2 INJECTION, POWDER, FOR SOLUTION INTRAMUSCULAR; INTRAVENOUS at 15:30

## 2020-09-17 RX ADMIN — MULTIVITAMIN TABLET SCH TAB: TABLET at 10:02

## 2020-09-17 RX ADMIN — FAMOTIDINE SCH MG: 20 TABLET ORAL at 10:02

## 2020-09-17 RX ADMIN — ASPIRIN 81 MG SCH MG: 81 TABLET ORAL at 10:02

## 2020-09-17 RX ADMIN — MIRTAZAPINE SCH: 15 TABLET, FILM COATED ORAL at 22:29

## 2020-09-17 RX ADMIN — AMPICILLIN SODIUM SCH MLS/HR: 2 INJECTION, POWDER, FOR SOLUTION INTRAMUSCULAR; INTRAVENOUS at 03:04

## 2020-09-17 RX ADMIN — SENNOSIDES SCH TAB: 8.6 TABLET, FILM COATED ORAL at 22:12

## 2020-09-17 RX ADMIN — SENNOSIDES SCH: 8.6 TABLET, FILM COATED ORAL at 22:29

## 2020-09-17 RX ADMIN — AMPICILLIN SODIUM SCH MLS/HR: 2 INJECTION, POWDER, FOR SOLUTION INTRAMUSCULAR; INTRAVENOUS at 19:57

## 2020-09-17 RX ADMIN — LEVOTHYROXINE SODIUM SCH: 100 TABLET ORAL at 06:06

## 2020-09-17 RX ADMIN — CEFEPIME HYDROCHLORIDE SCH MLS/HR: 2 INJECTION, POWDER, FOR SOLUTION INTRAVENOUS at 01:53

## 2020-09-17 RX ADMIN — AMPICILLIN SODIUM SCH MLS/HR: 2 INJECTION, POWDER, FOR SOLUTION INTRAMUSCULAR; INTRAVENOUS at 10:02

## 2020-09-17 NOTE — CON.PSL
Psychology Consult


History Provided By: Medical Record, Caregiver


Limitations to Obtaining History: Other


Current Medications: 


Active Medications





Acetaminophen (Tylenol -)  1,000 mg PO Q6H PRN


   PRN Reason: PAIN LEVEL 1-5


   Last Admin: 09/16/20 00:57 Dose:  1,000 mg


   Documented by: 


Albuterol Sulfate (Ventolin Hfa Inhaler -)  2 puff IH TID PRN


   PRN Reason: ASTHMA


Ascorbic Acid (Vitamin C -)  250 mg PO DAILY [facility]


   Last Admin: 09/17/20 10:04 Dose:  250 mg


   Documented by: 


Aspirin (Asa -)  81 mg PO DAILY [facility]


   Last Admin: 09/17/20 10:02 Dose:  81 mg


   Documented by: 


Atorvastatin Calcium (Lipitor -)  20 mg PO [facility]


   Last Admin: 09/16/20 21:35 Dose:  20 mg


   Documented by: 


Famotidine (Pepcid -)  20 mg PO DAILY [facility]


   Last Admin: 09/17/20 10:02 Dose:  20 mg


   Documented by: 


Ferrous Sulfate (Feosol -)  325 mg PO DAILY [facility]


   Last Admin: 09/17/20 10:02 Dose:  325 mg


   Documented by: 


Ampicillin Sodium 2 gm/ Sodium (Chloride)  100 mls @ 200 mls/hr IVPB Q4H-IV [facility];

Protocol


   Last Admin: 09/17/20 10:02 Dose:  200 mls/hr


   Documented by: 


Cefepime HCl 2 gm/ Dextrose  100 mls @ 200 mls/hr IVPB Q8H-IV [facility]; Protocol


   Last Admin: 09/17/20 10:37 Dose:  200 mls/hr


   Documented by: 


Sodium Chloride (Normal Saline -)  1,000 mls @ 42 mls/hr IV ASDIR [facility]


   Last Admin: 09/16/20 18:26 Dose:  42 mls/hr


   Documented by: 


Levothyroxine Sodium (Synthroid -)  100 mcg PO ACBK [facility]


   Last Admin: 09/17/20 06:06 Dose:  Not Given


   Documented by: 


Mirtazapine (Remeron -)  15 mg PO [facility]


Multivitamins/Minerals/Vitamin C (Tab-A-Vit -)  1 tab PO DAILY [facility]


   Last Admin: 09/17/20 10:02 Dose:  1 tab


   Documented by: 


Senna (Senna -)  2 tab PO [facility]


   Last Admin: 09/16/20 21:35 Dose:  2 tab


   Documented by: 


Sodium Zirconium Cyclosilicate (Lokelma)  10 gm PO DAILY [facility]


   Last Admin: 09/17/20 10:04 Dose:  10 gm


   Documented by: 


Tamsulosin HCl (Flomax -)  0.4 mg PO DAILY@0830 [facility]


   Last Admin: 09/17/20 10:02 Dose:  0.4 mg


   Documented by: 


Tramadol HCl (Ultram -)  50 mg PO Q8H PRN


   PRN Reason: PAIN LEVEL 6-10


   Last Admin: 09/16/20 09:54 Dose:  50 mg


   Documented by: 








Allergies: 


                                    Allergies











Allergy/AdvReac Type Severity Reaction Status Date / Time


 


No Known Allergies Allergy   Verified 09/07/20 22:00











Does patient have pain?: Yes (Based on other chart reports he has pain ranging 

from 1-10.)





Assessment/Plan





Since the patient could not be seen nor spoken with due to telephone issues at 

the hospital, his nurse, Jenny, provided valuable information regarding 

this patient.





He is said to become verbally and physically aggressive by expressing 

profanities at staff and throwing things or hitting objects in his room. These 

behaviors appear to be triggered by the recent loss of his wife (about 3 months 

ago). 





An attempt to reach him later in the day will be made. 





The current clinical impression is that the patient is experiencing a severe 

bereavement reaction to his loss. 





Psychotropic management as ordered by Dr. Flores appears to be the best 

intervention for now. Once he is psychiatrically stable, psychotherapeutic i

ntervention may be attempted.





His nurse was spoken with Friday morning. There was no change in his being 

uncooperative as he refused the medication prescribed by Dr. Flores.


An attempt to communicate with the patient will be made if he is receptive to 

the consultation.

## 2020-09-17 NOTE — CON.PSY
Psychiatry Consult


Chief Complaint: 79 year 9old male a [id number] at [facility] seen for 

Psych eval for [id number] , agitation.. Patients apparntly lost his wife arnav

2 Months ago and been depressede since then. Patiewnt was not seen by Hira gardiner. Staff report [id number] [patient is not eating and been agiated on the 

unit.. Not [id number] in any self damaging behaviour.


Symptoms: reports: Depressed Mood, Appetite Disturbance, Depersonalization





- Previous Psychiatric Treatment


Outpatient: None


Inpatient: None





- Previous Substance Abuse Treatment


Outpatient: None


Inpatient: None





- Current Medications


Current Medications: 


Active Medications





Acetaminophen (Tylenol -)  1,000 mg PO Q6H PRN


   PRN Reason: PAIN LEVEL 1-5


   Last Admin: 20 00:57 Dose:  1,000 mg


   Documented by: 


Albuterol Sulfate (Ventolin Hfa Inhaler -)  2 puff IH TID PRN


   PRN Reason: ASTHMA


Ascorbic Acid (Vitamin C -)  250 mg PO DAILY [facility]


   Last Admin: 20 10:04 Dose:  250 mg


   Documented by: 


Aspirin (Asa -)  81 mg PO DAILY [facility]


   Last Admin: 20 10:02 Dose:  81 mg


   Documented by: 


Atorvastatin Calcium (Lipitor -)  20 mg PO HS [facility]


   Last Admin: 20 21:35 Dose:  20 mg


   Documented by: 


Famotidine (Pepcid -)  20 mg PO DAILY [facility]


   Last Admin: 20 10:02 Dose:  20 mg


   Documented by: 


Ferrous Sulfate (Feosol -)  325 mg PO DAILY [facility]


   Last Admin: 20 10:02 Dose:  325 mg


   Documented by: 


Ampicillin Sodium 2 gm/ Sodium (Chloride)  100 mls @ 200 mls/hr IVPB Q4H-IV [facility];

 Protocol


   Last Admin: 20 10:02 Dose:  200 mls/hr


   Documented by: 


Cefepime HCl 2 gm/ Dextrose  100 mls @ 200 mls/hr IVPB Q8H-IV [facility]; Protocol


   Last Admin: 20 10:37 Dose:  200 mls/hr


   Documented by: 


Sodium Chloride (Normal Saline -)  1,000 mls @ 42 mls/hr IV ASDIR [facility]


   Last Admin: 20 18:26 Dose:  42 mls/hr


   Documented by: 


Levothyroxine Sodium (Synthroid -)  100 mcg PO ACBK [facility]


   Last Admin: 20 06:06 Dose:  Not Given


   Documented by: 


Magnesium Citrate (Citroma -)  300 ml PO ONCE ONE


   Stop: 20 10:52


Multivitamins/Minerals/Vitamin C (Tab-A-Vit -)  1 tab PO DAILY [facility]


   Last Admin: 20 10:02 Dose:  1 tab


   Documented by: 


Senna (Senna -)  2 tab PO HS [facility]


   Last Admin: 20 21:35 Dose:  2 tab


   Documented by: 


Sodium Zirconium Cyclosilicate (Lokelma)  10 gm PO DAILY [facility]


   Last Admin: 20 10:04 Dose:  10 gm


   Documented by: 


Tamsulosin HCl (Flomax -)  0.4 mg PO DAILY@0830 [facility]


   Last Admin: 20 10:02 Dose:  0.4 mg


   Documented by: 


Tramadol HCl (Ultram -)  50 mg PO Q8H PRN


   PRN Reason: PAIN LEVEL 6-10


   Last Admin: 20 09:54 Dose:  50 mg


   Documented by: 











- Allergies


Allergies: 


                                    Allergies











Allergy/AdvReac Type Severity Reaction Status Date / Time


 


No Known Allergies Allergy   Verified 20 22:00














- Current Living Status


Usual Living Arrangement: Nursing Home





- Current Mental Status Evaluation


Appearance: Disheveled


Attitude: Guarded





- Affect


Affect: Constrictive


Appropriateness: Not Appropriate





- Mood


Mood: Depressed





- Speech/Language


Expressive: Coherent





- Psychomotor Activity


Psychomotor Activity: Hyperactive





- Thought Process


Thought Process: Circumstantial





- Thought Content


Hallucinations: Absent


Delusions: Absent





- Self Perception


Self Perception: No Impairment





- Cognition


Attention: Alert


Memory, Short Term: 2/3


Memory, Remote with Promptin/3





- Concentration


Serial Sevens Intact: No


Simple Calculations Intact: No





- Abstraction


Proverb Interpretation: Intact


Judgement: Minimally Impaired





- Insight


Insight: Impaired





- Impulse Control


Impulse Control: Minimally Impaired





- Suicidal Ideation


Suicidal Ideation: No





- Homicidal Ideation


Homicidal Ideation: No





Assessment/Plan





1) Start Remeron 15 mg po hs for Depression and appetite..

## 2020-09-17 NOTE — PN
Progress Note, SLP





- Note


Progress Note: 





                                Selected Entries











  09/16/20 09/16/20 09/17/20





  15:00 18:00 02:00


 


Breakfast 25%  


 


Intake, Oral   





Amount   


 


Lunch 25%  


 


Supper  75% 


 


Temperature   98.1 F


 


Pulse Rate   89


 


Respiratory   20





Rate   


 


O2 Sat by Pulse   96





Oximetry (%)   














  09/17/20 09/17/20





  06:00 10:10


 


Breakfast  


 


Intake, Oral 0 





Amount  


 


Lunch  


 


Supper  


 


Temperature  


 


Pulse Rate 101 H 88


 


Respiratory  22 H





Rate  


 


O2 Sat by Pulse 98 97





Oximetry (%)  








                                Laboratory Tests











  09/15/20





  08:05


 


WBC  15.3 H








Tolerating puree/nectar.

## 2020-09-17 NOTE — PN
Progress Note, SLP





- Note


Progress Note: 





                                Selected Entries











  09/16/20 09/16/20 09/17/20





  15:00 18:00 02:00


 


Breakfast 25%  


 


Intake, Oral   





Amount   


 


Lunch 25%  


 


Supper  75% 


 


Temperature   98.1 F


 


Pulse Rate   89


 


Respiratory   20





Rate   


 


O2 Sat by Pulse   96





Oximetry (%)   














  09/17/20 09/17/20





  06:00 10:10


 


Breakfast  


 


Intake, Oral 0 





Amount  


 


Lunch  


 


Supper  


 


Temperature  


 


Pulse Rate 101 H 88


 


Respiratory  22 H





Rate  


 


O2 Sat by Pulse 98 97





Oximetry (%)  








                                Laboratory Tests











  09/15/20





  08:05


 


WBC  15.3 H





                                Selected Entries











  09/16/20 09/16/20 09/17/20





  15:00 18:00 02:00


 


Breakfast 25%  


 


Lunch 25%  


 


Supper  75% 


 


Temperature   98.1 F


 


Pulse Rate   89


 


Blood Pressure   138/74














  09/17/20 09/17/20





  06:00 10:10


 


Breakfast  


 


Lunch  


 


Supper  


 


Temperature  98.1 F


 


Pulse Rate 101 H 88


 


Blood Pressure 138/74 121/72











Tolerating puree/nectar.


Consider trial of Dysphagia ground, jessica finely minced tuna, egg salad, chicken 

salad no celery, oatmeal, for improved po acceptance


Monitor tolerance


Repeat MBS for diet upgrade?

## 2020-09-17 NOTE — PN
Progress Note, Physician


History of Present Illness: 





Pt seen and examined at bedside. He is awake but gets easily agitated. 





- Current Medication List


Current Medications: 


Active Medications





Acetaminophen (Tylenol -)  1,000 mg PO Q6H PRN


   PRN Reason: PAIN LEVEL 1-5


   Last Admin: 09/16/20 00:57 Dose:  1,000 mg


   Documented by: 


Albuterol Sulfate (Ventolin Hfa Inhaler -)  2 puff IH TID PRN


   PRN Reason: ASTHMA


Ascorbic Acid (Vitamin C -)  250 mg PO DAILY [facility]


   Last Admin: 09/17/20 10:04 Dose:  250 mg


   Documented by: 


Aspirin (Asa -)  81 mg PO DAILY [facility]


   Last Admin: 09/17/20 10:02 Dose:  81 mg


   Documented by: 


Atorvastatin Calcium (Lipitor -)  20 mg PO HS [facility]


   Last Admin: 09/16/20 21:35 Dose:  20 mg


   Documented by: 


Famotidine (Pepcid -)  20 mg PO DAILY [facility]


   Last Admin: 09/17/20 10:02 Dose:  20 mg


   Documented by: 


Ferrous Sulfate (Feosol -)  325 mg PO DAILY [facility]


   Last Admin: 09/17/20 10:02 Dose:  325 mg


   Documented by: 


Ampicillin Sodium 2 gm/ Sodium (Chloride)  100 mls @ 200 mls/hr IVPB Q4H-IV [facility];

Protocol


   Last Admin: 09/17/20 10:02 Dose:  200 mls/hr


   Documented by: 


Cefepime HCl 2 gm/ Dextrose  100 mls @ 200 mls/hr IVPB Q8H-IV [facility]; Protocol


   Last Admin: 09/17/20 10:37 Dose:  200 mls/hr


   Documented by: 


Sodium Chloride (Normal Saline -)  1,000 mls @ 42 mls/hr IV ASDIR [facility]


   Last Admin: 09/16/20 18:26 Dose:  42 mls/hr


   Documented by: 


Levothyroxine Sodium (Synthroid -)  100 mcg PO ACBK [facility]


   Last Admin: 09/17/20 06:06 Dose:  Not Given


   Documented by: 


Mirtazapine (Remeron -)  15 mg PO [facility]


Multivitamins/Minerals/Vitamin C (Tab-A-Vit -)  1 tab PO DAILY [facility]


   Last Admin: 09/17/20 10:02 Dose:  1 tab


   Documented by: 


Senna (Senna -)  2 tab PO HS [facility]


   Last Admin: 09/16/20 21:35 Dose:  2 tab


   Documented by: 


Sodium Zirconium Cyclosilicate (Lokelma)  10 gm PO DAILY [facility]


   Last Admin: 09/17/20 10:04 Dose:  10 gm


   Documented by: 


Tamsulosin HCl (Flomax -)  0.4 mg PO DAILY@0830 [facility]


   Last Admin: 09/17/20 10:02 Dose:  0.4 mg


   Documented by: 


Tramadol HCl (Ultram -)  50 mg PO Q8H PRN


   PRN Reason: PAIN LEVEL 6-10


   Last Admin: 09/16/20 09:54 Dose:  50 mg


   Documented by: 











- Objective


Vital Signs: 


                                   Vital Signs











Temperature  98.1 F   09/17/20 10:10


 


Pulse Rate  88   09/17/20 10:10


 


Respiratory Rate  22 H  09/17/20 10:10


 


Blood Pressure  121/72   09/17/20 10:10


 


O2 Sat by Pulse Oximetry (%)  97   09/17/20 10:10











Constitutional: Yes: Anxious


Eyes: Yes: Conjunctiva Clear


HENT: Yes: Atraumatic


Neck: Yes: Supple


Cardiovascular: Yes: S1, S2


Respiratory: Yes: CTA Bilaterally


Gastrointestinal: Yes: Soft


Genitourinary: Yes: Incontinence


Musculoskeletal: Yes: Muscle Weakness


Edema: No


Neurological: Yes: Confusion


Labs: 


                                    CBC, BMP





                                 09/15/20 08:05 





                                 09/16/20 07:40 





                                    INR, PTT











INR  0.95  (0.83-1.09)   09/07/20  22:50    














Problem List





- Problems


(1) JO ANN (acute kidney injury)


Code(s): N17.9 - ACUTE KIDNEY FAILURE, UNSPECIFIED   





(2) Hyperkalemia


Code(s): E87.5 - HYPERKALEMIA   





(3) Sepsis


Code(s): A41.9 - SEPSIS, UNSPECIFIED ORGANISM   





Assessment/Plan


                               Current Medications











Generic Name Dose Route Start Last Admin





  Trade Name Freq  PRN Reason Stop Dose Admin


 


Acetaminophen  1,000 mg  09/15/20 11:04  09/16/20 00:57





  Tylenol -  PO   1,000 mg





  Q6H PRN   Administration





  PAIN LEVEL 1-5  


 


Albuterol Sulfate  2 puff  09/08/20 01:38 





  Ventolin Hfa Inhaler -  IH  





  TID PRN  





  ASTHMA  


 


Ascorbic Acid  250 mg  09/08/20 10:00  09/17/20 10:04





  Vitamin C -  PO   250 mg





  DAILY ELLEN   Administration


 


Aspirin  81 mg  09/08/20 10:00  09/17/20 10:02





  Asa -  PO   81 mg





  DAILY ELLEN   Administration


 


Atorvastatin Calcium  20 mg  09/08/20 22:00  09/16/20 21:35





  Lipitor -  PO   20 mg





  HS ELLEN   Administration


 


Famotidine  20 mg  09/15/20 11:15  09/17/20 10:02





  Pepcid -  PO   20 mg





  DAILY ELLEN   Administration


 


Ferrous Sulfate  325 mg  09/08/20 10:00  09/17/20 10:02





  Feosol -  PO   325 mg





  DAILY ELLEN   Administration


 


Ampicillin Sodium 2 gm/ Sodium  100 mls @ 200 mls/hr  09/14/20 18:00  09/17/20 

10:02





  Chloride  IVPB   200 mls/hr





  Q4H-IV ELLEN   Administration





  Protocol  


 


Cefepime HCl 2 gm/ Dextrose  100 mls @ 200 mls/hr  09/14/20 14:00  09/17/20 

10:37





  IVPB   200 mls/hr





  Q8H-IV ELLEN   Administration





  Protocol  


 


Sodium Chloride  1,000 mls @ 42 mls/hr  09/16/20 12:30  09/16/20 18:26





  Normal Saline -  IV   42 mls/hr





  ASDIR ELLEN   Administration


 


Levothyroxine Sodium  100 mcg  09/08/20 07:00  09/17/20 06:06





  Synthroid -  PO   Not Given





  ACBK ELLEN  


 


Mirtazapine  15 mg  09/17/20 22:00 





  Remeron -  PO  





  HS [facility]  


 


Multivitamins/Minerals/Vitamin C  1 tab  09/12/20 10:15  09/17/20 10:02





  Tab-A-Vit -  PO   1 tab





  DAILY ELLEN   Administration


 


Senna  2 tab  09/15/20 22:00  09/16/20 21:35





  Senna -  PO   2 tab





  HS ELLEN   Administration


 


Tamsulosin HCl  0.4 mg  09/08/20 08:30  09/17/20 10:02





  Flomax -  PO   0.4 mg





  DAILY@0830 ELLEN   Administration


 


Tramadol HCl  50 mg  09/15/20 11:03  09/16/20 09:54





  Ultram -  PO   50 mg





  Q8H PRN   Administration





  PAIN LEVEL 6-10  














Impression


1. JO ANN


2. sepsis


3. hyperkalemia


4. hypotension


5. chf


6. bph


7. cad


8. tia


9. gerd


10. hypernatremia





Plan


- check cmp if he agrees


- monitor lytes


- encourage po intake


- renal function improved


- poor po intake

## 2020-09-17 NOTE — PN
Progress Note, Physician


Chief Complaint: 





Sepsis


JO ANN


UTI


Bacteremia


Dysphagia


History of Present Illness: 





Mr. Lovelace is an 79 year old male BIBA from [facility] for 

hypotension (SBP in the 70's). He has a past medical history of hypertension, 

CHF, aortic stenosis s/p bioprosthetic valve replacement, CAD, prior stent, 

GERD,TIA,BPH and indwelling swenson. 





NAD


c/o fatigue


lethargic


Denies any pain or SOB


Afebrile





CTAP + fecal impaction


refuses enema twice


Receiving senna 2 tabs HS


received MOM once


unable to receive miralax-cannot be mixed with thickeners





- Current Medication List


Current Medications: 


Active Medications





Acetaminophen (Tylenol -)  1,000 mg PO Q6H PRN


   PRN Reason: PAIN LEVEL 1-5


   Last Admin: 09/16/20 00:57 Dose:  1,000 mg


   Documented by: 


Albuterol Sulfate (Ventolin Hfa Inhaler -)  2 puff IH TID PRN


   PRN Reason: ASTHMA


Ascorbic Acid (Vitamin C -)  250 mg PO DAILY [facility]


   Last Admin: 09/17/20 10:04 Dose:  250 mg


   Documented by: 


Aspirin (Asa -)  81 mg PO DAILY [facility]


   Last Admin: 09/17/20 10:02 Dose:  81 mg


   Documented by: 


Atorvastatin Calcium (Lipitor -)  20 mg PO [facility]


   Last Admin: 09/16/20 21:35 Dose:  20 mg


   Documented by: 


Famotidine (Pepcid -)  20 mg PO DAILY [facility]


   Last Admin: 09/17/20 10:02 Dose:  20 mg


   Documented by: 


Ferrous Sulfate (Feosol -)  325 mg PO DAILY [facility]


   Last Admin: 09/17/20 10:02 Dose:  325 mg


   Documented by: 


Ampicillin Sodium 2 gm/ Sodium (Chloride)  100 mls @ 200 mls/hr IVPB Q4H-IV ELLEN;

Protocol


   Last Admin: 09/17/20 10:02 Dose:  200 mls/hr


   Documented by: 


Cefepime HCl 2 gm/ Dextrose  100 mls @ 200 mls/hr IVPB Q8H-IV ELLEN; Protocol


   Last Admin: 09/17/20 10:37 Dose:  200 mls/hr


   Documented by: 


Sodium Chloride (Normal Saline -)  1,000 mls @ 42 mls/hr IV ASDIR [facility]


   Last Admin: 09/16/20 18:26 Dose:  42 mls/hr


   Documented by: 


Levothyroxine Sodium (Synthroid -)  100 mcg PO ACBK [facility]


   Last Admin: 09/17/20 06:06 Dose:  Not Given


   Documented by: 


Magnesium Citrate (Citroma -)  300 ml PO ONCE ONE


   Stop: 09/17/20 10:52


Multivitamins/Minerals/Vitamin C (Tab-A-Vit -)  1 tab PO DAILY [facility]


   Last Admin: 09/17/20 10:02 Dose:  1 tab


   Documented by: 


Senna (Senna -)  2 tab PO HS [facility]


   Last Admin: 09/16/20 21:35 Dose:  2 tab


   Documented by: 


Sodium Zirconium Cyclosilicate (Lokelma)  10 gm PO DAILY [facility]


   Last Admin: 09/17/20 10:04 Dose:  10 gm


   Documented by: 


Tamsulosin HCl (Flomax -)  0.4 mg PO DAILY@0830 [facility]


   Last Admin: 09/17/20 10:02 Dose:  0.4 mg


   Documented by: 


Tramadol HCl (Ultram -)  50 mg PO Q8H PRN


   PRN Reason: PAIN LEVEL 6-10


   Last Admin: 09/16/20 09:54 Dose:  50 mg


   Documented by: 











- Objective


Vital Signs: 


                                   Vital Signs











Temperature  98.1 F   09/17/20 10:10


 


Pulse Rate  88   09/17/20 10:10


 


Respiratory Rate  22 H  09/17/20 10:10


 


Blood Pressure  121/72   09/17/20 10:10


 


O2 Sat by Pulse Oximetry (%)  97   09/17/20 10:10











Constitutional: Yes: Well Nourished, No Distress, Calm


Cardiovascular: Yes: Regular Rate and Rhythm


Respiratory: Yes: Regular, CTA Bilaterally


Gastrointestinal: Yes: Soft, Hypoactive Bowel Sounds, Tenderness (suprapubic, 

RLQ+ LLQ)


Genitourinary: Yes: Swenson Present


Musculoskeletal: Yes: Muscle Weakness


Extremities: Yes: WNL


Edema: No


Peripheral Pulses WNL: Yes


Neurological: Yes: Alert, Oriented


Psychiatric: Yes: Alert, Oriented


Labs: 


                                    CBC, BMP





                                 09/15/20 08:05 





                                 09/16/20 07:40 





                                    INR, PTT











INR  0.95  (0.83-1.09)   09/07/20  22:50    














Problem List





- Problems


(1) Chronic retention of urine


Assessment/Plan: 


-Continue swenson cath


Problems reviewed: Yes   


Code(s): R33.9 - RETENTION OF URINE, UNSPECIFIED   





(2) JO ANN (acute kidney injury)


Assessment/Plan: 


-Nephrology consult


-Renal U/S


-UC possible pseudomonas


-Cr trending down


-Continue IVF


Problems reviewed: Yes   


Code(s): N17.9 - ACUTE KIDNEY FAILURE, UNSPECIFIED   





(3) Hyperkalemia


Assessment/Plan: 


-resolved


Problems reviewed: Yes   


Code(s): E87.5 - HYPERKALEMIA   





(4) Sepsis


Assessment/Plan: 


-ID consult


-IV Ampicillin


-No Lactic acidosis


-Afebrile


-Cultures:


                                  Microbiology





09/10/20 09:37   Blood - Peripheral Venous   Blood Culture - Final


                            NO GROWTH AFTER 5 DAYS INCUBATION


09/09/20 17:15   Blood - Peripheral Venous   Blood Culture - Final


                            NO GROWTH AFTER 5 DAYS INCUBATION


09/09/20 17:30   Blood - Peripheral Venous   Blood Culture - Final


                            NO GROWTH AFTER 5 DAYS INCUBATION


09/09/20 22:25   Urine - Urine Swenson   Urine Culture - Final


                            Pseudomonas Aeruginosa


09/07/20 22:50   Blood - Peripheral Venous   Blood Culture - Final


                            Group D Strep Or Entero Coccus


                            Pseudomonas Species


09/07/20 22:50   Blood - Peripheral Venous   Blood Culture - Final


                            Enterococcus Faecalis


                            Pseudomonas Aeruginosa


09/08/20 00:27   Urine - Urine - Catheterized   Urine Culture - Final


                            Contaminated: Please Repeat











Problems reviewed: Yes   


Code(s): A41.9 - SEPSIS, UNSPECIFIED ORGANISM   





(5) Failure to thrive


Assessment/Plan: 


-Add multivitamin


-Ensure pudding + Magic cup


-Seen by Speech pathology


-MBS- Mod to severe stasis with puree. Aspiration on thin liquid


-Dysphagia puree thinned out with nectar thick liquids





Problems reviewed: Yes   


Code(s): QFZ2735 -    





(6) Malnutrition


Problems reviewed: Yes   


Code(s): E46 - UNSPECIFIED PROTEIN-CALORIE MALNUTRITION   





(7) Bacteremia


Assessment/Plan: 


-Cultures:


                                  Microbiology





09/10/20 09:37   Blood - Peripheral Venous   Blood Culture - Preliminary


                            NO GROWTH OBTAINED AFTER 48 HOURS, INCUBATION TO 

CONTINUE


                            FOR 3 DAYS.


09/09/20 17:30   Blood - Peripheral Venous   Blood Culture - Preliminary


                            NO GROWTH OBTAINED AFTER 48 HOURS, INCUBATION TO 

CONTINUE


                            FOR 3 DAYS.


09/09/20 17:15   Blood - Peripheral Venous   Blood Culture - Preliminary


                            NO GROWTH OBTAINED AFTER 48 HOURS, INCUBATION TO 

CONTINUE


                            FOR 3 DAYS.


09/09/20 22:25   Urine - Urine Swenson   Urine Culture - Preliminary


                            Presumptive Ps Aeruginosa


09/07/20 22:50   Blood - Peripheral Venous   Blood Culture - Final


                            Group D Strep Or Entero Coccus


                            Pseudomonas Species


09/07/20 22:50   Blood - Peripheral Venous   Blood Culture - Final


                            Enterococcus Faecalis


                            Pseudomonas Aeruginosa


09/08/20 00:27   Urine - Urine - Catheterized   Urine Culture - Final


                            Contaminated: Please Repeat





-rest as above


Problems reviewed: Yes   


Code(s): R78.81 - BACTEREMIA   





(8) Constipation


Assessment/Plan: 


-Continue Senna 2 tabs HS


-Mag citrate once


Problems reviewed: Yes   


Code(s): K59.00 - CONSTIPATION, UNSPECIFIED   





Assessment/Plan





See problem list

## 2020-09-18 LAB
ALBUMIN SERPL-MCNC: 1.4 G/DL (ref 3.4–5)
ALP SERPL-CCNC: 157 U/L (ref 45–117)
ALT SERPL-CCNC: 13 U/L (ref 13–61)
ANION GAP SERPL CALC-SCNC: 8 MMOL/L (ref 8–16)
AST SERPL-CCNC: 57 U/L (ref 15–37)
BASOPHILS # BLD: 0.7 % (ref 0–2)
BILIRUB SERPL-MCNC: 0.4 MG/DL (ref 0.2–1)
BUN SERPL-MCNC: 27.6 MG/DL (ref 7–18)
CALCIUM SERPL-MCNC: 7.6 MG/DL (ref 8.5–10.1)
CHLORIDE SERPL-SCNC: 107 MMOL/L (ref 98–107)
CO2 SERPL-SCNC: 21 MMOL/L (ref 21–32)
CREAT SERPL-MCNC: 1.3 MG/DL (ref 0.55–1.3)
DEPRECATED RDW RBC AUTO: 17.9 % (ref 11.9–15.9)
EOSINOPHIL # BLD: 1.1 % (ref 0–4.5)
GLUCOSE SERPL-MCNC: 74 MG/DL (ref 74–106)
HCT VFR BLD CALC: 31.5 % (ref 35.4–49)
HGB BLD-MCNC: 10.1 GM/DL (ref 11.7–16.9)
LYMPHOCYTES # BLD: 5.8 % (ref 8–40)
MCH RBC QN AUTO: 25.9 PG (ref 25.7–33.7)
MCHC RBC AUTO-ENTMCNC: 32.1 G/DL (ref 32–35.9)
MCV RBC: 80.8 FL (ref 80–96)
MONOCYTES # BLD AUTO: 6.9 % (ref 3.8–10.2)
NEUTROPHILS # BLD: 85.5 % (ref 42.8–82.8)
PLATELET # BLD AUTO: 337 K/MM3 (ref 134–434)
PMV BLD: 8.3 FL (ref 7.5–11.1)
POTASSIUM SERPLBLD-SCNC: 4.6 MMOL/L (ref 3.5–5.1)
PROT SERPL-MCNC: 5.7 G/DL (ref 6.4–8.2)
RBC # BLD AUTO: 3.9 M/MM3 (ref 4–5.6)
SODIUM SERPL-SCNC: 136 MMOL/L (ref 136–145)
WBC # BLD AUTO: 13.1 K/MM3 (ref 4–10)

## 2020-09-18 RX ADMIN — AMPICILLIN SODIUM SCH MLS/HR: 2 INJECTION, POWDER, FOR SOLUTION INTRAMUSCULAR; INTRAVENOUS at 05:42

## 2020-09-18 RX ADMIN — MIRTAZAPINE SCH MG: 15 TABLET, FILM COATED ORAL at 22:11

## 2020-09-18 RX ADMIN — ASPIRIN 81 MG SCH MG: 81 TABLET ORAL at 16:51

## 2020-09-18 RX ADMIN — SODIUM CHLORIDE SCH: 9 INJECTION, SOLUTION INTRAVENOUS at 16:51

## 2020-09-18 RX ADMIN — ATORVASTATIN CALCIUM SCH MG: 20 TABLET, FILM COATED ORAL at 22:11

## 2020-09-18 RX ADMIN — CEFEPIME HYDROCHLORIDE SCH MLS/HR: 2 INJECTION, POWDER, FOR SOLUTION INTRAVENOUS at 11:00

## 2020-09-18 RX ADMIN — CEFEPIME HYDROCHLORIDE SCH MLS/HR: 2 INJECTION, POWDER, FOR SOLUTION INTRAVENOUS at 01:18

## 2020-09-18 RX ADMIN — MULTIVITAMIN TABLET SCH TAB: TABLET at 16:52

## 2020-09-18 RX ADMIN — AMPICILLIN SODIUM SCH MLS/HR: 2 INJECTION, POWDER, FOR SOLUTION INTRAMUSCULAR; INTRAVENOUS at 11:00

## 2020-09-18 RX ADMIN — CEFEPIME HYDROCHLORIDE SCH MLS/HR: 2 INJECTION, POWDER, FOR SOLUTION INTRAVENOUS at 18:30

## 2020-09-18 RX ADMIN — LEVOTHYROXINE SODIUM SCH MCG: 100 TABLET ORAL at 06:21

## 2020-09-18 RX ADMIN — AMPICILLIN SODIUM SCH MLS/HR: 2 INJECTION, POWDER, FOR SOLUTION INTRAMUSCULAR; INTRAVENOUS at 02:08

## 2020-09-18 RX ADMIN — AMPICILLIN SODIUM SCH MLS/HR: 2 INJECTION, POWDER, FOR SOLUTION INTRAMUSCULAR; INTRAVENOUS at 19:02

## 2020-09-18 RX ADMIN — SENNOSIDES SCH TAB: 8.6 TABLET, FILM COATED ORAL at 22:11

## 2020-09-18 RX ADMIN — AMPICILLIN SODIUM SCH MLS/HR: 2 INJECTION, POWDER, FOR SOLUTION INTRAMUSCULAR; INTRAVENOUS at 16:30

## 2020-09-18 RX ADMIN — Medication SCH MG: at 16:53

## 2020-09-18 RX ADMIN — AMPICILLIN SODIUM SCH MLS/HR: 2 INJECTION, POWDER, FOR SOLUTION INTRAMUSCULAR; INTRAVENOUS at 22:03

## 2020-09-18 RX ADMIN — SODIUM CHLORIDE SCH MLS/HR: 9 INJECTION, SOLUTION INTRAVENOUS at 20:03

## 2020-09-18 RX ADMIN — FAMOTIDINE SCH MG: 20 TABLET ORAL at 16:51

## 2020-09-18 RX ADMIN — TAMSULOSIN HYDROCHLORIDE SCH: 0.4 CAPSULE ORAL at 17:30

## 2020-09-18 RX ADMIN — FERROUS SULFATE TAB EC 324 MG (65 MG FE EQUIVALENT) SCH MG: 324 (65 FE) TABLET DELAYED RESPONSE at 16:52

## 2020-09-18 NOTE — PN
Progress Note, SLP





- Note


Progress Note: 





                                Selected Entries











  09/16/20 09/16/20 09/17/20





  15:00 18:00 02:00


 


Breakfast 25%  


 


Intake, Oral   





Amount   


 


Lunch 25%  


 


Supper  75% 


 


Temperature   98.1 F


 


Pulse Rate   89


 


Respiratory   20





Rate   


 


O2 Sat by Pulse   96





Oximetry (%)   














  09/17/20 09/17/20





  06:00 10:10


 


Breakfast  


 


Intake, Oral 0 





Amount  


 


Lunch  


 


Supper  


 


Temperature  


 


Pulse Rate 101 H 88


 


Respiratory  22 H





Rate  


 


O2 Sat by Pulse 98 97





Oximetry (%)  








                                Laboratory Tests











  09/15/20





  08:05


 


WBC  15.3 H





                                Selected Entries











  09/16/20 09/16/20 09/17/20





  15:00 18:00 02:00


 


Breakfast 25%  


 


Lunch 25%  


 


Supper  75% 


 


Temperature   98.1 F


 


Pulse Rate   89


 


Blood Pressure   138/74














  09/17/20 09/17/20





  06:00 10:10


 


Breakfast  


 


Lunch  


 


Supper  


 


Temperature  98.1 F


 


Pulse Rate 101 H 88


 


Blood Pressure 138/74 121/72





                                Selected Entries











  09/17/20 09/17/20 09/18/20





  15:00 19:51 06:00


 


Breakfast 25%  


 


Lunch 25%  


 


Supper  50% 


 


Pulse Rate   94 H


 


Blood Pressure   136/88














Tolerating puree/nectar.


Reviewed with RD-Dysphagia ground, edded finely minced tuna,  chicken salad no 

celery,  for improved po acceptance


Monitor tolerance


Repeat MBS next week for diet upgrade?

## 2020-09-18 NOTE — PN
Progress Note (short form)





- Note


Progress Note: 





no complaints                           





                                  Vital Signs











 Period  Temp  Pulse  Resp  BP Sys/Villa  Pulse Ox


 


 Last 24 Hr  97.8 F-98.5 F    20-20  100-140/56-95  86-98








cor-rrr


llungs decreased bs at bases


abd soft,mild suprpubic discomfort


ext no edema





                                    CBC, BMP





                                 09/15/20 08:05 





                                 09/16/20 07:40 





                                  Microbiology





09/10/20 09:37   Blood - Peripheral Venous   Blood Culture - Final


                            NO GROWTH AFTER 5 DAYS INCUBATION


09/09/20 17:15   Blood - Peripheral Venous   Blood Culture - Final


                            NO GROWTH AFTER 5 DAYS INCUBATION


09/09/20 17:30   Blood - Peripheral Venous   Blood Culture - Final


                            NO GROWTH AFTER 5 DAYS INCUBATION


09/09/20 22:25   Urine - Urine Swenson   Urine Culture - Final


                            Pseudomonas Aeruginosa


09/07/20 22:50   Blood - Peripheral Venous   Blood Culture - Final


                            Group D Strep Or Entero Coccus


                            Pseudomonas Species


09/07/20 22:50   Blood - Peripheral Venous   Blood Culture - Final


                            Enterococcus Faecalis


                            Pseudomonas Aeruginosa


09/08/20 00:27   Urine - Urine - Catheterized   Urine Culture - Final


                            Contaminated: Please Repeat

















imp/reccd





pseudomona/enterococcal bacteremia secondary to UTI- contineu 

cefepime/ampicillin


BPH with chronic swenson 


AS with bioprosthetic aortic valve


DNR/DNI


fecal impaction-laxatives


left hydronephrosis





clinically improved











continue amp/cefepime day #11 antibiotics

## 2020-09-18 NOTE — PN
Progress Note, Physician


History of Present Illness: 





Pt seen and examined at bedside. He appears comfortable. 





- Current Medication List


Current Medications: 


Active Medications





Acetaminophen (Tylenol -)  1,000 mg PO Q6H PRN


   PRN Reason: PAIN LEVEL 1-5


   Last Admin: 09/16/20 00:57 Dose:  1,000 mg


   Documented by: 


Albuterol Sulfate (Ventolin Hfa Inhaler -)  2 puff IH TID PRN


   PRN Reason: ASTHMA


Ascorbic Acid (Vitamin C -)  250 mg PO DAILY [facility]


   Last Admin: 09/17/20 10:04 Dose:  Not Given


   Documented by: 


Aspirin (Asa -)  81 mg PO DAILY [facility]


   Last Admin: 09/17/20 10:02 Dose:  Not Given


   Documented by: 


Atorvastatin Calcium (Lipitor -)  20 mg PO [facility]


   Last Admin: 09/17/20 22:29 Dose:  Not Given


   Documented by: 


Famotidine (Pepcid -)  20 mg PO DAILY [facility]


   Last Admin: 09/17/20 10:02 Dose:  Not Given


   Documented by: 


Ferrous Sulfate (Feosol -)  325 mg PO DAILY [facility]


   Last Admin: 09/17/20 10:02 Dose:  Not Given


   Documented by: 


Ampicillin Sodium 2 gm/ Sodium (Chloride)  100 mls @ 200 mls/hr IVPB Q4H-IV ELLEN;

Protocol


   Last Admin: 09/18/20 11:00 Dose:  200 mls/hr


   Documented by: 


Cefepime HCl 2 gm/ Dextrose  100 mls @ 200 mls/hr IVPB Q8H-IV ELLEN; Protocol


   Last Admin: 09/18/20 11:00 Dose:  200 mls/hr


   Documented by: 


Sodium Chloride (Normal Saline -)  1,000 mls @ 42 mls/hr IV ASDIR [facility]


   Last Admin: 09/17/20 19:57 Dose:  42 mls/hr


   Documented by: 


Levothyroxine Sodium (Synthroid -)  100 mcg PO ACBK [facility]


   Last Admin: 09/18/20 06:21 Dose:  100 mcg


   Documented by: 


Magnesium Hydroxide (Milk Of Magnesia -)  30 ml PO DAILY PRN


   PRN Reason: CONSTIPATION


Mirtazapine (Remeron -)  15 mg PO HS [facility]


   Last Admin: 09/17/20 22:29 Dose:  Not Given


   Documented by: 


Multivitamins/Minerals/Vitamin C (Tab-A-Vit -)  1 tab PO DAILY [facility]


   Last Admin: 09/17/20 10:02 Dose:  Not Given


   Documented by: 


Senna (Senna -)  2 tab PO HS [facility]


   Last Admin: 09/17/20 22:29 Dose:  Not Given


   Documented by: 


Tamsulosin HCl (Flomax -)  0.4 mg PO DAILY@0830 [facility]


   Last Admin: 09/17/20 10:02 Dose:  Not Given


   Documented by: 


Tramadol HCl (Ultram -)  50 mg PO Q8H PRN


   PRN Reason: PAIN LEVEL 6-10


   Last Admin: 09/18/20 06:29 Dose:  50 mg


   Documented by: 











- Objective


Vital Signs: 


                                   Vital Signs











Temperature  97.7 F   09/18/20 11:34


 


Pulse Rate  90   09/18/20 11:34


 


Respiratory Rate  90 H  09/18/20 11:34


 


Blood Pressure  117/78   09/18/20 11:34


 


O2 Sat by Pulse Oximetry (%)  96   09/18/20 11:34











Constitutional: Yes: Calm


Eyes: Yes: Conjunctiva Clear


HENT: Yes: Atraumatic


Neck: Yes: Supple


Cardiovascular: Yes: S1, S2


Respiratory: Yes: CTA Bilaterally


Gastrointestinal: Yes: Normal Bowel Sounds, Soft


Genitourinary: Yes: Incontinence


Musculoskeletal: Yes: WNL


Edema: No


Neurological: Yes: Oriented


Labs: 


                                    CBC, BMP





                                 09/18/20 07:35 





                                 09/18/20 07:25 





                                    INR, PTT











INR  0.95  (0.83-1.09)   09/07/20  22:50    














Problem List





- Problems


(1) JO ANN (acute kidney injury)


Code(s): N17.9 - ACUTE KIDNEY FAILURE, UNSPECIFIED   





(2) Hyperkalemia


Code(s): E87.5 - HYPERKALEMIA   





(3) Sepsis


Code(s): A41.9 - SEPSIS, UNSPECIFIED ORGANISM   





Assessment/Plan


                               Current Medications











Generic Name Dose Route Start Last Admin





  Trade Name Freq  PRN Reason Stop Dose Admin


 


Acetaminophen  1,000 mg  09/15/20 11:04  09/16/20 00:57





  Tylenol -  PO   1,000 mg





  Q6H PRN   Administration





  PAIN LEVEL 1-5  


 


Albuterol Sulfate  2 puff  09/08/20 01:38 





  Ventolin Hfa Inhaler -  IH  





  TID PRN  





  ASTHMA  


 


Ascorbic Acid  250 mg  09/08/20 10:00  09/17/20 10:04





  Vitamin C -  PO   Not Given





  DAILY [facility]  


 


Aspirin  81 mg  09/08/20 10:00  09/17/20 10:02





  Asa -  PO   Not Given





  DAILY [facility]  


 


Atorvastatin Calcium  20 mg  09/08/20 22:00  09/17/20 22:29





  Lipitor -  PO   Not Given





  HS [facility]  


 


Famotidine  20 mg  09/15/20 11:15  09/17/20 10:02





  Pepcid -  PO   Not Given





  DAILY [facility]  


 


Ferrous Sulfate  325 mg  09/08/20 10:00  09/17/20 10:02





  Feosol -  PO   Not Given





  DAILY [facility]  


 


Ampicillin Sodium 2 gm/ Sodium  100 mls @ 200 mls/hr  09/14/20 18:00  09/18/20 

11:00





  Chloride  IVPB   200 mls/hr





  Q4H-IV ELLEN   Administration





  Protocol  


 


Cefepime HCl 2 gm/ Dextrose  100 mls @ 200 mls/hr  09/14/20 14:00  09/18/20 

11:00





  IVPB   200 mls/hr





  Q8H-IV ELLEN   Administration





  Protocol  


 


Sodium Chloride  1,000 mls @ 42 mls/hr  09/16/20 12:30  09/17/20 19:57





  Normal Saline -  IV   42 mls/hr





  ASDIR ELLEN   Administration


 


Levothyroxine Sodium  100 mcg  09/08/20 07:00  09/18/20 06:21





  Synthroid -  PO   100 mcg





  ACBK [facility]   Administration


 


Magnesium Hydroxide  30 ml  09/18/20 11:09 





  Milk Of Magnesia -  PO  





  DAILY PRN  





  CONSTIPATION  


 


Mirtazapine  15 mg  09/17/20 22:00  09/17/20 22:29





  Remeron -  PO   Not Given





  HS [facility]  


 


Multivitamins/Minerals/Vitamin C  1 tab  09/12/20 10:15  09/17/20 10:02





  Tab-A-Vit -  PO   Not Given





  DAILY [facility]  


 


Senna  2 tab  09/15/20 22:00  09/17/20 22:29





  Senna -  PO   Not Given





  HS [facility]  


 


Tamsulosin HCl  0.4 mg  09/08/20 08:30  09/17/20 10:02





  Flomax -  PO   Not Given





  DAILY@0830 [facility]  


 


Tramadol HCl  50 mg  09/15/20 11:03  09/18/20 06:29





  Ultram -  PO   50 mg





  Q8H PRN   Administration





  PAIN LEVEL 6-10  














Impression


1. JO ANN


2. sepsis


3. hyperkalemia


4. hypotension


5. chf


6. bph


7. cad


8. tia


9. gerd


10. hypernatremia





Plan


- labs reviewed


- potassium improved


- monitor lytes


- stable off of fluis for now


- encourage po intake

## 2020-09-18 NOTE — PN
Progress Note, Physician


Chief Complaint: 





Sepsis


JO ANN


UTI


Bacteremia


Dysphagia


History of Present Illness: 





Mr. Lovelace is an 79 year old male BIBA from [facility] for 

hypotension (SBP in the 70's). He has a past medical history of hypertension, 

CHF, aortic stenosis s/p bioprosthetic valve replacement, CAD, prior stent, 

GERD,TIA,BPH and indwelling swenson. 





NAD


c/o fatigue


lethargic


Denies any pain or SOB


Afebrile


Seen by Psychiatry and evaluated by psychotherapis


Started on mirtazapine 15 mg po HS- which pt refused


Had Large BM yesterday





- Current Medication List


Current Medications: 


Active Medications





Acetaminophen (Tylenol -)  1,000 mg PO Q6H PRN


   PRN Reason: PAIN LEVEL 1-5


   Last Admin: 09/16/20 00:57 Dose:  1,000 mg


   Documented by: 


Albuterol Sulfate (Ventolin Hfa Inhaler -)  2 puff IH TID PRN


   PRN Reason: ASTHMA


Ascorbic Acid (Vitamin C -)  250 mg PO DAILY [facility]


   Last Admin: 09/17/20 10:04 Dose:  Not Given


   Documented by: 


Aspirin (Asa -)  81 mg PO DAILY [facility]


   Last Admin: 09/17/20 10:02 Dose:  Not Given


   Documented by: 


Atorvastatin Calcium (Lipitor -)  20 mg PO HS [facility]


   Last Admin: 09/17/20 22:29 Dose:  Not Given


   Documented by: 


Famotidine (Pepcid -)  20 mg PO DAILY [facility]


   Last Admin: 09/17/20 10:02 Dose:  Not Given


   Documented by: 


Ferrous Sulfate (Feosol -)  325 mg PO DAILY [facility]


   Last Admin: 09/17/20 10:02 Dose:  Not Given


   Documented by: 


Ampicillin Sodium 2 gm/ Sodium (Chloride)  100 mls @ 200 mls/hr IVPB Q4H-IV [facility];

Protocol


   Last Admin: 09/18/20 11:00 Dose:  200 mls/hr


   Documented by: 


Cefepime HCl 2 gm/ Dextrose  100 mls @ 200 mls/hr IVPB Q8H-IV [facility]; Protocol


   Last Admin: 09/18/20 11:00 Dose:  200 mls/hr


   Documented by: 


Sodium Chloride (Normal Saline -)  1,000 mls @ 42 mls/hr IV ASDIR [facility]


   Last Admin: 09/17/20 19:57 Dose:  42 mls/hr


   Documented by: 


Levothyroxine Sodium (Synthroid -)  100 mcg PO ACBK [facility]


   Last Admin: 09/18/20 06:21 Dose:  100 mcg


   Documented by: 


Mirtazapine (Remeron -)  15 mg PO [facility]


   Last Admin: 09/17/20 22:29 Dose:  Not Given


   Documented by: 


Multivitamins/Minerals/Vitamin C (Tab-A-Vit -)  1 tab PO DAILY [facility]


   Last Admin: 09/17/20 10:02 Dose:  Not Given


   Documented by: 


Senna (Senna -)  2 tab PO [facility]


   Last Admin: 09/17/20 22:29 Dose:  Not Given


   Documented by: 


Tamsulosin HCl (Flomax -)  0.4 mg PO DAILY@0830 [facility]


   Last Admin: 09/17/20 10:02 Dose:  Not Given


   Documented by: 


Tramadol HCl (Ultram -)  50 mg PO Q8H PRN


   PRN Reason: PAIN LEVEL 6-10


   Last Admin: 09/18/20 06:29 Dose:  50 mg


   Documented by: 











- Objective


Vital Signs: 


                                   Vital Signs











Temperature  98 F   09/17/20 22:18


 


Pulse Rate  94 H  09/18/20 06:00


 


Respiratory Rate  108 H  09/18/20 06:00


 


Blood Pressure  136/88   09/18/20 06:00


 


O2 Sat by Pulse Oximetry (%)  98   09/18/20 06:00











Constitutional: Yes: No Distress, Calm, Thin


Cardiovascular: Yes: Regular Rate and Rhythm


Respiratory: Yes: Regular, Diminished


Gastrointestinal: Yes: Normal Bowel Sounds, Soft


Genitourinary: Yes: Swenson Present


Musculoskeletal: Yes: Muscle Weakness


Extremities: Yes: WNL


Edema: No


Peripheral Pulses WNL: Yes


Neurological: Yes: Alert, Oriented


Psychiatric: Yes: Alert, Oriented


Labs: 


                                    CBC, BMP





                                 09/18/20 07:35 





                                 09/18/20 07:25 





                                    INR, PTT











INR  0.95  (0.83-1.09)   09/07/20  22:50    














Problem List





- Problems


(1) Chronic retention of urine


Assessment/Plan: 


-Continue swenson cath


Problems reviewed: Yes   


Code(s): R33.9 - RETENTION OF URINE, UNSPECIFIED   





(2) JO ANN (acute kidney injury)


Assessment/Plan: 


-Nephrology consult


-Renal U/S


-UC possible pseudomonas


-Cr trending down


-Continue IVF


Problems reviewed: Yes   


Code(s): N17.9 - ACUTE KIDNEY FAILURE, UNSPECIFIED   





(3) Hyperkalemia


Assessment/Plan: 


-resolved


Problems reviewed: Yes   


Code(s): E87.5 - HYPERKALEMIA   





(4) Sepsis


Assessment/Plan: 


-ID consult


-IV Ampicillin


-No Lactic acidosis


-Afebrile


-Cultures:


                                  Microbiology





09/10/20 09:37   Blood - Peripheral Venous   Blood Culture - Final


                            NO GROWTH AFTER 5 DAYS INCUBATION


09/09/20 17:15   Blood - Peripheral Venous   Blood Culture - Final


                            NO GROWTH AFTER 5 DAYS INCUBATION


09/09/20 17:30   Blood - Peripheral Venous   Blood Culture - Final


                            NO GROWTH AFTER 5 DAYS INCUBATION


09/09/20 22:25   Urine - Urine Swenson   Urine Culture - Final


                            Pseudomonas Aeruginosa


09/07/20 22:50   Blood - Peripheral Venous   Blood Culture - Final


                            Group D Strep Or Entero Coccus


                            Pseudomonas Species


09/07/20 22:50   Blood - Peripheral Venous   Blood Culture - Final


                            Enterococcus Faecalis


                            Pseudomonas Aeruginosa


09/08/20 00:27   Urine - Urine - Catheterized   Urine Culture - Final


                            Contaminated: Please Repeat











Problems reviewed: Yes   


Code(s): A41.9 - SEPSIS, UNSPECIFIED ORGANISM   





(5) Failure to thrive


Assessment/Plan: 


-Add multivitamin


-Ensure pudding + Magic cup


-Seen by Speech pathology


-MBS- Mod to severe stasis with puree. Aspiration on thin liquid


-Dysphagia puree thinned out with nectar thick liquids





Problems reviewed: Yes   


Code(s): XDY3875 -    





(6) Malnutrition


Assessment/Plan: 


-Multivitamin


-Mirtazapine


Problems reviewed: Yes   


Code(s): E46 - UNSPECIFIED PROTEIN-CALORIE MALNUTRITION   


Qualifiers: 


   Protein-calorie malnutrition severity: severe 





(7) Bacteremia


Assessment/Plan: 


-Cultures:


                                  Microbiology





09/10/20 09:37   Blood - Peripheral Venous   Blood Culture - Preliminary


                            NO GROWTH OBTAINED AFTER 48 HOURS, INCUBATION TO 

CONTINUE


                            FOR 3 DAYS.


09/09/20 17:30   Blood - Peripheral Venous   Blood Culture - Preliminary


                            NO GROWTH OBTAINED AFTER 48 HOURS, INCUBATION TO 

CONTINUE


                            FOR 3 DAYS.


09/09/20 17:15   Blood - Peripheral Venous   Blood Culture - Preliminary


                            NO GROWTH OBTAINED AFTER 48 HOURS, INCUBATION TO 

CONTINUE


                            FOR 3 DAYS.


09/09/20 22:25   Urine - Urine Swenson   Urine Culture - Preliminary


                            Presumptive Ps Aeruginosa


09/07/20 22:50   Blood - Peripheral Venous   Blood Culture - Final


                            Group D Strep Or Entero Coccus


                            Pseudomonas Species


09/07/20 22:50   Blood - Peripheral Venous   Blood Culture - Final


                            Enterococcus Faecalis


                            Pseudomonas Aeruginosa


09/08/20 00:27   Urine - Urine - Catheterized   Urine Culture - Final


                            Contaminated: Please Repeat





-rest as above


Problems reviewed: Yes   


Code(s): R78.81 - BACTEREMIA   





Assessment/Plan





See problem list

## 2020-09-19 LAB
ALBUMIN SERPL-MCNC: 1.4 G/DL (ref 3.4–5)
ALBUMIN SERPL-MCNC: 1.6 G/DL (ref 3.4–5)
ALP SERPL-CCNC: 151 U/L (ref 45–117)
ALP SERPL-CCNC: 173 U/L (ref 45–117)
ALT SERPL-CCNC: 13 U/L (ref 13–61)
ALT SERPL-CCNC: 15 U/L (ref 13–61)
ANION GAP SERPL CALC-SCNC: 6 MMOL/L (ref 8–16)
ANION GAP SERPL CALC-SCNC: 6 MMOL/L (ref 8–16)
AST SERPL-CCNC: 51 U/L (ref 15–37)
AST SERPL-CCNC: 56 U/L (ref 15–37)
BASOPHILS # BLD: 0.9 % (ref 0–2)
BILIRUB SERPL-MCNC: 0.4 MG/DL (ref 0.2–1)
BILIRUB SERPL-MCNC: 1.1 MG/DL (ref 0.2–1)
BUN SERPL-MCNC: 22.6 MG/DL (ref 7–18)
BUN SERPL-MCNC: 23.4 MG/DL (ref 7–18)
CALCIUM SERPL-MCNC: 7.2 MG/DL (ref 8.5–10.1)
CALCIUM SERPL-MCNC: 7.5 MG/DL (ref 8.5–10.1)
CHLORIDE SERPL-SCNC: 108 MMOL/L (ref 98–107)
CHLORIDE SERPL-SCNC: 110 MMOL/L (ref 98–107)
CHOLEST SERPL-MCNC: 107 MG/DL (ref 50–200)
CO2 SERPL-SCNC: 22 MMOL/L (ref 21–32)
CO2 SERPL-SCNC: 23 MMOL/L (ref 21–32)
CREAT SERPL-MCNC: 1.4 MG/DL (ref 0.55–1.3)
CREAT SERPL-MCNC: 1.4 MG/DL (ref 0.55–1.3)
DEPRECATED RDW RBC AUTO: 18 % (ref 11.9–15.9)
EOSINOPHIL # BLD: 1.1 % (ref 0–4.5)
GLUCOSE SERPL-MCNC: 73 MG/DL (ref 74–106)
GLUCOSE SERPL-MCNC: 76 MG/DL (ref 74–106)
HCT VFR BLD CALC: 31 % (ref 35.4–49)
HDLC SERPL-MCNC: 27 MG/DL (ref 40–60)
HGB BLD-MCNC: 10.1 GM/DL (ref 11.7–16.9)
LDLC SERPL CALC-MCNC: 73 MG/DL (ref 5–100)
LYMPHOCYTES # BLD: 8.1 % (ref 8–40)
MCH RBC QN AUTO: 26.8 PG (ref 25.7–33.7)
MCHC RBC AUTO-ENTMCNC: 32.6 G/DL (ref 32–35.9)
MCV RBC: 82.4 FL (ref 80–96)
MONOCYTES # BLD AUTO: 7.5 % (ref 3.8–10.2)
NEUTROPHILS # BLD: 82.4 % (ref 42.8–82.8)
PLATELET # BLD AUTO: 313 K/MM3 (ref 134–434)
PMV BLD: 8.2 FL (ref 7.5–11.1)
POTASSIUM SERPLBLD-SCNC: 4.9 MMOL/L (ref 3.5–5.1)
POTASSIUM SERPLBLD-SCNC: 5 MMOL/L (ref 3.5–5.1)
PROT SERPL-MCNC: 5.5 G/DL (ref 6.4–8.2)
PROT SERPL-MCNC: 6.3 G/DL (ref 6.4–8.2)
RBC # BLD AUTO: 3.75 M/MM3 (ref 4–5.6)
SODIUM SERPL-SCNC: 137 MMOL/L (ref 136–145)
SODIUM SERPL-SCNC: 138 MMOL/L (ref 136–145)
TRIGL SERPL-MCNC: 85 MG/DL (ref 0–150)
WBC # BLD AUTO: 10.7 K/MM3 (ref 4–10)

## 2020-09-19 RX ADMIN — FERROUS SULFATE TAB EC 324 MG (65 MG FE EQUIVALENT) SCH MG: 324 (65 FE) TABLET DELAYED RESPONSE at 11:10

## 2020-09-19 RX ADMIN — MULTIVITAMIN TABLET SCH TAB: TABLET at 11:11

## 2020-09-19 RX ADMIN — AMPICILLIN SODIUM SCH MLS/HR: 2 INJECTION, POWDER, FOR SOLUTION INTRAMUSCULAR; INTRAVENOUS at 17:17

## 2020-09-19 RX ADMIN — AMPICILLIN SODIUM SCH MLS/HR: 2 INJECTION, POWDER, FOR SOLUTION INTRAMUSCULAR; INTRAVENOUS at 06:24

## 2020-09-19 RX ADMIN — LEVOTHYROXINE SODIUM SCH MCG: 100 TABLET ORAL at 06:23

## 2020-09-19 RX ADMIN — CEFEPIME HYDROCHLORIDE SCH MLS/HR: 2 INJECTION, POWDER, FOR SOLUTION INTRAVENOUS at 17:17

## 2020-09-19 RX ADMIN — CEFEPIME HYDROCHLORIDE SCH MLS/HR: 2 INJECTION, POWDER, FOR SOLUTION INTRAVENOUS at 01:01

## 2020-09-19 RX ADMIN — AMPICILLIN SODIUM SCH MLS/HR: 2 INJECTION, POWDER, FOR SOLUTION INTRAMUSCULAR; INTRAVENOUS at 21:50

## 2020-09-19 RX ADMIN — MIRTAZAPINE SCH MG: 15 TABLET, FILM COATED ORAL at 21:51

## 2020-09-19 RX ADMIN — FAMOTIDINE SCH MG: 20 TABLET ORAL at 11:11

## 2020-09-19 RX ADMIN — TAMSULOSIN HYDROCHLORIDE SCH MG: 0.4 CAPSULE ORAL at 11:10

## 2020-09-19 RX ADMIN — CEFEPIME HYDROCHLORIDE SCH MLS/HR: 2 INJECTION, POWDER, FOR SOLUTION INTRAVENOUS at 11:10

## 2020-09-19 RX ADMIN — AMPICILLIN SODIUM SCH MLS/HR: 2 INJECTION, POWDER, FOR SOLUTION INTRAMUSCULAR; INTRAVENOUS at 02:13

## 2020-09-19 RX ADMIN — AMPICILLIN SODIUM SCH MLS/HR: 2 INJECTION, POWDER, FOR SOLUTION INTRAMUSCULAR; INTRAVENOUS at 13:11

## 2020-09-19 RX ADMIN — NITROGLYCERIN SCH: 20 OINTMENT TOPICAL at 17:17

## 2020-09-19 RX ADMIN — AMPICILLIN SODIUM SCH MLS/HR: 2 INJECTION, POWDER, FOR SOLUTION INTRAMUSCULAR; INTRAVENOUS at 11:10

## 2020-09-19 RX ADMIN — Medication SCH MG: at 11:11

## 2020-09-19 RX ADMIN — ATORVASTATIN CALCIUM SCH MG: 40 TABLET, FILM COATED ORAL at 21:51

## 2020-09-19 RX ADMIN — SODIUM CHLORIDE SCH MLS/HR: 9 INJECTION, SOLUTION INTRAVENOUS at 05:45

## 2020-09-19 RX ADMIN — SENNOSIDES SCH TAB: 8.6 TABLET, FILM COATED ORAL at 21:51

## 2020-09-19 RX ADMIN — ASPIRIN 81 MG SCH MG: 81 TABLET ORAL at 11:10

## 2020-09-19 NOTE — CON.CARD
Consult


Consult Specialty:: Cardiology


Referred by:: Rangel


Reason for Consultation:: elevated troponin





- History of Present Illness


Chief Complaint: sepsis


History of Present Illness: 


79 year old male with a history of HTN, CHF, aortic stenosis, CAD, GERD, TIA, 

BPH on indwelling swenson and hypothyroidism, admitted from [facility] for 

hypotension, elevated wbc, in acute renal failure. He was started on IVF to 

which he responded and IV antibiotics, found to have UTI and grp D strep and 

NLFGNB bacteremia. Noted today with elevated troponin but no chest pain, sob. 


ECG without changes.











- History Source


History Provided By: Medical Record





- Past Medical History


Cardio/Vascular: Yes: CHF, HTN





- Smoking History


Smoking history: Never smoked


Have you smoked in the past 12 months: No





- Social History


Usual Living Arrangement: Nursing Home





Home Medications





- Allergies


Allergies/Adverse Reactions: 


                                    Allergies











Allergy/AdvReac Type Severity Reaction Status Date / Time


 


No Known Allergies Allergy   Verified 09/07/20 22:00














- Home Medications


Home Medications: 


Ambulatory Orders





Atorvastatin Ca [Lipitor] 20 mg PO HS 08/02/20 


Ergocalciferol (Vitamin D2) [Vitamin D2] 50,000 unit PO WEEKLY 08/02/20 


Ferrous Sulfate 325 mg PO DAILY 08/02/20 


Levothyroxine [Synthroid -] 100 mcg PO DAILY 08/02/20 


Tamsulosin HCl 0.4 mg PO DAILY 08/02/20 


Ascorbic Acid [Vitamin C -] 250 mg PO DAILY  tablet 08/06/20 


Aspirin [ASA -] 81 mg PO DAILY  tab.chew 08/06/20 


Carvedilol [Coreg -] 3.125 mg PO BID  tablet 08/06/20 


Docusate Sodium [Colace -] 100 mg PO BID  capsule 08/06/20 


Furosemide [Lasix -] 40 mg PO DAILY  tablet 08/06/20 


Pantoprazole Sodium [Protonix -] 20 mg PO DAILY  tablet.ec 08/06/20 


Acetaminophen 650 mg PO QID PRN 09/07/20 


Albuterol Sulfate [Proventil Hfa] 6.7 gm IH TID PRN 09/07/20 


Mag Hydrox/Aluminum Hyd/Simeth [Maalox Advanced Suspension] 30 ml PO BID PRN 

09/07/20 


Melatonin 3 mg PO HS 09/07/20 











Family Medical History


Family History: Denies


Vital Signs: 


                                   Vital Signs











Temperature  98.8 F   09/19/20 09:00


 


Pulse Rate  82   09/19/20 09:00


 


Respiratory Rate  19   09/19/20 09:00


 


Blood Pressure  108/68   09/19/20 09:00


 


O2 Sat by Pulse Oximetry (%)  100   09/19/20 09:00














- Other Data


Labs, Other Data: 


                                    CBC, BMP





                                 09/18/20 07:35 





                                 09/19/20 04:00 





                                    INR, PTT











INR  0.95  (0.83-1.09)   09/07/20  22:50    








                                  Troponin, BNP











  09/19/20





  04:00


 


Troponin I  3.27 H*








                                  Troponin, BNP











  09/19/20





  04:00


 


Troponin I  3.27 H*














Assessment/Plan


79 year old male with a history of HTN, CHF, aortic stenosis, CAD, GERD, TIA, 

BPH on indwelling swenson and hypothyroidism, admitted from [facility] for 

hypotension, elevated wbc, in acute renal failure. He was started on IVF to 

which he responded and IV antibiotics, found to have UTI and grp D strep and 

NLFGNB bacteremia. Noted today with elevated troponin but no chest pain, sob. 


ECG without changes.

## 2020-09-19 NOTE — PN
Progress Note (short form)





- Note


Progress Note: 





call from RN that pt having Chest pain. EKG ordered and reviewed. QTc 

prolongation noted. no obvious ST changes compared to prior





non diaphoretic. NAD 


tachycardic. + S1S2 no mrg. CTA b/l . abdomen NTND. no LE edema 





2g Mg ordered





STAT trop, cmp, cbc





trop resulted 3.27


transfer to tele





call out to Dr. Oliva





will start heparin drip

## 2020-09-19 NOTE — PN
Progress Note, Physician


History of Present Illness: 





Sepsis


JO ANN


UTI


Bacteremia


Dysphagia


History of Present Illness: 





Mr. Lovelace is an 79 year old male BIBA from [facility] for 

hypotension (SBP in the 70's). He has a past medical history of hypertension, 

CHF, aortic stenosis s/p bioprosthetic valve replacement, CAD, prior stent, 

GERD,TIA,BPH and indwelling swenson. 





NAD


c/o fatigue


lethargic


Denies any pain or SOB


Afebrile


Seen by Psychiatry and evaluated by psychotherapis


Started on mirtazapine 15 mg po HS- which pt refused





chest pain last night transferred to telemetry


deniea pain at this time





- Current Medication List


Current Medications: 


Active Medications





Acetaminophen (Tylenol -)  1,000 mg PO Q6H PRN


   PRN Reason: PAIN LEVEL 1-5


Albuterol Sulfate (Ventolin Hfa Inhaler -)  2 puff IH TID PRN


   PRN Reason: ASTHMA


Ascorbic Acid (Vitamin C -)  250 mg PO DAILY [facility]


   Last Admin: 09/19/20 11:11 Dose:  250 mg


   Documented by: 


Aspirin (Asa -)  81 mg PO DAILY [facility]


   Last Admin: 09/19/20 11:10 Dose:  81 mg


   Documented by: 


Famotidine (Pepcid -)  20 mg PO DAILY [facility]


   Last Admin: 09/19/20 11:11 Dose:  20 mg


   Documented by: 


Ferrous Sulfate (Feosol -)  325 mg PO DAILY [facility]


   Last Admin: 09/19/20 11:10 Dose:  325 mg


   Documented by: 


Ampicillin Sodium 2 gm/ Sodium (Chloride)  100 mls @ 200 mls/hr IVPB Q4H-IV [facility];

Protocol


   Last Admin: 09/19/20 11:10 Dose:  200 mls/hr


   Documented by: 


Cefepime HCl 2 gm/ Dextrose  100 mls @ 200 mls/hr IVPB Q8H-IV [facility]; Protocol


   Last Admin: 09/19/20 11:10 Dose:  200 mls/hr


   Documented by: 


Sodium Chloride (Normal Saline -)  1,000 mls @ 42 mls/hr IV ASDIR [facility]


   Last Admin: 09/19/20 05:45 Dose:  42 mls/hr


   Documented by: 


Levothyroxine Sodium (Synthroid -)  100 mcg PO ACBK [facility]


   Last Admin: 09/19/20 06:23 Dose:  100 mcg


   Documented by: 


Magnesium Hydroxide (Milk Of Magnesia -)  30 ml PO DAILY [facility]


   PRN Reason: CONSTIPATION


Mirtazapine (Remeron -)  15 mg PO HS [facility]


Morphine Sulfate (Morphine Sulfate)  1 mg IVPUSH Q4H PRN


   PRN Reason: PAIN LEVEL 7 - 10


Multivitamins/Minerals/Vitamin C (Tab-A-Vit -)  1 tab PO DAILY [facility]


   Last Admin: 09/19/20 11:11 Dose:  1 tab


   Documented by: 


Senna (Senna -)  2 tab PO HS [facility]


Tamsulosin HCl (Flomax -)  0.4 mg PO DAILY@0830 [facility]


   Last Admin: 09/19/20 11:10 Dose:  0.4 mg


   Documented by: 


Tramadol HCl (Ultram -)  50 mg PO Q8H PRN


   PRN Reason: PAIN LEVEL 6-10











- Objective


Vital Signs: 


                                   Vital Signs











Temperature  98.8 F   09/19/20 09:00


 


Pulse Rate  82   09/19/20 09:00


 


Respiratory Rate  19   09/19/20 09:00


 


Blood Pressure  108/68   09/19/20 09:00


 


O2 Sat by Pulse Oximetry (%)  100   09/19/20 09:00











Cardiovascular: Yes: S1, S2


Respiratory: Yes: Regular, CTA Bilaterally


Gastrointestinal: Yes: Normal Bowel Sounds, Soft


Edema: No


Labs: 


                                    CBC, BMP





                                 09/19/20 04:00 





                                    INR, PTT











INR  0.95  (0.83-1.09)   09/07/20  22:50    














Problem List





- Problems


(1) Dysphagia


Code(s): R13.10 - DYSPHAGIA, UNSPECIFIED   





(2) Weakness


Code(s): R53.1 - WEAKNESS   





(3) JO ANN (acute kidney injury)


Code(s): N17.9 - ACUTE KIDNEY FAILURE, UNSPECIFIED   





(4) Chronic retention of urine


Code(s): R33.9 - RETENTION OF URINE, UNSPECIFIED   





(5) Sepsis


Code(s): A41.9 - SEPSIS, UNSPECIFIED ORGANISM   





Assessment/Plan





- Problems


(1) Chronic retention of urine


Assessment/Plan: 


-Continue swenson cath


Problems reviewed: Yes   


Code(s): R33.9 - RETENTION OF URINE, UNSPECIFIED   





(2) JO ANN (acute kidney injury)


Assessment/Plan: 


-Nephrology consult


-Renal U/S


-UC possible pseudomonas


-Cr trending down


-Continue IVF


Problems reviewed: Yes   


Code(s): N17.9 - ACUTE KIDNEY FAILURE, UNSPECIFIED   





(3) Chest Pain with rise in troponin


Assessment/Plan: 


-cardio


-tele


Orders





09/19/20 10:00


Aspirin [ASA -]   81 mg PO DAILY 








                                Laboratory Tests











  09/19/20 09/19/20





  04:00 10:45


 


Troponin I  3.27 H*  2.18 H*








add lipitor





(4) Sepsis


Assessment/Plan: 


-ID consult


-IV Ampicillin


-No Lactic acidosis


-Afebrile


-Cultures:


                                  Microbiology





09/10/20 09:37   Blood - Peripheral Venous   Blood Culture - Final


                            NO GROWTH AFTER 5 DAYS INCUBATION


09/09/20 17:15   Blood - Peripheral Venous   Blood Culture - Final


                            NO GROWTH AFTER 5 DAYS INCUBATION


09/09/20 17:30   Blood - Peripheral Venous   Blood Culture - Final


                            NO GROWTH AFTER 5 DAYS INCUBATION


09/09/20 22:25   Urine - Urine Swenson   Urine Culture - Final


                            Pseudomonas Aeruginosa


09/07/20 22:50   Blood - Peripheral Venous   Blood Culture - Final


                            Group D Strep Or Entero Coccus


                            Pseudomonas Species


09/07/20 22:50   Blood - Peripheral Venous   Blood Culture - Final


                            Enterococcus Faecalis


                            Pseudomonas Aeruginosa


09/08/20 00:27   Urine - Urine - Catheterized   Urine Culture - Final


                            Contaminated: Please Repeat











Problems reviewed: Yes   


Code(s): A41.9 - SEPSIS, UNSPECIFIED ORGANISM   





(5) Failure to thrive


Assessment/Plan: 


-Add multivitamin


-Ensure pudding + Magic cup


-Seen by Speech pathology


-MBS- Mod to severe stasis with puree. Aspiration on thin liquid


-Dysphagia puree thinned out with nectar thick liquids





Problems reviewed: Yes   


Code(s): [id number] -    





(6) Malnutrition


Assessment/Plan: 


-Multivitamin


-Mirtazapine


Problems reviewed: Yes   


Code(s): E46 - UNSPECIFIED PROTEIN-CALORIE MALNUTRITION   


Qualifiers: 


   Protein-calorie malnutrition severity: severe 





(7) Bacteremia


Assessment/Plan: 


-Cultures:


                                  Microbiology





09/10/20 09:37   Blood - Peripheral Venous   Blood Culture - Preliminary


                            NO GROWTH OBTAINED AFTER 48 HOURS, INCUBATION TO 

CONTINUE


                            FOR 3 DAYS.


09/09/20 17:30   Blood - Peripheral Venous   Blood Culture - Preliminary


                            NO GROWTH OBTAINED AFTER 48 HOURS, INCUBATION TO 

CONTINUE


                            FOR 3 DAYS.


09/09/20 17:15   Blood - Peripheral Venous   Blood Culture - Preliminary


                            NO GROWTH OBTAINED AFTER 48 HOURS, INCUBATION TO C

ONTINUE


                            FOR 3 DAYS.


09/09/20 22:25   Urine - Urine Swenson   Urine Culture - Preliminary


                            Presumptive Ps Aeruginosa


09/07/20 22:50   Blood - Peripheral Venous   Blood Culture - Final


                            Group D Strep Or Entero Coccus


                            Pseudomonas Species


09/07/20 22:50   Blood - Peripheral Venous   Blood Culture - Final


                            Enterococcus Faecalis


                            Pseudomonas Aeruginosa


09/08/20 00:27   Urine - Urine - Catheterized   Urine Culture - Final


                            Contaminated: Please Repeat





-rest as above


Problems reviewed: Yes   


Code(s): R78.81 - BACTEREMIA

## 2020-09-19 NOTE — PN
Progress Note (short form)





- Note


Progress Note: 


called at 5:14 am about elevated troponin.


The patient has been seen by Dr Simms and his group as recently as August. 

Will change consult order.

## 2020-09-20 LAB
ALBUMIN SERPL-MCNC: 1.5 G/DL (ref 3.4–5)
ALP SERPL-CCNC: 148 U/L (ref 45–117)
ALT SERPL-CCNC: 14 U/L (ref 13–61)
ANION GAP SERPL CALC-SCNC: 6 MMOL/L (ref 8–16)
AST SERPL-CCNC: 49 U/L (ref 15–37)
BASOPHILS # BLD: 0.9 % (ref 0–2)
BILIRUB SERPL-MCNC: 0.9 MG/DL (ref 0.2–1)
BUN SERPL-MCNC: 21.8 MG/DL (ref 7–18)
CALCIUM SERPL-MCNC: 7.4 MG/DL (ref 8.5–10.1)
CHLORIDE SERPL-SCNC: 110 MMOL/L (ref 98–107)
CO2 SERPL-SCNC: 21 MMOL/L (ref 21–32)
CREAT SERPL-MCNC: 1.4 MG/DL (ref 0.55–1.3)
DEPRECATED RDW RBC AUTO: 18.3 % (ref 11.9–15.9)
EOSINOPHIL # BLD: 1.4 % (ref 0–4.5)
GLUCOSE SERPL-MCNC: 70 MG/DL (ref 74–106)
HCT VFR BLD CALC: 31.7 % (ref 35.4–49)
HGB BLD-MCNC: 10.1 GM/DL (ref 11.7–16.9)
LYMPHOCYTES # BLD: 6.3 % (ref 8–40)
MCH RBC QN AUTO: 26 PG (ref 25.7–33.7)
MCHC RBC AUTO-ENTMCNC: 31.9 G/DL (ref 32–35.9)
MCV RBC: 81.5 FL (ref 80–96)
MONOCYTES # BLD AUTO: 6.8 % (ref 3.8–10.2)
NEUTROPHILS # BLD: 84.6 % (ref 42.8–82.8)
PLATELET # BLD AUTO: 302 K/MM3 (ref 134–434)
PMV BLD: 8.1 FL (ref 7.5–11.1)
POTASSIUM SERPLBLD-SCNC: 5.3 MMOL/L (ref 3.5–5.1)
PROT SERPL-MCNC: 5.8 G/DL (ref 6.4–8.2)
RBC # BLD AUTO: 3.89 M/MM3 (ref 4–5.6)
SODIUM SERPL-SCNC: 136 MMOL/L (ref 136–145)
WBC # BLD AUTO: 11.7 K/MM3 (ref 4–10)

## 2020-09-20 RX ADMIN — TAMSULOSIN HYDROCHLORIDE SCH MG: 0.4 CAPSULE ORAL at 09:00

## 2020-09-20 RX ADMIN — CEFEPIME HYDROCHLORIDE SCH MLS/HR: 2 INJECTION, POWDER, FOR SOLUTION INTRAVENOUS at 18:27

## 2020-09-20 RX ADMIN — FAMOTIDINE SCH MG: 20 TABLET ORAL at 09:01

## 2020-09-20 RX ADMIN — CEFEPIME HYDROCHLORIDE SCH MLS/HR: 2 INJECTION, POWDER, FOR SOLUTION INTRAVENOUS at 01:03

## 2020-09-20 RX ADMIN — CEFEPIME HYDROCHLORIDE SCH MLS/HR: 2 INJECTION, POWDER, FOR SOLUTION INTRAVENOUS at 09:02

## 2020-09-20 RX ADMIN — NITROGLYCERIN SCH: 20 OINTMENT TOPICAL at 00:38

## 2020-09-20 RX ADMIN — NITROGLYCERIN SCH: 20 OINTMENT TOPICAL at 06:17

## 2020-09-20 RX ADMIN — AMPICILLIN SODIUM SCH MLS/HR: 2 INJECTION, POWDER, FOR SOLUTION INTRAMUSCULAR; INTRAVENOUS at 01:05

## 2020-09-20 RX ADMIN — ATORVASTATIN CALCIUM SCH: 40 TABLET, FILM COATED ORAL at 21:41

## 2020-09-20 RX ADMIN — LEVOTHYROXINE SODIUM SCH: 100 TABLET ORAL at 06:40

## 2020-09-20 RX ADMIN — MULTIVITAMIN TABLET SCH TAB: TABLET at 09:03

## 2020-09-20 RX ADMIN — ASPIRIN 81 MG SCH MG: 81 TABLET ORAL at 09:01

## 2020-09-20 RX ADMIN — NITROGLYCERIN SCH: 20 OINTMENT TOPICAL at 18:00

## 2020-09-20 RX ADMIN — SENNOSIDES SCH: 8.6 TABLET, FILM COATED ORAL at 21:41

## 2020-09-20 RX ADMIN — NITROGLYCERIN SCH: 20 OINTMENT TOPICAL at 11:57

## 2020-09-20 RX ADMIN — AMPICILLIN SODIUM SCH MLS/HR: 2 INJECTION, POWDER, FOR SOLUTION INTRAMUSCULAR; INTRAVENOUS at 18:32

## 2020-09-20 RX ADMIN — AMPICILLIN SODIUM SCH MLS/HR: 2 INJECTION, POWDER, FOR SOLUTION INTRAMUSCULAR; INTRAVENOUS at 06:17

## 2020-09-20 RX ADMIN — MIRTAZAPINE SCH: 15 TABLET, FILM COATED ORAL at 21:41

## 2020-09-20 RX ADMIN — FERROUS SULFATE TAB EC 324 MG (65 MG FE EQUIVALENT) SCH MG: 324 (65 FE) TABLET DELAYED RESPONSE at 09:01

## 2020-09-20 RX ADMIN — Medication SCH MG: at 09:01

## 2020-09-20 RX ADMIN — SODIUM CHLORIDE SCH MLS/HR: 9 INJECTION, SOLUTION INTRAVENOUS at 06:17

## 2020-09-20 RX ADMIN — AMPICILLIN SODIUM SCH MLS/HR: 2 INJECTION, POWDER, FOR SOLUTION INTRAMUSCULAR; INTRAVENOUS at 22:45

## 2020-09-20 RX ADMIN — AMPICILLIN SODIUM SCH MLS/HR: 2 INJECTION, POWDER, FOR SOLUTION INTRAMUSCULAR; INTRAVENOUS at 09:01

## 2020-09-20 RX ADMIN — AMPICILLIN SODIUM SCH MLS/HR: 2 INJECTION, POWDER, FOR SOLUTION INTRAMUSCULAR; INTRAVENOUS at 15:05

## 2020-09-20 NOTE — PN
Progress Note, Physician





- Current Medication List


Current Medications: 


Active Medications





Acetaminophen (Tylenol -)  1,000 mg PO Q6H PRN


   PRN Reason: PAIN LEVEL 1-5


Albuterol Sulfate (Ventolin Hfa Inhaler -)  2 puff IH TID PRN


   PRN Reason: ASTHMA


Ascorbic Acid (Vitamin C -)  250 mg PO DAILY [facility]


   Last Admin: 09/20/20 09:01 Dose:  250 mg


   Documented by: 


Aspirin (Asa -)  81 mg PO DAILY [facility]


   Last Admin: 09/20/20 09:01 Dose:  81 mg


   Documented by: 


Atorvastatin Calcium (Lipitor -)  40 mg PO [facility]


   Last Admin: 09/19/20 21:51 Dose:  40 mg


   Documented by: 


Famotidine (Pepcid -)  20 mg PO DAILY [facility]


   Last Admin: 09/20/20 09:01 Dose:  20 mg


   Documented by: 


Ferrous Sulfate (Feosol -)  325 mg PO DAILY [facility]


   Last Admin: 09/20/20 09:01 Dose:  325 mg


   Documented by: 


Ampicillin Sodium 2 gm/ Sodium (Chloride)  100 mls @ 200 mls/hr IVPB Q4H-IV [facility];

Protocol


   Last Admin: 09/20/20 09:01 Dose:  200 mls/hr


   Documented by: 


Cefepime HCl 2 gm/ Dextrose  100 mls @ 200 mls/hr IVPB Q8H-IV [facility]; Protocol


   Last Admin: 09/20/20 09:02 Dose:  200 mls/hr


   Documented by: 


Sodium Chloride (Normal Saline -)  1,000 mls @ 42 mls/hr IV ASDIR [facility]


   Last Admin: 09/20/20 06:17 Dose:  42 mls/hr


   Documented by: 


Levothyroxine Sodium (Synthroid -)  100 mcg PO ACBK [facility]


   Last Admin: 09/20/20 06:40 Dose:  Not Given


   Documented by: 


Magnesium Hydroxide (Milk Of Magnesia -)  30 ml PO DAILY PRN


   PRN Reason: CONSTIPATION


Mirtazapine (Remeron -)  15 mg PO [facility]


   Last Admin: 09/19/20 21:51 Dose:  15 mg


   Documented by: 


Morphine Sulfate (Morphine Sulfate)  1 mg IVPUSH Q4H PRN


   PRN Reason: PAIN LEVEL 7 - 10


Multivitamins/Minerals/Vitamin C (Tab-A-Vit -)  1 tab PO DAILY [facility]


   Last Admin: 09/20/20 09:03 Dose:  1 tab


   Documented by: 


Nitroglycerin (Nitro-Bid 2% Paste -)  0.5 inch TD Q6H [facility]


   Last Admin: 09/20/20 06:17 Dose:  Not Given


   Documented by: 


Senna (Senna -)  2 tab PO HS [facility]


   Last Admin: 09/19/20 21:51 Dose:  2 tab


   Documented by: 


Tamsulosin HCl (Flomax -)  0.4 mg PO DAILY@0830 [facility]


   Last Admin: 09/20/20 09:00 Dose:  0.4 mg


   Documented by: 


Tramadol HCl (Ultram -)  50 mg PO Q8H PRN


   PRN Reason: PAIN LEVEL 6-10











- Objective


Vital Signs: 


                                   Vital Signs











Temperature  98.4 F   09/20/20 06:00


 


Pulse Rate  88   09/20/20 06:00


 


Respiratory Rate  16   09/20/20 06:00


 


Blood Pressure  143/96   09/20/20 06:00


 


O2 Sat by Pulse Oximetry (%)  98   09/20/20 06:00











Labs: 


                                    CBC, BMP





                                 09/20/20 06:00 





                                 09/20/20 06:00 





                                    INR, PTT











INR  0.95  (0.83-1.09)   09/07/20  22:50    














Problem List





- Problems


(1) Dysphagia


Code(s): R13.10 - DYSPHAGIA, UNSPECIFIED   





(2) Weakness


Code(s): R53.1 - WEAKNESS   





(3) JO ANN (acute kidney injury)


Code(s): N17.9 - ACUTE KIDNEY FAILURE, UNSPECIFIED   





(4) Chronic retention of urine


Code(s): R33.9 - RETENTION OF URINE, UNSPECIFIED   





(5) Sepsis


Code(s): A41.9 - SEPSIS, UNSPECIFIED ORGANISM

## 2020-09-20 NOTE — EKG
Test Reason : 

Blood Pressure : ***/*** mmHG

Vent. Rate : 084 BPM     Atrial Rate : 084 BPM

   P-R Int : 208 ms          QRS Dur : 174 ms

    QT Int : 456 ms       P-R-T Axes : 000 -84 077 degrees

   QTc Int : 538 ms

 

SINUS RHYTHM WITH PREMATURE SUPRAVENTRICULAR COMPLEXES

LEFT AXIS DEVIATION

RIGHT BUNDLE BRANCH BLOCK

LATERAL INFARCT (CITED ON OR BEFORE 02-AUG-2020)

INFERIOR INFARCT (CITED ON OR BEFORE 02-AUG-2020)

ABNORMAL ECG

WHEN COMPARED WITH ECG OF 19-SEP-2020 04:09,

PREMATURE SUPRAVENTRICULAR COMPLEXES ARE NOW PRESENT

Confirmed by Trisha Neves ([id number]) on 9/20/2020 9:31:09 AM

 

Referred By:             Confirmed By:Trisha Neves

## 2020-09-20 NOTE — PN
Progress Note (short form)





- Note


Progress Note: 





events noted


transferred to monitored bed for chest pain positive troponins


awaiting cardiology evaluation


currently no chest pain








                                  Vital Signs











 Period  Temp  Pulse  Resp  BP Sys/Villa  Pulse Ox


 


 Last 24 Hr  98.0 F-98.6 F  74-92  14-21  /70-96  








cor-rrr


lungs decreased bs at bases


abd soft,nt


+swenson


ext no edema





                                    CBC, BMP





                                 09/20/20 06:00 





                                 09/20/20 06:00 





                                  Microbiology





09/10/20 09:37   Blood - Peripheral Venous   Blood Culture - Final


                            NO GROWTH AFTER 5 DAYS INCUBATION


09/09/20 17:15   Blood - Peripheral Venous   Blood Culture - Final


                            NO GROWTH AFTER 5 DAYS INCUBATION


09/09/20 17:30   Blood - Peripheral Venous   Blood Culture - Final


                            NO GROWTH AFTER 5 DAYS INCUBATION


09/09/20 22:25   Urine - Urine Swenson   Urine Culture - Final


                            Pseudomonas Aeruginosa


09/07/20 22:50   Blood - Peripheral Venous   Blood Culture - Final


                            Group D Strep Or Entero Coccus


                            Pseudomonas Species


09/07/20 22:50   Blood - Peripheral Venous   Blood Culture - Final


                            Enterococcus Faecalis


                            Pseudomonas Aeruginosa


09/08/20 00:27   Urine - Urine - Catheterized   Urine Culture - Final


                            Contaminated: Please Repeat























imp/reccd


chest pain +troponins-management per PMD and cardiology 


pseudomona/enterococcal bacteremia secondary to UTI- continue 

cefepime/ampicillin


BPH with chronic swenson 


AS with bioprosthetic aortic valve


DNR/DNI


fecal impaction-laxatives


left hydronephrosis





clinically improved











continue amp/cefepime day #13 antibiotics


repeat labs in am

## 2020-09-20 NOTE — PN
Progress Note (short form)





- Note


Progress Note: 








1. JO ANN


2. sepsis


3. hyperkalemia


4. hypotension


5. chf


6. bph


7. cad


8. tia


9. gerd


10. hypernatremia





Active Medications





Acetaminophen (Tylenol -)  1,000 mg PO Q6H PRN


   PRN Reason: PAIN LEVEL 1-5


Albuterol Sulfate (Ventolin Hfa Inhaler -)  2 puff IH TID PRN


   PRN Reason: ASTHMA


Ascorbic Acid (Vitamin C -)  250 mg PO DAILY [facility]


   Last Admin: 09/20/20 09:01 Dose:  250 mg


   Documented by: 


Aspirin (Asa -)  81 mg PO DAILY [facility]


   Last Admin: 09/20/20 09:01 Dose:  81 mg


   Documented by: 


Atorvastatin Calcium (Lipitor -)  40 mg PO [facility]


   Last Admin: 09/19/20 21:51 Dose:  40 mg


   Documented by: 


Famotidine (Pepcid -)  20 mg PO DAILY [facility]


   Last Admin: 09/20/20 09:01 Dose:  20 mg


   Documented by: 


Ferrous Sulfate (Feosol -)  325 mg PO DAILY [facility]


   Last Admin: 09/20/20 09:01 Dose:  325 mg


   Documented by: 


Ampicillin Sodium 2 gm/ Sodium (Chloride)  100 mls @ 200 mls/hr IVPB Q4H-IV [facility];

Protocol


   Last Admin: 09/20/20 09:01 Dose:  200 mls/hr


   Documented by: 


Cefepime HCl 2 gm/ Dextrose  100 mls @ 200 mls/hr IVPB Q8H-IV [facility]; Protocol


   Last Admin: 09/20/20 09:02 Dose:  200 mls/hr


   Documented by: 


Sodium Chloride (Normal Saline -)  1,000 mls @ 42 mls/hr IV ASDIR [facility]


   Last Admin: 09/20/20 06:17 Dose:  42 mls/hr


   Documented by: 


Levothyroxine Sodium (Synthroid -)  100 mcg PO ACBK [facility]


   Last Admin: 09/20/20 06:40 Dose:  Not Given


   Documented by: 


Magnesium Hydroxide (Milk Of Magnesia -)  30 ml PO DAILY PRN


   PRN Reason: CONSTIPATION


Mirtazapine (Remeron -)  15 mg PO HS [facility]


   Last Admin: 09/19/20 21:51 Dose:  15 mg


   Documented by: 


Morphine Sulfate (Morphine Sulfate)  1 mg IVPUSH Q4H PRN


   PRN Reason: PAIN LEVEL 7 - 10


Multivitamins/Minerals/Vitamin C (Tab-A-Vit -)  1 tab PO DAILY [facility]


   Last Admin: 09/20/20 09:03 Dose:  1 tab


   Documented by: 


Nitroglycerin (Nitro-Bid 2% Paste -)  0.5 inch TD Q6H [facility]


   Last Admin: 09/20/20 11:57 Dose:  Not Given


   Documented by: 


Senna (Senna -)  2 tab PO HS [facility]


   Last Admin: 09/19/20 21:51 Dose:  2 tab


   Documented by: 


Tamsulosin HCl (Flomax -)  0.4 mg PO DAILY@0830 [facility]


   Last Admin: 09/20/20 09:00 Dose:  0.4 mg


   Documented by: 


Tramadol HCl (Ultram -)  50 mg PO Q8H PRN


   PRN Reason: PAIN LEVEL 6-10











                                Last Vital Signs











Temp Pulse Resp BP Pulse Ox


 


 98.4 F   88   16   143/96   98 


 


 09/20/20 06:00  09/20/20 06:00  09/20/20 10:00  09/20/20 06:00  09/20/20 10:00





























                                    CBC, BMP





                                 09/20/20 06:00 





                                 09/20/20 06:00

## 2020-09-20 NOTE — CON.CARD
Cardiology Consult (text)





- Consultation


Consultation Note: 








cc: chest pain, elevated troponin





hpi:  79 m hx htn, syst chf, as s/p remote bio avr, cad s/p remote pci, tia, 

here with hypotension, JO ANN. Admitted 9/7 for low BP from [facility]. Recently had 

admission for CHF 8/2020 was diuresed with IV lasix.  on labs here noted to have

leukocytosis, JO ANN with Cr 7.8, hyperkalemia.  Treated with IVF, renal function 

improved, also treated for pseudomonas/enterococcal bacteremia, ID following.  

Reportedly complained of chest pain 9/19.  patient does not remember event, 

however he is a poor historian and is unable to answer questions, tangential in 

conversation. history obtained from chart











pmh: per hpi


psh: avr


social: no tob


fam: no premature cad


ros: per hpi; all others nl





                                Home Medications











 Medication  Instructions  Recorded


 


Atorvastatin Ca [Lipitor] 20 mg PO HS 08/02/20


 


Ergocalciferol (Vitamin D2) 50,000 unit PO WEEKLY 08/02/20





[Vitamin D2]  


 


Ferrous Sulfate 325 mg PO DAILY 08/02/20


 


Levothyroxine [Synthroid -] 100 mcg PO DAILY 08/02/20


 


Tamsulosin HCl 0.4 mg PO DAILY 08/02/20


 


Ascorbic Acid [Vitamin C -] 250 mg PO DAILY  tablet 08/06/20


 


Aspirin [ASA -] 81 mg PO DAILY  tab.chew 08/06/20


 


Carvedilol [Coreg -] 3.125 mg PO BID  tablet 08/06/20


 


Docusate Sodium [Colace -] 100 mg PO BID  capsule 08/06/20


 


Furosemide [Lasix -] 40 mg PO DAILY  tablet 08/06/20


 


Pantoprazole Sodium [Protonix -] 20 mg PO DAILY  tablet.ec 08/06/20


 


Acetaminophen 650 mg PO QID PRN 09/07/20


 


Albuterol Sulfate [Proventil Hfa] 6.7 gm IH TID PRN 09/07/20


 


Mag Hydrox/Aluminum Hyd/Simeth 30 ml PO BID PRN 09/07/20





[Maalox Advanced Suspension]  


 


Melatonin 3 mg PO HS 09/07/20








                               Current Medications











Generic Name Dose Route Start Last Admin





  Trade Name Freq  PRN Reason Stop Dose Admin


 


Acetaminophen  1,000 mg  09/19/20 05:52 





  Tylenol -  PO  





  Q6H PRN  





  PAIN LEVEL 1-5  


 


Albuterol Sulfate  2 puff  09/19/20 05:52 





  Ventolin Hfa Inhaler -  IH  





  TID PRN  





  ASTHMA  


 


Ascorbic Acid  250 mg  09/19/20 10:00  09/20/20 09:01





  Vitamin C -  PO   250 mg





  DAILY ELLEN   Administration


 


Aspirin  81 mg  09/19/20 10:00  09/20/20 09:01





  Asa -  PO   81 mg





  DAILY ELLEN   Administration


 


Atorvastatin Calcium  40 mg  09/19/20 22:00  09/19/20 21:51





  Lipitor -  PO   40 mg





  HS ELLEN   Administration


 


Famotidine  20 mg  09/19/20 10:00  09/20/20 09:01





  Pepcid -  PO   20 mg





  DAILY ELLEN   Administration


 


Ferrous Sulfate  325 mg  09/19/20 10:00  09/20/20 09:01





  Feosol -  PO   325 mg





  DAILY ELLEN   Administration


 


Ampicillin Sodium 2 gm/ Sodium  100 mls @ 200 mls/hr  09/19/20 06:00  09/20/20 

09:01





  Chloride  IVPB   200 mls/hr





  Q4H-IV ELLEN   Administration





  Protocol  


 


Cefepime HCl 2 gm/ Dextrose  100 mls @ 200 mls/hr  09/19/20 10:00  09/20/20 

09:02





  IVPB   200 mls/hr





  Q8H-IV ELLEN   Administration





  Protocol  


 


Sodium Chloride  1,000 mls @ 42 mls/hr  09/19/20 05:52  09/20/20 06:17





  Normal Saline -  IV   42 mls/hr





  ASDIR ELLEN   Administration


 


Levothyroxine Sodium  100 mcg  09/19/20 07:00  09/20/20 06:40





  Synthroid -  PO   Not Given





  ACBK ELLEN  


 


Magnesium Hydroxide  30 ml  09/19/20 05:52 





  Milk Of Magnesia -  PO  





  DAILY PRN  





  CONSTIPATION  


 


Mirtazapine  15 mg  09/19/20 22:00  09/19/20 21:51





  Remeron -  PO   15 mg





  HS ELLNE   Administration


 


Morphine Sulfate  1 mg  09/19/20 05:53 





  Morphine Sulfate  IVPUSH  





  Q4H PRN  





  PAIN LEVEL 7 - 10  


 


Multivitamins/Minerals/Vitamin C  1 tab  09/19/20 10:00  09/20/20 09:03





  Tab-A-Vit -  PO   1 tab





  DAILY ELLEN   Administration


 


Nitroglycerin  0.5 inch  09/19/20 18:00  09/20/20 11:57





  Nitro-Bid 2% Paste -  TD   Not Given





  Q6H ELLEN  


 


Senna  2 tab  09/19/20 22:00  09/19/20 21:51





  Senna -  PO   2 tab





  HS ELLEN   Administration


 


Tamsulosin HCl  0.4 mg  09/19/20 08:30  09/20/20 09:00





  Flomax -  PO   0.4 mg





  DAILY@0830 ELLEN   Administration


 


Tramadol HCl  50 mg  09/19/20 05:52 





  Ultram -  PO  





  Q8H PRN  





  PAIN LEVEL 6-10  








                                   Vital Signs











 Period  Temp  Pulse  Resp  BP Sys/Villa  Pulse Ox


 


 Last 24 Hr  98.0 F-98.6 F  74-92  14-21  /70-96  











nad no jvd


rrr s1s2 no mrg


cta bl nl eff


aao3


1-2+ le edema bl, no c/c


abd nt nd pos bs


no jaundice diaphoresis


+dp pt no carotid bruits





                                        


echo 8/2020:  lvef 35%, global hk, nl rv, lae, nl bio avr, mild mr, mild tr, 

rvsp 38





echo 9/2020 EF 35-40%, mod to severe global hypokinesis of LV, severe 

hypokinesis of apical anterior wall and apex, LA mod dilated, mild to mod MAC, 

mild to mod MR, mild TR, bio AVR, well seated prosthetic valve, mild AR





ecg: sr, rbbb, PVCs





cxr: clear





tele: sr, PVCs








a/p:  79 m hx htn, syst chf, as s/p remote bio avr, cad s/p remote pci, tia, 

presented with JO ANN, sepsis with chest pain, elevated trops





chest pain, elevated troponin, CAD, remote PCI


- patient unable to give further history regarding chest pain, denies pain now


- EKG unchanged from prior


- may be 2/2 demand in setting of underlying anemia, JO ANN vs NSTEMI from plaque 

rupture


- also with wall motion abnormalities on echo here from 9/11/2020, known 

underlying CAD


- given poor prognosis and desire to minimize invasive procedures, would defer 

ischemic workup at this point and continue medical management for underlying CAD


- cont aspirin, statin





chronic syst chf:


- held coreg for low BPs, not on ACE/ARB due to JO ANN


- appears euvolemic, hold diuretics





hypotension, septic shock, UTI


- bacteremia 2/2 UTI, bcx no growth from 9/10


- improved with IVF, abx


- manage per pmd, ID





JO ANN


- improving with IVF





htn:


- bp stable off meds





avr:


-stable, nl fcn on echo

## 2020-09-21 LAB
ALBUMIN SERPL-MCNC: 1.3 G/DL (ref 3.4–5)
ALP SERPL-CCNC: 142 U/L (ref 45–117)
ALT SERPL-CCNC: 11 U/L (ref 13–61)
ANION GAP SERPL CALC-SCNC: 9 MMOL/L (ref 8–16)
AST SERPL-CCNC: 45 U/L (ref 15–37)
BASOPHILS # BLD: 0.9 % (ref 0–2)
BILIRUB SERPL-MCNC: 0.4 MG/DL (ref 0.2–1)
BUN SERPL-MCNC: 19.4 MG/DL (ref 7–18)
CALCIUM SERPL-MCNC: 7.4 MG/DL (ref 8.5–10.1)
CHLORIDE SERPL-SCNC: 109 MMOL/L (ref 98–107)
CO2 SERPL-SCNC: 19 MMOL/L (ref 21–32)
CREAT SERPL-MCNC: 1.3 MG/DL (ref 0.55–1.3)
DEPRECATED RDW RBC AUTO: 18.3 % (ref 11.9–15.9)
EOSINOPHIL # BLD: 1.3 % (ref 0–4.5)
GLUCOSE SERPL-MCNC: 75 MG/DL (ref 74–106)
HCT VFR BLD CALC: 30.9 % (ref 35.4–49)
HGB BLD-MCNC: 10 GM/DL (ref 11.7–16.9)
LYMPHOCYTES # BLD: 6.4 % (ref 8–40)
MCH RBC QN AUTO: 26.8 PG (ref 25.7–33.7)
MCHC RBC AUTO-ENTMCNC: 32.5 G/DL (ref 32–35.9)
MCV RBC: 82.7 FL (ref 80–96)
MONOCYTES # BLD AUTO: 6.2 % (ref 3.8–10.2)
NEUTROPHILS # BLD: 85.2 % (ref 42.8–82.8)
PLATELET # BLD AUTO: 258 K/MM3 (ref 134–434)
PMV BLD: 8.6 FL (ref 7.5–11.1)
POTASSIUM SERPLBLD-SCNC: 5.3 MMOL/L (ref 3.5–5.1)
PROT SERPL-MCNC: 5.7 G/DL (ref 6.4–8.2)
RBC # BLD AUTO: 3.74 M/MM3 (ref 4–5.6)
SODIUM SERPL-SCNC: 136 MMOL/L (ref 136–145)
WBC # BLD AUTO: 9.3 K/MM3 (ref 4–10)

## 2020-09-21 RX ADMIN — AMPICILLIN SODIUM SCH MLS/HR: 2 INJECTION, POWDER, FOR SOLUTION INTRAMUSCULAR; INTRAVENOUS at 01:08

## 2020-09-21 RX ADMIN — NITROGLYCERIN SCH INCH: 20 OINTMENT TOPICAL at 17:57

## 2020-09-21 RX ADMIN — ATORVASTATIN CALCIUM SCH: 40 TABLET, FILM COATED ORAL at 21:25

## 2020-09-21 RX ADMIN — NITROGLYCERIN SCH: 20 OINTMENT TOPICAL at 11:08

## 2020-09-21 RX ADMIN — MULTIVITAMIN TABLET SCH: TABLET at 10:18

## 2020-09-21 RX ADMIN — AMPICILLIN SODIUM SCH MLS/HR: 2 INJECTION, POWDER, FOR SOLUTION INTRAMUSCULAR; INTRAVENOUS at 05:08

## 2020-09-21 RX ADMIN — CEFEPIME HYDROCHLORIDE SCH MLS/HR: 2 INJECTION, POWDER, FOR SOLUTION INTRAVENOUS at 02:09

## 2020-09-21 RX ADMIN — LEVOTHYROXINE SODIUM SCH: 100 TABLET ORAL at 06:13

## 2020-09-21 RX ADMIN — AMPICILLIN SODIUM SCH MLS/HR: 2 INJECTION, POWDER, FOR SOLUTION INTRAMUSCULAR; INTRAVENOUS at 13:46

## 2020-09-21 RX ADMIN — AMPICILLIN SODIUM SCH MLS/HR: 2 INJECTION, POWDER, FOR SOLUTION INTRAMUSCULAR; INTRAVENOUS at 17:47

## 2020-09-21 RX ADMIN — CEFEPIME HYDROCHLORIDE SCH MLS/HR: 2 INJECTION, POWDER, FOR SOLUTION INTRAVENOUS at 17:48

## 2020-09-21 RX ADMIN — FERROUS SULFATE TAB EC 324 MG (65 MG FE EQUIVALENT) SCH: 324 (65 FE) TABLET DELAYED RESPONSE at 10:18

## 2020-09-21 RX ADMIN — SODIUM CHLORIDE SCH MLS/HR: 9 INJECTION, SOLUTION INTRAVENOUS at 06:12

## 2020-09-21 RX ADMIN — FAMOTIDINE SCH: 20 TABLET ORAL at 10:18

## 2020-09-21 RX ADMIN — AMPICILLIN SODIUM SCH MLS/HR: 2 INJECTION, POWDER, FOR SOLUTION INTRAMUSCULAR; INTRAVENOUS at 21:25

## 2020-09-21 RX ADMIN — NITROGLYCERIN SCH: 20 OINTMENT TOPICAL at 23:20

## 2020-09-21 RX ADMIN — NITROGLYCERIN SCH INCH: 20 OINTMENT TOPICAL at 12:45

## 2020-09-21 RX ADMIN — CEFEPIME HYDROCHLORIDE SCH MLS/HR: 2 INJECTION, POWDER, FOR SOLUTION INTRAVENOUS at 09:30

## 2020-09-21 RX ADMIN — MIRTAZAPINE SCH: 15 TABLET, FILM COATED ORAL at 21:25

## 2020-09-21 RX ADMIN — SENNOSIDES SCH: 8.6 TABLET, FILM COATED ORAL at 21:25

## 2020-09-21 RX ADMIN — NITROGLYCERIN SCH: 20 OINTMENT TOPICAL at 06:13

## 2020-09-21 RX ADMIN — AMPICILLIN SODIUM SCH MLS/HR: 2 INJECTION, POWDER, FOR SOLUTION INTRAMUSCULAR; INTRAVENOUS at 09:33

## 2020-09-21 RX ADMIN — Medication SCH: at 10:18

## 2020-09-21 RX ADMIN — NITROGLYCERIN SCH: 20 OINTMENT TOPICAL at 01:08

## 2020-09-21 RX ADMIN — ASPIRIN 81 MG SCH: 81 TABLET ORAL at 10:18

## 2020-09-21 RX ADMIN — TAMSULOSIN HYDROCHLORIDE SCH: 0.4 CAPSULE ORAL at 10:17

## 2020-09-21 RX ADMIN — DEXTROSE AND SODIUM CHLORIDE SCH MLS/HR: 5; 450 INJECTION, SOLUTION INTRAVENOUS at 22:00

## 2020-09-21 NOTE — PN
Progress Note (short form)





- Note


Progress Note: 


s: no cp sob palps dizzy; +abd pain


no cigs


                               Current Medications











Generic Name Dose Route Start Last Admin





  Trade Name Freq  PRN Reason Stop Dose Admin


 


Acetaminophen  1,000 mg  09/19/20 05:52 





  Tylenol -  PO  





  Q6H PRN  





  PAIN LEVEL 1-5  


 


Albuterol Sulfate  2 puff  09/19/20 05:52 





  Ventolin Hfa Inhaler -  IH  





  TID PRN  





  ASTHMA  


 


Ascorbic Acid  250 mg  09/19/20 10:00  09/21/20 10:18





  Vitamin C -  PO   Not Given





  DAILY [facility]  


 


Aspirin  81 mg  09/19/20 10:00  09/21/20 10:18





  Asa -  PO   Not Given





  DAILY [facility]  


 


Atorvastatin Calcium  40 mg  09/19/20 22:00  09/20/20 21:41





  Lipitor -  PO   Not Given





  HS [facility]  


 


Famotidine  20 mg  09/19/20 10:00  09/21/20 10:18





  Pepcid -  PO   Not Given





  DAILY [facility]  


 


Ferrous Sulfate  325 mg  09/19/20 10:00  09/21/20 10:18





  Feosol -  PO   Not Given





  DAILY ELLEN  


 


Ampicillin Sodium 2 gm/ Sodium  100 mls @ 200 mls/hr  09/19/20 06:00  09/21/20 

09:33





  Chloride  IVPB   200 mls/hr





  Q4H-IV ELLEN   Administration





  Protocol  


 


Cefepime HCl 2 gm/ Dextrose  100 mls @ 200 mls/hr  09/19/20 10:00  09/21/20 

09:30





  IVPB   200 mls/hr





  Q8H-IV ELLEN   Administration





  Protocol  


 


Sodium Chloride  1,000 mls @ 42 mls/hr  09/19/20 05:52  09/21/20 06:12





  Normal Saline -  IV   42 mls/hr





  ASDIR ELLEN   Administration


 


Levothyroxine Sodium  100 mcg  09/19/20 07:00  09/21/20 06:13





  Synthroid -  PO   Not Given





  ACBK ELLEN  


 


Magnesium Hydroxide  30 ml  09/19/20 05:52 





  Milk Of Magnesia -  PO  





  DAILY PRN  





  CONSTIPATION  


 


Mirtazapine  15 mg  09/19/20 22:00  09/20/20 21:41





  Remeron -  PO   Not Given





  HS [facility]  


 


Morphine Sulfate  1 mg  09/19/20 05:53 





  Morphine Sulfate  IVPUSH  





  Q4H PRN  





  PAIN LEVEL 7 - 10  


 


Multivitamins/Minerals/Vitamin C  1 tab  09/19/20 10:00  09/21/20 10:18





  Tab-A-Vit -  PO   Not Given





  DAILY ELLEN  


 


Nitroglycerin  0.5 inch  09/19/20 18:00  09/21/20 11:08





  Nitro-Bid 2% Paste -  TD   Not Given





  Q6H ELLEN  


 


Senna  2 tab  09/19/20 22:00  09/20/20 21:41





  Senna -  PO   Not Given





  HS ELLEN  


 


Tamsulosin HCl  0.4 mg  09/19/20 08:30  09/21/20 10:17





  Flomax -  PO   Not Given





  DAILY@0830 ELLEN  


 


Tramadol HCl  50 mg  09/19/20 05:52 





  Ultram -  PO  





  Q8H PRN  





  PAIN LEVEL 6-10  








                                   Vital Signs











 Period  Temp  Pulse  Resp  BP Sys/Villa  Pulse Ox


 


 Last 24 Hr  98.5 F  82-88  15-20  114-140/74-83  97-98











nad no jvd


rrr s1s2 no mrg


cta bl nl eff


1+ le edema bl, no c/c


abd nt nd pos bs


no jaundice diaphoresis


+dp pt no carotid bruits





                                    CBC, BMP





                                 09/21/20 05:15 





                                 09/21/20 05:15 








                                        


echo 8/2020:  lvef 35%, global hk, nl rv, lae, nl bio avr, mild mr, mild tr, 

rvsp 38





echo 9/2020 EF 35-40%, mod to severe global hypokinesis of LV, severe 

hypokinesis of apical anterior wall and apex, LA mod dilated, mild to mod MAC, 

mild to mod MR, mild TR, bio AVR, well seated prosthetic valve, mild AR





ecg: sr, rbbb, PVCs





cxr: clear





tele: sr, PVCs








a/p:  79 m hx htn, syst chf, as s/p remote bio avr, cad s/p remote pci, tia, 

presented with JO ANN, sepsis with chest pain, elevated trops





chest pain, elevated troponin, CAD, remote PCI


- patient unable to give further history regarding chest pain, denies pain now


- EKG unchanged from prior


- may be 2/2 demand in setting of underlying anemia, JO ANN, vs NSTEMI from plaque 

rupture


- also with wall motion abnormalities on echo here from 9/11/2020, known 

underlying CAD


- given poor prognosis and desire to minimize invasive procedures, would defer 

ischemic workup at this point and continue medical management for underlying CAD


- cont aspirin, statin





chronic syst chf:


- held coreg for low BPs, not on ACE/ARB due to JO ANN


- appears euvolemic, hold diuretics





hypotension, septic shock, UTI


- bacteremia 2/2 UTI, bcx no growth from 9/10


- improved with IVF, abx


- manage per pmd, ID





JO ANN


- improving with IVF





htn:


- bp stable off meds





avr:


-stable, nl fcn on echo

## 2020-09-21 NOTE — PN
Progress Note, Physician


History of Present Illness: 





Pt seen and examined at bedside. He is awake but agitated today. 





- Current Medication List


Current Medications: 


Active Medications





Acetaminophen (Tylenol -)  1,000 mg PO Q6H PRN


   PRN Reason: PAIN LEVEL 1-5


Albuterol Sulfate (Ventolin Hfa Inhaler -)  2 puff IH TID PRN


   PRN Reason: ASTHMA


Ascorbic Acid (Vitamin C -)  250 mg PO DAILY [facility]


   Last Admin: 09/21/20 10:18 Dose:  Not Given


   Documented by: 


Aspirin (Asa -)  81 mg PO DAILY [facility]


   Last Admin: 09/21/20 10:18 Dose:  Not Given


   Documented by: 


Atorvastatin Calcium (Lipitor -)  40 mg PO [facility]


   Last Admin: 09/20/20 21:41 Dose:  Not Given


   Documented by: 


Famotidine (Pepcid -)  20 mg PO DAILY [facility]


   Last Admin: 09/21/20 10:18 Dose:  Not Given


   Documented by: 


Ferrous Sulfate (Feosol -)  325 mg PO DAILY [facility]


   Last Admin: 09/21/20 10:18 Dose:  Not Given


   Documented by: 


Ampicillin Sodium 2 gm/ Sodium (Chloride)  100 mls @ 200 mls/hr IVPB Q4H-IV [facility];

Protocol


   Last Admin: 09/21/20 13:46 Dose:  200 mls/hr


   Documented by: 


Cefepime HCl 2 gm/ Dextrose  100 mls @ 200 mls/hr IVPB Q8H-IV [facility]; Protocol


   Last Admin: 09/21/20 09:30 Dose:  200 mls/hr


   Documented by: 


Sodium Chloride (Normal Saline -)  1,000 mls @ 42 mls/hr IV ASDIR [facility]


   Last Admin: 09/21/20 06:12 Dose:  42 mls/hr


   Documented by: 


Levothyroxine Sodium (Synthroid -)  100 mcg PO ACBK [facility]


   Last Admin: 09/21/20 06:13 Dose:  Not Given


   Documented by: 


Magnesium Hydroxide (Milk Of Magnesia -)  30 ml PO DAILY PRN


   PRN Reason: CONSTIPATION


Mirtazapine (Remeron -)  15 mg PO HS [facility]


   Last Admin: 09/20/20 21:41 Dose:  Not Given


   Documented by: 


Morphine Sulfate (Morphine Sulfate)  1 mg IVPUSH Q4H PRN


   PRN Reason: PAIN LEVEL 7 - 10


Multivitamins/Minerals/Vitamin C (Tab-A-Vit -)  1 tab PO DAILY [facility]


   Last Admin: 09/21/20 10:18 Dose:  Not Given


   Documented by: 


Nitroglycerin (Nitro-Bid 2% Paste -)  0.5 inch TD Q6H [facility]


   Last Admin: 09/21/20 12:45 Dose:  0.5 inch


   Documented by: 


Senna (Senna -)  2 tab PO HS [facility]


   Last Admin: 09/20/20 21:41 Dose:  Not Given


   Documented by: 


Tamsulosin HCl (Flomax -)  0.4 mg PO DAILY@0830 [facility]


   Last Admin: 09/21/20 10:17 Dose:  Not Given


   Documented by: 


Tramadol HCl (Ultram -)  50 mg PO Q8H PRN


   PRN Reason: PAIN LEVEL 6-10











- Objective


Vital Signs: 


                                   Vital Signs











Temperature  98.5 F   09/21/20 06:00


 


Pulse Rate  87   09/21/20 06:00


 


Respiratory Rate  15   09/21/20 06:00


 


Blood Pressure  114/74   09/21/20 06:00


 


O2 Sat by Pulse Oximetry (%)  97   09/21/20 06:00











Constitutional: Yes: Calm


Eyes: Yes: Conjunctiva Clear


HENT: Yes: Atraumatic


Neck: Yes: Supple


Cardiovascular: Yes: S1, S2


Respiratory: Yes: CTA Bilaterally


Gastrointestinal: Yes: Soft


Genitourinary: Yes: Rios Present


Musculoskeletal: Yes: Muscle Weakness


Edema: No


Neurological: Yes: Oriented


Psychiatric: Yes: Agitated


Labs: 


                                    CBC, BMP





                                 09/21/20 05:15 





                                 09/21/20 05:15 





                                    INR, PTT











INR  0.95  (0.83-1.09)   09/07/20  22:50    














Problem List





- Problems


(1) JO ANN (acute kidney injury)


Code(s): N17.9 - ACUTE KIDNEY FAILURE, UNSPECIFIED   





(2) Hyperkalemia


Code(s): E87.5 - HYPERKALEMIA   





(3) Sepsis


Code(s): A41.9 - SEPSIS, UNSPECIFIED ORGANISM   





Assessment/Plan


                               Current Medications











Generic Name Dose Route Start Last Admin





  Trade Name Freq  PRN Reason Stop Dose Admin


 


Acetaminophen  1,000 mg  09/19/20 05:52 





  Tylenol -  PO  





  Q6H PRN  





  PAIN LEVEL 1-5  


 


Albuterol Sulfate  2 puff  09/19/20 05:52 





  Ventolin Hfa Inhaler -  IH  





  TID PRN  





  ASTHMA  


 


Ascorbic Acid  250 mg  09/19/20 10:00  09/21/20 10:18





  Vitamin C -  PO   Not Given





  DAILY [facility]  


 


Aspirin  81 mg  09/19/20 10:00  09/21/20 10:18





  Asa -  PO   Not Given





  DAILY [facility]  


 


Atorvastatin Calcium  40 mg  09/19/20 22:00  09/20/20 21:41





  Lipitor -  PO   Not Given





  HS [facility]  


 


Famotidine  20 mg  09/19/20 10:00  09/21/20 10:18





  Pepcid -  PO   Not Given





  DAILY [facility]  


 


Ferrous Sulfate  325 mg  09/19/20 10:00  09/21/20 10:18





  Feosol -  PO   Not Given





  DAILY [facility]  


 


Ampicillin Sodium 2 gm/ Sodium  100 mls @ 200 mls/hr  09/19/20 06:00  09/21/20 

13:46





  Chloride  IVPB   200 mls/hr





  Q4H-IV ELLEN   Administration





  Protocol  


 


Cefepime HCl 2 gm/ Dextrose  100 mls @ 200 mls/hr  09/19/20 10:00  09/21/20 

09:30





  IVPB   200 mls/hr





  Q8H-IV ELLEN   Administration





  Protocol  


 


Sodium Chloride  1,000 mls @ 42 mls/hr  09/19/20 05:52  09/21/20 06:12





  Normal Saline -  IV   42 mls/hr





  ASDIR [facility]   Administration


 


Levothyroxine Sodium  100 mcg  09/19/20 07:00  09/21/20 06:13





  Synthroid -  PO   Not Given





  ACBK [facility]  


 


Magnesium Hydroxide  30 ml  09/19/20 05:52 





  Milk Of Magnesia -  PO  





  DAILY PRN  





  [facility]IP[facility]  


 


Mirtazapine  15 mg  09/19/20 22:00  09/20/20 21:41





  Remeron -  PO   Not Given





  HS [facility]  


 


Morphine Sulfate  1 mg  09/19/20 05:53 





  Morphine Sulfate  IVPUSH  





  Q4H PRN  





  PAIN LEVEL 7 - 10  


 


Multivitamins/Minerals/Vitamin C  1 tab  09/19/20 10:00  09/21/20 10:18





  Tab-A-Vit -  PO   Not Given





  DAILY [facility]  


 


Nitroglycerin  0.5 inch  09/19/20 18:00  09/21/20 12:45





  Nitro-Bid 2% Paste -  TD   0.5 inch





  Q6H ELLEN   Administration


 


Senna  2 tab  09/19/20 22:00  09/20/20 21:41





  Senna -  PO   Not Given





  [facility]  


 


Tamsulosin HCl  0.4 mg  09/19/20 08:30  09/21/20 10:17





  Flomax -  PO   Not Given





  DAILY@0830 [facility]  


 


Tramadol HCl  50 mg  09/19/20 05:52 





  Ultram -  PO  





  Q8H PRN  





  PAIN LEVEL 6-10  














Impression


1. JO ANN


2. sepsis


3. hyperkalemia


4. hypotension


5. chf


6. bph


7. cad


8. tia


9. gerd


10. hypernatremia





Plan


- potassium hemolyzed


- repeat labs in am


- change fluids to 1/2 ns


- cardio input appreciated


- encourage po intake

## 2020-09-21 NOTE — PN
Progress Note (short form)





- Note


Progress Note: 





events noted


on telemetry for elevated troponins





no abdominal pain





                                   Vital Signs











 Period  Temp  Pulse  Resp  BP Sys/Villa  Pulse Ox


 


 Last 24 Hr  98.5 F  82-88  15-20  114-140/74-83  97-98








cor-rrr


lungs clear


abd soft,nt


ext no edema





                                    CBC, BMP





                                 09/21/20 05:15 





                                 09/21/20 05:15 





                                  Microbiology





09/10/20 09:37   Blood - Peripheral Venous   Blood Culture - Final


                            NO GROWTH AFTER 5 DAYS INCUBATION


09/09/20 17:15   Blood - Peripheral Venous   Blood Culture - Final


                            NO GROWTH AFTER 5 DAYS INCUBATION


09/09/20 17:30   Blood - Peripheral Venous   Blood Culture - Final


                            NO GROWTH AFTER 5 DAYS INCUBATION


09/09/20 22:25   Urine - Urine Swenson   Urine Culture - Final


                            Pseudomonas Aeruginosa


09/07/20 22:50   Blood - Peripheral Venous   Blood Culture - Final


                            Group D Strep Or Entero Coccus


                            Pseudomonas Species


09/07/20 22:50   Blood - Peripheral Venous   Blood Culture - Final


                            Enterococcus Faecalis


                            Pseudomonas Aeruginosa


09/08/20 00:27   Urine - Urine - Catheterized   Urine Culture - Final


                            Contaminated: Please Repeat
































imp/reccd


chest pain +troponins-management per PMD and cardiology 


severe sepsis/pseudomona/enterococcal bacteremia secondary to UTI- continue 

cefepime/ampicillin


BPH with chronic swenson 


AS with bioprosthetic aortic valve


DNR/DNI


fecal impaction-laxatives


left hydronephrosis





clinically improved











continue amp/cefepime day #14 antibiotics-would place picc line and treat for 

anothwe two weeks iv with ampicillin and cefepime


wbc has finally normalized today

## 2020-09-21 NOTE — PN
Progress Note, Physician


Chief Complaint: 





Sepsis


JO ANN


UTI


Bacteremia


Dysphagia


History of Present Illness: 





Mr. Lovelace is an 79 year old male BIBA from [facility] for 

hypotension (SBP in the 70's). He has a past medical history of hypertension, 

CHF, aortic stenosis s/p bioprosthetic valve replacement, CAD, prior stent, 

GERD,TIA,BPH and indwelling swenson. 





NAD


eventful weekend with chest pain 


transfered to ICU onheparin drip


Elevated trops-peaked already, now trending down


Seen by Cardiology


Conservative care


Refuses most of his medications


c/o abd pain- recent CTAP showed constipation, encouraged to take MOM





- Current Medication List


Current Medications: 


Active Medications





Acetaminophen (Tylenol -)  1,000 mg PO Q6H PRN


   PRN Reason: PAIN LEVEL 1-5


Albuterol Sulfate (Ventolin Hfa Inhaler -)  2 puff IH TID PRN


   PRN Reason: ASTHMA


Ascorbic Acid (Vitamin C -)  250 mg PO DAILY [facility]


   Last Admin: 09/21/20 10:18 Dose:  Not Given


   Documented by: 


Aspirin (Asa -)  81 mg PO DAILY [facility]


   Last Admin: 09/21/20 10:18 Dose:  Not Given


   Documented by: 


Atorvastatin Calcium (Lipitor -)  40 mg PO HS [facility]


   Last Admin: 09/20/20 21:41 Dose:  Not Given


   Documented by: 


Famotidine (Pepcid -)  20 mg PO DAILY [facility]


   Last Admin: 09/21/20 10:18 Dose:  Not Given


   Documented by: 


Ferrous Sulfate (Feosol -)  325 mg PO DAILY [facility]


   Last Admin: 09/21/20 10:18 Dose:  Not Given


   Documented by: 


Ampicillin Sodium 2 gm/ Sodium (Chloride)  100 mls @ 200 mls/hr IVPB Q4H-IV ELLEN;

Protocol


   Last Admin: 09/21/20 09:33 Dose:  200 mls/hr


   Documented by: 


Cefepime HCl 2 gm/ Dextrose  100 mls @ 200 mls/hr IVPB Q8H-IV ELLEN; Protocol


   Last Admin: 09/21/20 09:30 Dose:  200 mls/hr


   Documented by: 


Sodium Chloride (Normal Saline -)  1,000 mls @ 42 mls/hr IV ASDIR [facility]


   Last Admin: 09/21/20 06:12 Dose:  42 mls/hr


   Documented by: 


Levothyroxine Sodium (Synthroid -)  100 mcg PO ACBK [facility]


   Last Admin: 09/21/20 06:13 Dose:  Not Given


   Documented by: 


Magnesium Hydroxide (Milk Of Magnesia -)  30 ml PO DAILY PRN


   PRN Reason: CONSTIPATION


Mirtazapine (Remeron -)  15 mg PO [facility]


   Last Admin: 09/20/20 21:41 Dose:  Not Given


   Documented by: 


Morphine Sulfate (Morphine Sulfate)  1 mg IVPUSH Q4H PRN


   PRN Reason: PAIN LEVEL 7 - 10


Multivitamins/Minerals/Vitamin C (Tab-A-Vit -)  1 tab PO DAILY [facility]


   Last Admin: 09/21/20 10:18 Dose:  Not Given


   Documented by: 


Nitroglycerin (Nitro-Bid 2% Paste -)  0.5 inch TD Q6H [facility]


   Last Admin: 09/21/20 11:08 Dose:  Not Given


   Documented by: 


Senna (Senna -)  2 tab PO [facility]


   Last Admin: 09/20/20 21:41 Dose:  Not Given


   Documented by: 


Tamsulosin HCl (Flomax -)  0.4 mg PO DAILY@0830 [facility]


   Last Admin: 09/21/20 10:17 Dose:  Not Given


   Documented by: 


Tramadol HCl (Ultram -)  50 mg PO Q8H PRN


   PRN Reason: PAIN LEVEL 6-10











- Objective


Vital Signs: 


                                   Vital Signs











Temperature  98.5 F   09/21/20 06:00


 


Pulse Rate  87   09/21/20 06:00


 


Respiratory Rate  15   09/21/20 06:00


 


Blood Pressure  114/74   09/21/20 06:00


 


O2 Sat by Pulse Oximetry (%)  97   09/21/20 06:00











Constitutional: Yes: No Distress, Calm, Cachectic


Cardiovascular: Yes: Regular Rate and Rhythm


Respiratory: Yes: Regular, CTA Bilaterally


Gastrointestinal: Yes: Soft, Hypoactive Bowel Sounds


Genitourinary: Yes: Swenson Present


Musculoskeletal: Yes: Muscle Weakness


Extremities: Yes: Other (generalized atrophy)


Edema: No


Peripheral Pulses WNL: Yes


Neurological: Yes: Alert, Oriented


Psychiatric: Yes: Alert, Oriented


Labs: 


                                    CBC, BMP





                                 09/21/20 05:15 





                                 09/21/20 05:15 





                                    INR, PTT











INR  0.95  (0.83-1.09)   09/07/20  22:50    














Problem List





- Problems


(1) Chronic retention of urine


Assessment/Plan: 


-Continue swenson cath


Problems reviewed: Yes   


Code(s): R33.9 - RETENTION OF URINE, UNSPECIFIED   





(2) JO ANN (acute kidney injury)


Assessment/Plan: 


-Nephrology consult


-Renal U/S


-UC pseudomonas


-Cr at baseline


-Continue IVF


Problems reviewed: Yes   


Code(s): N17.9 - ACUTE KIDNEY FAILURE, UNSPECIFIED   





(3) Hyperkalemia


Assessment/Plan: 


-K hemolyzed today


-repeat CMP in AM


-Nephrology on board


Problems reviewed: Yes   


Code(s): E87.5 - HYPERKALEMIA   





(4) Sepsis


Assessment/Plan: 


-ID consult


-IV Ampicillin + Cefepime


-No Lactic acidosis


-Afebrile


-Cultures:


                                  Microbiology





09/10/20 09:37   Blood - Peripheral Venous   Blood Culture - Final


                            NO GROWTH AFTER 5 DAYS INCUBATION


09/09/20 17:15   Blood - Peripheral Venous   Blood Culture - Final


                            NO GROWTH AFTER 5 DAYS INCUBATION


09/09/20 17:30   Blood - Peripheral Venous   Blood Culture - Final


                            NO GROWTH AFTER 5 DAYS INCUBATION


09/09/20 22:25   Urine - Urine Swenson   Urine Culture - Final


                            Pseudomonas Aeruginosa


09/07/20 22:50   Blood - Peripheral Venous   Blood Culture - Final


                            Group D Strep Or Entero Coccus


                            Pseudomonas Species


09/07/20 22:50   Blood - Peripheral Venous   Blood Culture - Final


                            Enterococcus Faecalis


                            Pseudomonas Aeruginosa


09/08/20 00:27   Urine - Urine - Catheterized   Urine Culture - Final


                            Contaminated: Please Repeat











Problems reviewed: Yes   


Code(s): A41.9 - SEPSIS, UNSPECIFIED ORGANISM   





(5) Failure to thrive


Assessment/Plan: 


-Add multivitamin


-Ensure pudding + Magic cup


-Seen by Speech pathology


-MBS- Mod to severe stasis with puree. Aspiration on thin liquid


-Dysphagia puree thinned out with nectar thick liquids





Problems reviewed: Yes   


Code(s): INH2099 -    





(6) Malnutrition


Assessment/Plan: 


-Multivitamin


-Mirtazapine


Problems reviewed: Yes   


Code(s): E46 - UNSPECIFIED PROTEIN-CALORIE MALNUTRITION   


Qualifiers: 


   Protein-calorie malnutrition severity: severe 





(7) Bacteremia


Assessment/Plan: 


-Cultures:


                                  Microbiology





09/10/20 09:37   Blood - Peripheral Venous   Blood Culture - Final


                            NO GROWTH AFTER 5 DAYS INCUBATION


09/09/20 17:15   Blood - Peripheral Venous   Blood Culture - Final


                            NO GROWTH AFTER 5 DAYS INCUBATION


09/09/20 17:30   Blood - Peripheral Venous   Blood Culture - Final


                            NO GROWTH AFTER 5 DAYS INCUBATION


09/09/20 22:25   Urine - Urine Swenson   Urine Culture - Final


                            Pseudomonas Aeruginosa


09/07/20 22:50   Blood - Peripheral Venous   Blood Culture - Final


                            Group D Strep Or Entero Coccus


                            Pseudomonas Species


09/07/20 22:50   Blood - Peripheral Venous   Blood Culture - Final


                            Enterococcus Faecalis


                            Pseudomonas Aeruginosa


09/08/20 00:27   Urine - Urine - Catheterized   Urine Culture - Final


                            Contaminated: Please Repeat





-rest as above


Code(s): R78.81 - BACTEREMIA   





(8) Chest pain


Assessment/Plan: 


-Cardiology consult appreciated


-Conservative measures


-Tele monitor


-Nitro patch


-Echo reviewed


Problems reviewed: Yes   


Code(s): R07.9 - CHEST PAIN, UNSPECIFIED   





(9) Elevated troponin


Assessment/Plan: 


as above


Problems reviewed: Yes   


Code(s): R79.89 - OTHER SPECIFIED ABNORMAL FINDINGS OF BLOOD CHEMISTRY   





(10) NSTEMI (non-ST elevated myocardial infarction)


Assessment/Plan: 


as above


Problems reviewed: Yes   


Code(s): I21.4 - NON-ST ELEVATION (NSTEMI) MYOCARDIAL INFARCTION   





(11) Abdominal pain


Assessment/Plan: 


-2/2 to constipation


Problems reviewed: Yes   


Code(s): R10.9 - UNSPECIFIED ABDOMINAL PAIN   





(12) Constipation


Assessment/Plan: 


-Continue Senna 2 tabs HS


-MOM PRN


Problems reviewed: Yes   


Code(s): K59.00 - CONSTIPATION, UNSPECIFIED   





Assessment/Plan





See problem list

## 2020-09-22 RX ADMIN — AMPICILLIN SODIUM SCH MLS/HR: 2 INJECTION, POWDER, FOR SOLUTION INTRAMUSCULAR; INTRAVENOUS at 14:15

## 2020-09-22 RX ADMIN — NITROGLYCERIN SCH INCH: 20 OINTMENT TOPICAL at 14:00

## 2020-09-22 RX ADMIN — ACETAMINOPHEN PRN MG: 500 TABLET, FILM COATED ORAL at 06:56

## 2020-09-22 RX ADMIN — MIRTAZAPINE SCH MG: 15 TABLET, FILM COATED ORAL at 00:13

## 2020-09-22 RX ADMIN — ATORVASTATIN CALCIUM SCH: 40 TABLET, FILM COATED ORAL at 21:50

## 2020-09-22 RX ADMIN — CEFEPIME HYDROCHLORIDE SCH MLS/HR: 2 INJECTION, POWDER, FOR SOLUTION INTRAVENOUS at 02:21

## 2020-09-22 RX ADMIN — CEFEPIME HYDROCHLORIDE SCH MLS/HR: 2 INJECTION, POWDER, FOR SOLUTION INTRAVENOUS at 10:34

## 2020-09-22 RX ADMIN — SENNOSIDES SCH: 8.6 TABLET, FILM COATED ORAL at 21:49

## 2020-09-22 RX ADMIN — CEFEPIME HYDROCHLORIDE SCH MLS/HR: 2 INJECTION, POWDER, FOR SOLUTION INTRAVENOUS at 17:10

## 2020-09-22 RX ADMIN — NITROGLYCERIN SCH INCH: 20 OINTMENT TOPICAL at 06:52

## 2020-09-22 RX ADMIN — FAMOTIDINE SCH MG: 20 TABLET ORAL at 10:36

## 2020-09-22 RX ADMIN — TAMSULOSIN HYDROCHLORIDE SCH: 0.4 CAPSULE ORAL at 15:13

## 2020-09-22 RX ADMIN — AMPICILLIN SODIUM SCH MLS/HR: 2 INJECTION, POWDER, FOR SOLUTION INTRAMUSCULAR; INTRAVENOUS at 18:51

## 2020-09-22 RX ADMIN — AMPICILLIN SODIUM SCH MLS/HR: 2 INJECTION, POWDER, FOR SOLUTION INTRAMUSCULAR; INTRAVENOUS at 01:02

## 2020-09-22 RX ADMIN — LEVOTHYROXINE SODIUM SCH MCG: 100 TABLET ORAL at 06:52

## 2020-09-22 RX ADMIN — ATORVASTATIN CALCIUM SCH MG: 40 TABLET, FILM COATED ORAL at 00:13

## 2020-09-22 RX ADMIN — MULTIVITAMIN TABLET SCH TAB: TABLET at 10:36

## 2020-09-22 RX ADMIN — Medication SCH MG: at 10:36

## 2020-09-22 RX ADMIN — MIRTAZAPINE SCH: 15 TABLET, FILM COATED ORAL at 21:49

## 2020-09-22 RX ADMIN — TAMSULOSIN HYDROCHLORIDE SCH MG: 0.4 CAPSULE ORAL at 06:53

## 2020-09-22 RX ADMIN — AMPICILLIN SODIUM SCH MLS/HR: 2 INJECTION, POWDER, FOR SOLUTION INTRAMUSCULAR; INTRAVENOUS at 10:34

## 2020-09-22 RX ADMIN — AMPICILLIN SODIUM SCH MLS/HR: 2 INJECTION, POWDER, FOR SOLUTION INTRAMUSCULAR; INTRAVENOUS at 21:53

## 2020-09-22 RX ADMIN — ACETAMINOPHEN PRN MG: 500 TABLET, FILM COATED ORAL at 00:12

## 2020-09-22 RX ADMIN — AMPICILLIN SODIUM SCH MLS/HR: 2 INJECTION, POWDER, FOR SOLUTION INTRAMUSCULAR; INTRAVENOUS at 05:00

## 2020-09-22 RX ADMIN — FERROUS SULFATE TAB EC 324 MG (65 MG FE EQUIVALENT) SCH MG: 324 (65 FE) TABLET DELAYED RESPONSE at 10:36

## 2020-09-22 RX ADMIN — ACETAMINOPHEN PRN MG: 500 TABLET, FILM COATED ORAL at 18:53

## 2020-09-22 RX ADMIN — NITROGLYCERIN SCH INCH: 20 OINTMENT TOPICAL at 18:51

## 2020-09-22 RX ADMIN — ASPIRIN 81 MG SCH MG: 81 TABLET ORAL at 10:36

## 2020-09-22 NOTE — PN
Progress Note, Physician


Chief Complaint: 





Sepsis


JO ANN


UTI


Bacteremia


Dysphagia


History of Present Illness: 





Mr. Lovelace is an 79 year old male BIBA from [facility] for 

hypotension (SBP in the 70's). He has a past medical history of hypertension, 

CHF, aortic stenosis s/p bioprosthetic valve replacement, CAD, prior stent, 

GERD,TIA,BPH and indwelling swenson. 








eventful weekend with chest pain 


transfered to ICU on heparin drip, dc'd now


Elevated trops-peaked already, now trending down


Seen by Cardiology


Conservative care


Refuses most of his medications





9/22/20:


NAD


Denies any pain, SOB





- Current Medication List


Current Medications: 


Active Medications





Acetaminophen (Tylenol -)  1,000 mg PO Q6H PRN


   PRN Reason: PAIN LEVEL 1-5


   Last Admin: 09/22/20 06:56 Dose:  1,000 mg


   Documented by: 


Albuterol Sulfate (Ventolin Hfa Inhaler -)  2 puff IH TID PRN


   PRN Reason: ASTHMA


Ascorbic Acid (Vitamin C -)  250 mg PO DAILY [facility]


   Last Admin: 09/22/20 10:36 Dose:  250 mg


   Documented by: 


Aspirin (Asa -)  81 mg PO DAILY [facility]


   Last Admin: 09/22/20 10:36 Dose:  81 mg


   Documented by: 


Atorvastatin Calcium (Lipitor -)  40 mg PO HS [facility]


   Last Admin: 09/22/20 00:13 Dose:  40 mg


   Documented by: 


Famotidine (Pepcid -)  20 mg PO DAILY [facility]


   Last Admin: 09/22/20 10:36 Dose:  20 mg


   Documented by: 


Ferrous Sulfate (Feosol -)  325 mg PO DAILY [facility]


   Last Admin: 09/22/20 10:36 Dose:  325 mg


   Documented by: 


Ampicillin Sodium 2 gm/ Sodium (Chloride)  100 mls @ 200 mls/hr IVPB Q4H-IV ELLEN;

Protocol


   Last Admin: 09/22/20 14:15 Dose:  200 mls/hr


   Documented by: 


Cefepime HCl 2 gm/ Dextrose  100 mls @ 200 mls/hr IVPB Q8H-IV ELLEN; Protocol


   Last Admin: 09/22/20 10:34 Dose:  200 mls/hr


   Documented by: 


Dextrose/Sodium Chloride (D5-1/2ns -)  1,000 mls @ 75 mls/hr IV ASDIR [facility]


   Last Admin: 09/21/20 22:00 Dose:  75 mls/hr


   Documented by: 


Levothyroxine Sodium (Synthroid -)  100 mcg PO ACBK [facility]


   Last Admin: 09/22/20 06:52 Dose:  100 mcg


   Documented by: 


Magnesium Hydroxide (Milk Of Magnesia -)  30 ml PO DAILY PRN


   PRN Reason: CONSTIPATION


Mirtazapine (Remeron -)  15 mg PO [facility]


   Last Admin: 09/22/20 00:13 Dose:  15 mg


   Documented by: 


Multivitamins/Minerals/Vitamin C (Tab-A-Vit -)  1 tab PO DAILY [facility]


   Last Admin: 09/22/20 10:36 Dose:  1 tab


   Documented by: 


Nitroglycerin (Nitro-Bid 2% Paste -)  0.5 inch TD Q6H [facility]


   Last Admin: 09/22/20 14:00 Dose:  0.5 inch


   Documented by: 


Senna (Senna -)  2 tab PO [facility]


   Last Admin: 09/21/20 21:25 Dose:  Not Given


   Documented by: 


Tamsulosin HCl (Flomax -)  0.4 mg PO DAILY@0830 [facility]


   Last Admin: 09/22/20 15:13 Dose:  Not Given


   Documented by: 











- Objective


Vital Signs: 


                                   Vital Signs











Temperature  97.7 F   09/22/20 14:52


 


Pulse Rate  85   09/22/20 14:52


 


Respiratory Rate  11   09/22/20 14:52


 


Blood Pressure  135/86   09/22/20 14:52


 


O2 Sat by Pulse Oximetry (%)  100   09/22/20 14:52











Constitutional: Yes: Well Nourished, No Distress, Calm


Cardiovascular: Yes: Regular Rate and Rhythm


Respiratory: Yes: Regular, CTA Bilaterally


Gastrointestinal: Yes: Normal Bowel Sounds, Soft


Genitourinary: Yes: Swenson Present


Musculoskeletal: Yes: Muscle Weakness


Extremities: Yes: WNL


Edema: No


Peripheral Pulses WNL: Yes


Neurological: Yes: Alert, Oriented


Psychiatric: Yes: Alert, Oriented


Labs: 


                                    CBC, BMP





                                 09/21/20 05:15 





                                 09/21/20 05:15 





                                    INR, PTT











INR  0.95  (0.83-1.09)   09/07/20  22:50    














Problem List





- Problems


(1) Chronic retention of urine


Assessment/Plan: 


-Continue swenson cath


Problems reviewed: Yes   


Code(s): R33.9 - RETENTION OF URINE, UNSPECIFIED   





(2) JO ANN (acute kidney injury)


Assessment/Plan: 


-Nephrology consult


-Renal U/S


-UC pseudomonas


-Cr at baseline


-Continue IVF


Problems reviewed: Yes   


Code(s): N17.9 - ACUTE KIDNEY FAILURE, UNSPECIFIED   





(3) Hyperkalemia


Assessment/Plan: 


-K hemolyzed yesterday


-repeat CMP in AM


-Nephrology on board


Problems reviewed: Yes   


Code(s): E87.5 - HYPERKALEMIA   





(4) Sepsis


Assessment/Plan: 


-ID consult


-IV Ampicillin + Cefepime


-No Lactic acidosis


-Afebrile


-Cultures:


                                  Microbiology





09/10/20 09:37   Blood - Peripheral Venous   Blood Culture - Final


                            NO GROWTH AFTER 5 DAYS INCUBATION


09/09/20 17:15   Blood - Peripheral Venous   Blood Culture - Final


                            NO GROWTH AFTER 5 DAYS INCUBATION


09/09/20 17:30   Blood - Peripheral Venous   Blood Culture - Final


                            NO GROWTH AFTER 5 DAYS INCUBATION


09/09/20 22:25   Urine - Urine Swenson   Urine Culture - Final


                            Pseudomonas Aeruginosa


09/07/20 22:50   Blood - Peripheral Venous   Blood Culture - Final


                            Group D Strep Or Entero Coccus


                            Pseudomonas Species


09/07/20 22:50   Blood - Peripheral Venous   Blood Culture - Final


                            Enterococcus Faecalis


                            Pseudomonas Aeruginosa


09/08/20 00:27   Urine - Urine - Catheterized   Urine Culture - Final


                            Contaminated: Please Repeat














Problems reviewed: Yes   


Code(s): A41.9 - SEPSIS, UNSPECIFIED ORGANISM   





(5) Failure to thrive


Assessment/Plan: 


-Add multivitamin


-Ensure pudding + Magic cup


-Seen by Speech pathology


-MBS- Mod to severe stasis with puree. Aspiration on thin liquid


-Dysphagia puree thinned out with nectar thick liquids





Problems reviewed: Yes   


Code(s): RBK0789 -    





(6) Malnutrition


Assessment/Plan: 


-Multivitamin


-Mirtazapine


Problems reviewed: Yes   


Code(s): E46 - UNSPECIFIED PROTEIN-CALORIE MALNUTRITION   


Qualifiers: 


   Protein-calorie malnutrition severity: severe 





(7) Bacteremia


Assessment/Plan: 


-Cultures:


                                  Microbiology





09/10/20 09:37   Blood - Peripheral Venous   Blood Culture - Final


                            NO GROWTH AFTER 5 DAYS INCUBATION


09/09/20 17:15   Blood - Peripheral Venous   Blood Culture - Final


                            NO GROWTH AFTER 5 DAYS INCUBATION


09/09/20 17:30   Blood - Peripheral Venous   Blood Culture - Final


                            NO GROWTH AFTER 5 DAYS INCUBATION


09/09/20 22:25   Urine - Urine Swenson   Urine Culture - Final


                            Pseudomonas Aeruginosa


09/07/20 22:50   Blood - Peripheral Venous   Blood Culture - Final


                            Group D Strep Or Entero Coccus


                            Pseudomonas Species


09/07/20 22:50   Blood - Peripheral Venous   Blood Culture - Final


                            Enterococcus Faecalis


                            Pseudomonas Aeruginosa


09/08/20 00:27   Urine - Urine - Catheterized   Urine Culture - Final


                            Contaminated: Please Repeat





-rest as above


Problems reviewed: Yes   


Code(s): R78.81 - BACTEREMIA   





(8) Chest pain


Assessment/Plan: 


-Cardiology consult appreciated


-Conservative measures


-Tele monitor


-Nitro patch


-Echo reviewed


Problems reviewed: Yes   


Code(s): R07.9 - CHEST PAIN, UNSPECIFIED   





(9) Elevated troponin


Assessment/Plan: 


as above


Problems reviewed: Yes   


Code(s): R79.89 - OTHER SPECIFIED ABNORMAL FINDINGS OF BLOOD CHEMISTRY   





(10) NSTEMI (non-ST elevated myocardial infarction)


Assessment/Plan: 


as above


Problems reviewed: Yes   


Code(s): I21.4 - NON-ST ELEVATION (NSTEMI) MYOCARDIAL INFARCTION   





(11) Abdominal pain


Assessment/Plan: 


-2/2 to constipation


-Milk of magnesia


-CTAP reviewed-unremarkable except constipation


Problems reviewed: Yes   


Code(s): R10.9 - UNSPECIFIED ABDOMINAL PAIN   





(12) Constipation


Assessment/Plan: 


-Continue Senna 2 tabs HS


-MOM PRN


Problems reviewed: Yes   


Code(s): K59.00 - CONSTIPATION, UNSPECIFIED   





Assessment/Plan





See problem list

## 2020-09-22 NOTE — PN
Progress Note, SLP





- Note


Progress Note: 


                                Selected Entries











  09/19/20 09/19/20 09/19/20





  09:00 09:19 13:16


 


Breakfast 25% NPO NPO


 


Diet Tolerated   


 


Lunch  NPO NPO


 


Supper   


 


Temperature   


 


Pulse Rate   


 


Blood Pressure   


 


O2 Sat by Pulse   





Oximetry (%)   


 


Oxygen Delivery   





Method   














  09/19/20 09/20/20 09/20/20





  20:00 10:00 14:22


 


Breakfast NPO 50% 


 


Diet Tolerated Poor Poor 


 


Lunch NPO  75%


 


Supper 25%  


 


Temperature   


 


Pulse Rate   


 


Blood Pressure   


 


O2 Sat by Pulse   





Oximetry (%)   


 


Oxygen Delivery   





Method   














  09/21/20 09/21/20 09/22/20





  15:00 21:47 00:00


 


Breakfast 25%  


 


Diet Tolerated Poor Refused 


 


Lunch 75%  


 


Supper   


 


Temperature   


 


Pulse Rate   88


 


Blood Pressure   140/73


 


O2 Sat by Pulse   





Oximetry (%)   


 


Oxygen Delivery   





Method   














  09/22/20 09/22/20 09/22/20





  04:00 09:00 09:41


 


Breakfast   75%


 


Diet Tolerated   


 


Lunch   


 


Supper   


 


Temperature 98.2 F  


 


Pulse Rate 86  


 


Blood Pressure 159/87  


 


O2 Sat by Pulse  100 





Oximetry (%)   


 


Oxygen Delivery  Room Air 





Method   














  09/22/20





  10:40


 


Breakfast 


 


Diet Tolerated 


 


Lunch 


 


Supper 


 


Temperature 


 


Pulse Rate 95 H


 


Blood Pressure 127/77


 


O2 Sat by Pulse 100





Oximetry (%) 


 


Oxygen Delivery 





Method 








                                Laboratory Tests











  09/18/20 09/19/20 09/20/20





  07:35 10:45 06:00


 


WBC  13.1 H  10.7 H  11.7 H














  09/21/20





  05:15


 


WBC  9.3











MBS 9/18- SILENT ASPIRATION ON THIN LIQUID


Placed on Dys chopped/nectar on 18th.


Chest pain over weekend, now in ICU, on puree/no liquid restrictions.


RD contacted me to review.


Suggest-Downgrade to [place] thick due to silent aspiration, and trial of 

dysphagia chopped.


Supplements b/n meals.

## 2020-09-22 NOTE — PN
Progress Note, Physician


History of Present Illness: 





Pt seen and examined at bedside. He is easily agitated. 





- Current Medication List


Current Medications: 


Active Medications





Acetaminophen (Tylenol -)  1,000 mg PO Q6H PRN


   PRN Reason: PAIN LEVEL 1-5


   Last Admin: 09/22/20 06:56 Dose:  1,000 mg


   Documented by: 


Albuterol Sulfate (Ventolin Hfa Inhaler -)  2 puff IH TID PRN


   PRN Reason: ASTHMA


Ascorbic Acid (Vitamin C -)  250 mg PO DAILY [facility]


   Last Admin: 09/22/20 10:36 Dose:  250 mg


   Documented by: 


Aspirin (Asa -)  81 mg PO DAILY [facility]


   Last Admin: 09/22/20 10:36 Dose:  81 mg


   Documented by: 


Atorvastatin Calcium (Lipitor -)  40 mg PO [facility]


   Last Admin: 09/22/20 00:13 Dose:  40 mg


   Documented by: 


Famotidine (Pepcid -)  20 mg PO DAILY [facility]


   Last Admin: 09/22/20 10:36 Dose:  20 mg


   Documented by: 


Ferrous Sulfate (Feosol -)  325 mg PO DAILY [facility]


   Last Admin: 09/22/20 10:36 Dose:  325 mg


   Documented by: 


Ampicillin Sodium 2 gm/ Sodium (Chloride)  100 mls @ 200 mls/hr IVPB Q4H-IV [facility];

Protocol


   Last Admin: 09/22/20 10:34 Dose:  200 mls/hr


   Documented by: 


Cefepime HCl 2 gm/ Dextrose  100 mls @ 200 mls/hr IVPB Q8H-IV [facility]; Protocol


   Last Admin: 09/22/20 10:34 Dose:  200 mls/hr


   Documented by: 


Dextrose/Sodium Chloride (D5-1/2ns -)  1,000 mls @ 75 mls/hr IV ASDIR [facility]


   Last Admin: 09/21/20 22:00 Dose:  75 mls/hr


   Documented by: 


Levothyroxine Sodium (Synthroid -)  100 mcg PO ACBK [facility]


   Last Admin: 09/22/20 06:52 Dose:  100 mcg


   Documented by: 


Magnesium Hydroxide (Milk Of Magnesia -)  30 ml PO DAILY PRN


   PRN Reason: CONSTIPATION


Mirtazapine (Remeron -)  15 mg PO [facility]


   Last Admin: 09/22/20 00:13 Dose:  15 mg


   Documented by: 


Multivitamins/Minerals/Vitamin C (Tab-A-Vit -)  1 tab PO DAILY [facility]


   Last Admin: 09/22/20 10:36 Dose:  1 tab


   Documented by: 


Nitroglycerin (Nitro-Bid 2% Paste -)  0.5 inch TD Q6H [facility]


   Last Admin: 09/22/20 06:52 Dose:  0.5 inch


   Documented by: 


Senna (Senna -)  2 tab PO HS [facility]


   Last Admin: 09/21/20 21:25 Dose:  Not Given


   Documented by: 


Tamsulosin HCl (Flomax -)  0.4 mg PO DAILY@0830 [facility]


   Last Admin: 09/22/20 06:53 Dose:  0.4 mg


   Documented by: 











- Objective


Vital Signs: 


                                   Vital Signs











Temperature  98.2 F   09/22/20 04:00


 


Pulse Rate  86   09/22/20 12:06


 


Respiratory Rate  24 H  09/22/20 12:06


 


Blood Pressure  135/86   09/22/20 12:06


 


O2 Sat by Pulse Oximetry (%)  98   09/22/20 12:06











Constitutional: Yes: Calm


Eyes: Yes: Conjunctiva Clear


HENT: Yes: Atraumatic


Cardiovascular: Yes: S1, S2


Respiratory: Yes: CTA Bilaterally


Gastrointestinal: Yes: Soft


Genitourinary: Yes: Incontinence


Psychiatric: Yes: Agitated


Labs: 


                                    CBC, BMP





                                 09/21/20 05:15 





                                 09/21/20 05:15 





                                    INR, PTT











INR  0.95  (0.83-1.09)   09/07/20  22:50    














Problem List





- Problems


(1) JO ANN (acute kidney injury)


Code(s): N17.9 - ACUTE KIDNEY FAILURE, UNSPECIFIED   





(2) Hyperkalemia


Code(s): E87.5 - HYPERKALEMIA   





(3) Sepsis


Code(s): A41.9 - SEPSIS, UNSPECIFIED ORGANISM   





Assessment/Plan


                               Current Medications











Generic Name Dose Route Start Last Admin





  Trade Name Freq  PRN Reason Stop Dose Admin


 


Acetaminophen  1,000 mg  09/19/20 05:52  09/22/20 06:56





  Tylenol -  PO   1,000 mg





  Q6H PRN   Administration





  PAIN LEVEL 1-5  


 


Albuterol Sulfate  2 puff  09/19/20 05:52 





  Ventolin Hfa Inhaler -  IH  





  TID PRN  





  ASTHMA  


 


Ascorbic Acid  250 mg  09/19/20 10:00  09/22/20 10:36





  Vitamin C -  PO   250 mg





  DAILY [facility]   Administration


 


Aspirin  81 mg  09/19/20 10:00  09/22/20 10:36





  Asa -  PO   81 mg





  DAILY [facility]   Administration


 


Atorvastatin Calcium  40 mg  09/19/20 22:00  09/22/20 00:13





  Lipitor -  PO   40 mg





  HS [facility]   Administration


 


Famotidine  20 mg  09/19/20 10:00  09/22/20 10:36





  Pepcid -  PO   20 mg





  DAILY [facility]   Administration


 


Ferrous Sulfate  325 mg  09/19/20 10:00  09/22/20 10:36





  Feosol -  PO   325 mg





  DAILY [facility]   Administration


 


Ampicillin Sodium 2 gm/ Sodium  100 mls @ 200 mls/hr  09/19/20 06:00  09/22/20 

10:34





  Chloride  IVPB   200 mls/hr





  Q4H-IV [facility]   Administration





  Protocol  


 


Cefepime HCl 2 gm/ Dextrose  100 mls @ 200 mls/hr  09/19/20 10:00  09/22/20 

10:34





  IVPB   200 mls/hr





  Q8H-IV [facility]   Administration





  Protocol  


 


Dextrose/Sodium Chloride  1,000 mls @ 75 mls/hr  09/21/20 22:15  09/21/20 22:00





  D5-1/2ns -  IV   75 mls/hr





  ASDIR [facility]   Administration


 


Levothyroxine Sodium  100 mcg  09/19/20 07:00  09/22/20 06:52





  Synthroid -  PO   100 mcg





  ACBK [facility]   Administration


 


Magnesium Hydroxide  30 ml  09/19/20 05:52 





  Milk Of Magnesia -  PO  





  DAILY PRN  





  CONSTIPATION  


 


Mirtazapine  15 mg  09/19/20 22:00  09/22/20 00:13





  Remeron -  PO   15 mg





  HS [facility]   Administration


 


Multivitamins/Minerals/Vitamin C  1 tab  09/19/20 10:00  09/22/20 10:36





  Tab-A-Vit -  PO   1 tab





  DAILY [facility]   Administration


 


Nitroglycerin  0.5 inch  09/19/20 18:00  09/22/20 06:52





  Nitro-Bid 2% Paste -  TD   0.5 inch





  Q6H [facility]   Administration


 


Senna  2 tab  09/19/20 22:00  09/21/20 21:25





  Senna -  PO   Not Given





  HS [facility]  


 


Tamsulosin HCl  0.4 mg  09/19/20 08:30  09/22/20 06:53





  Flomax -  PO   0.4 mg





  DAILY@0830 [facility]   Administration














Impression


1. JO ANN


2. sepsis


3. hyperkalemia


4. hypotension


5. chf


6. bph


7. cad


8. tia


9. gerd


10. hypernatremia





Plan


- check bmp if he agrees


- can cont fluids for now


- monitor volume status


- cardio input appreciated


- encourage po intake

## 2020-09-22 NOTE — PN
Progress Note (short form)





- Note


Progress Note: 





s: no cp sob palps dizzy


                              Current Medications











Generic Name Dose Route Start Last Admin





  Trade Name Freq  PRN Reason Stop Dose Admin


 


Acetaminophen  1,000 mg  09/19/20 05:52  09/22/20 06:56





  Tylenol -  PO   1,000 mg





  Q6H PRN   Administration





  PAIN LEVEL 1-5  


 


Albuterol Sulfate  2 puff  09/19/20 05:52 





  Ventolin Hfa Inhaler -  IH  





  TID PRN  





  ASTHMA  


 


Ascorbic Acid  250 mg  09/19/20 10:00  09/22/20 10:36





  Vitamin C -  PO   250 mg





  DAILY [facility]   Administration


 


Aspirin  81 mg  09/19/20 10:00  09/22/20 10:36





  Asa -  PO   81 mg





  DAILY [facility]   Administration


 


Atorvastatin Calcium  40 mg  09/19/20 22:00  09/22/20 00:13





  Lipitor -  PO   40 mg





  HS [facility]   Administration


 


Famotidine  20 mg  09/19/20 10:00  09/22/20 10:36





  Pepcid -  PO   20 mg





  DAILY [facility]   Administration


 


Ferrous Sulfate  325 mg  09/19/20 10:00  09/22/20 10:36





  Feosol -  PO   325 mg





  DAILY [facility]   Administration


 


Ampicillin Sodium 2 gm/ Sodium  100 mls @ 200 mls/hr  09/19/20 06:00  09/22/20 

10:34





  Chloride  IVPB   200 mls/hr





  Q4H-IV [facility]   Administration





  Protocol  


 


Cefepime HCl 2 gm/ Dextrose  100 mls @ 200 mls/hr  09/19/20 10:00  09/22/20 

10:34





  IVPB   200 mls/hr





  Q8H-IV [facility]   Administration





  Protocol  


 


Dextrose/Sodium Chloride  1,000 mls @ 75 mls/hr  09/21/20 22:15  09/21/20 22:00





  D5-1/2ns -  IV   75 mls/hr





  ASDIR [facility]   Administration


 


Levothyroxine Sodium  100 mcg  09/19/20 07:00  09/22/20 06:52





  Synthroid -  PO   100 mcg





  ACBK [facility]   Administration


 


Magnesium Hydroxide  30 ml  09/19/20 05:52 





  Milk Of Magnesia -  PO  





  DAILY PRN  





  CONSTIPATION  


 


Mirtazapine  15 mg  09/19/20 22:00  09/22/20 00:13





  Remeron -  PO   15 mg





  HS [facility]   Administration


 


Multivitamins/Minerals/Vitamin C  1 tab  09/19/20 10:00  09/22/20 10:36





  Tab-A-Vit -  PO   1 tab





  DAILY [facility]   Administration


 


Nitroglycerin  0.5 inch  09/19/20 18:00  09/22/20 06:52





  Nitro-Bid 2% Paste -  TD   0.5 inch





  Q6H [facility]   Administration


 


Senna  2 tab  09/19/20 22:00  09/21/20 21:25





  Senna -  PO   Not Given





  HS [facility]  


 


Tamsulosin HCl  0.4 mg  09/19/20 08:30  09/22/20 06:53





  Flomax -  PO   0.4 mg





  DAILY@0830 [facility]   Administration








                                   Vital Signs











 Period  Temp  Pulse  Resp  BP Sys/Villa  Pulse Ox


 


 Last 24 Hr  98.2 F  77-95  16-25  119-159/66-87  














nad no jvd


rrr s1s2 no mrg


cta bl nl eff


1+ le edema bl, no c/c


abd nt nd pos bs


no jaundice diaphoresis


+dp pt no carotid bruits





                                        


                                        


echo 8/2020:  lvef 35%, global hk, nl rv, lae, nl bio avr, mild mr, mild tr, 

rvsp 38





echo 9/2020 EF 35-40%, mod to severe global hypokinesis of LV, severe 

hypokinesis of apical anterior wall and apex, LA mod dilated, mild to mod MAC, 

mild to mod MR, mild TR, bio AVR, well seated prosthetic valve, mild AR





ecg: sr, rbbb, PVCs





cxr: clear





tele: sr, PVCs








a/p:  79 m hx htn, syst chf, as s/p remote bio avr, cad s/p remote pci, tia, 

presented with JO ANN, sepsis with chest pain, elevated trops





chest pain, elevated troponin, CAD, remote PCI


- patient unable to give further history regarding chest pain, denies pain now


- EKG unchanged from prior


- may be 2/2 demand in setting of underlying anemia, JO ANN, vs NSTEMI from plaque 

rupture


- also with wall motion abnormalities on echo here from 9/11/2020, known u

nderlying CAD


- given poor prognosis and desire to minimize invasive procedures, would defer 

ischemic workup at this point and continue medical management for underlying CAD


- cont aspirin, statin





chronic syst chf:


- held coreg for low BPs, not on ACE/ARB due to JO ANN


- appears euvolemic, hold diuretics





hypotension, septic shock, UTI


- bacteremia 2/2 UTI, bcx no growth from 9/10


- improved with IVF, abx


- manage per pmd, ID





JO ANN


- improving with IVF





htn:


- bp stable off meds





avr:


-stable, nl fcn on echo

## 2020-09-23 LAB
ALBUMIN SERPL-MCNC: 1.4 G/DL (ref 3.4–5)
ALP SERPL-CCNC: 138 U/L (ref 45–117)
ALT SERPL-CCNC: 11 U/L (ref 13–61)
ANION GAP SERPL CALC-SCNC: 6 MMOL/L (ref 8–16)
AST SERPL-CCNC: 37 U/L (ref 15–37)
BASOPHILS # BLD: 1.2 % (ref 0–2)
BILIRUB SERPL-MCNC: 0.1 MG/DL (ref 0.2–1)
BUN SERPL-MCNC: 21.4 MG/DL (ref 7–18)
CALCIUM SERPL-MCNC: 7.8 MG/DL (ref 8.5–10.1)
CHLORIDE SERPL-SCNC: 110 MMOL/L (ref 98–107)
CO2 SERPL-SCNC: 20 MMOL/L (ref 21–32)
CREAT SERPL-MCNC: 1.3 MG/DL (ref 0.55–1.3)
DEPRECATED RDW RBC AUTO: 18.4 % (ref 11.9–15.9)
EOSINOPHIL # BLD: 2.3 % (ref 0–4.5)
GLUCOSE SERPL-MCNC: 78 MG/DL (ref 74–106)
HCT VFR BLD CALC: 30.2 % (ref 35.4–49)
HGB BLD-MCNC: 9.8 GM/DL (ref 11.7–16.9)
LYMPHOCYTES # BLD: 7.6 % (ref 8–40)
MCH RBC QN AUTO: 26.4 PG (ref 25.7–33.7)
MCHC RBC AUTO-ENTMCNC: 32.4 G/DL (ref 32–35.9)
MCV RBC: 81.4 FL (ref 80–96)
MONOCYTES # BLD AUTO: 8.7 % (ref 3.8–10.2)
NEUTROPHILS # BLD: 80.2 % (ref 42.8–82.8)
PLATELET # BLD AUTO: 212 K/MM3 (ref 134–434)
PMV BLD: 8.4 FL (ref 7.5–11.1)
POTASSIUM SERPLBLD-SCNC: 4.8 MMOL/L (ref 3.5–5.1)
PROT SERPL-MCNC: 6 G/DL (ref 6.4–8.2)
RBC # BLD AUTO: 3.71 M/MM3 (ref 4–5.6)
SODIUM SERPL-SCNC: 136 MMOL/L (ref 136–145)
WBC # BLD AUTO: 7.1 K/MM3 (ref 4–10)

## 2020-09-23 RX ADMIN — CEFEPIME HYDROCHLORIDE SCH MLS/HR: 2 INJECTION, POWDER, FOR SOLUTION INTRAVENOUS at 09:03

## 2020-09-23 RX ADMIN — SENNOSIDES SCH TAB: 8.6 TABLET, FILM COATED ORAL at 21:18

## 2020-09-23 RX ADMIN — Medication SCH MG: at 09:02

## 2020-09-23 RX ADMIN — CEFEPIME HYDROCHLORIDE SCH MLS/HR: 2 INJECTION, POWDER, FOR SOLUTION INTRAVENOUS at 01:34

## 2020-09-23 RX ADMIN — AMPICILLIN SODIUM SCH: 2 INJECTION, POWDER, FOR SOLUTION INTRAMUSCULAR; INTRAVENOUS at 17:17

## 2020-09-23 RX ADMIN — NITROGLYCERIN SCH: 20 OINTMENT TOPICAL at 06:32

## 2020-09-23 RX ADMIN — AMPICILLIN SODIUM SCH MLS/HR: 2 INJECTION, POWDER, FOR SOLUTION INTRAMUSCULAR; INTRAVENOUS at 09:32

## 2020-09-23 RX ADMIN — DEXTROSE AND SODIUM CHLORIDE SCH: 5; 450 INJECTION, SOLUTION INTRAVENOUS at 00:33

## 2020-09-23 RX ADMIN — MORPHINE SULFATE PRN MG: 2 INJECTION, SOLUTION INTRAMUSCULAR; INTRAVENOUS at 00:42

## 2020-09-23 RX ADMIN — FERROUS SULFATE TAB EC 324 MG (65 MG FE EQUIVALENT) SCH MG: 324 (65 FE) TABLET DELAYED RESPONSE at 09:03

## 2020-09-23 RX ADMIN — AMPICILLIN SODIUM SCH: 2 INJECTION, POWDER, FOR SOLUTION INTRAMUSCULAR; INTRAVENOUS at 21:18

## 2020-09-23 RX ADMIN — TAMSULOSIN HYDROCHLORIDE SCH MG: 0.4 CAPSULE ORAL at 09:02

## 2020-09-23 RX ADMIN — CEFEPIME HYDROCHLORIDE SCH: 2 INJECTION, POWDER, FOR SOLUTION INTRAVENOUS at 17:17

## 2020-09-23 RX ADMIN — NITROGLYCERIN SCH INCH: 20 OINTMENT TOPICAL at 17:32

## 2020-09-23 RX ADMIN — AMPICILLIN SODIUM SCH: 2 INJECTION, POWDER, FOR SOLUTION INTRAMUSCULAR; INTRAVENOUS at 06:31

## 2020-09-23 RX ADMIN — FAMOTIDINE SCH MG: 20 TABLET ORAL at 09:03

## 2020-09-23 RX ADMIN — ATORVASTATIN CALCIUM SCH MG: 40 TABLET, FILM COATED ORAL at 21:18

## 2020-09-23 RX ADMIN — AMPICILLIN SODIUM SCH MLS/HR: 2 INJECTION, POWDER, FOR SOLUTION INTRAMUSCULAR; INTRAVENOUS at 02:20

## 2020-09-23 RX ADMIN — MULTIVITAMIN TABLET SCH TAB: TABLET at 09:03

## 2020-09-23 RX ADMIN — LEVOTHYROXINE SODIUM SCH: 100 TABLET ORAL at 06:32

## 2020-09-23 RX ADMIN — NITROGLYCERIN SCH INCH: 20 OINTMENT TOPICAL at 11:28

## 2020-09-23 RX ADMIN — MORPHINE SULFATE PRN MG: 2 INJECTION, SOLUTION INTRAMUSCULAR; INTRAVENOUS at 08:49

## 2020-09-23 RX ADMIN — AMPICILLIN SODIUM SCH MLS/HR: 2 INJECTION, POWDER, FOR SOLUTION INTRAMUSCULAR; INTRAVENOUS at 15:07

## 2020-09-23 RX ADMIN — NITROGLYCERIN SCH: 20 OINTMENT TOPICAL at 00:25

## 2020-09-23 RX ADMIN — MIRTAZAPINE SCH MG: 15 TABLET, FILM COATED ORAL at 21:18

## 2020-09-23 RX ADMIN — ASPIRIN 81 MG SCH MG: 81 TABLET ORAL at 09:03

## 2020-09-23 NOTE — EKG
Test Reason : 

Blood Pressure : ***/*** mmHG

Vent. Rate : 108 BPM     Atrial Rate : 108 BPM

   P-R Int : 168 ms          QRS Dur : 164 ms

    QT Int : 408 ms       P-R-T Axes : 000 -89 069 degrees

   QTc Int : 546 ms

 

*** POOR DATA QUALITY, INTERPRETATION MAY BE ADVERSELY AFFECTED

SINUS TACHYCARDIA

LEFT AXIS DEVIATION

RIGHT BUNDLE BRANCH BLOCK

LATERAL INFARCT (CITED ON OR BEFORE 02-AUG-2020)

INFERIOR INFARCT (CITED ON OR BEFORE 02-AUG-2020)

ABNORMAL ECG

WHEN COMPARED WITH ECG OF 07-SEP-2020 23:55,

RIGHT BUNDLE BRANCH BLOCK HAS REPLACED NON-SPECIFIC INTRA-VENTRICULAR

CONDUCTION BLOCK

Confirmed by Robert Lozano ([id number]) on 9/23/2020 10:05:08 AM

 

Referred By:             Confirmed By:Robert Lozano

## 2020-09-23 NOTE — PN
Progress Note, Physician


History of Present Illness: 





Sepsis


JO ANN


UTI


Bacteremia


Dysphagia


History of Present Illness: 





Mr. Lovelace is an 79 year old male BIBA from [facility] for 

hypotension (SBP in the 70's). He has a past medical history of hypertension, 

CHF, aortic stenosis s/p bioprosthetic valve replacement, CAD, prior stent, 

GERD,TIA,BPH and indwelling swenson. 








eventful weekend with chest pain 


transfered to ICU on heparin drip, dc'd now


Elevated trops-peaked already, now trending down


Seen by Cardiology


Conservative care


Refuses most of his medications





9/22/20:


NAD


Denies any pain, SOB





9/23


REFUSING IV





- Current Medication List


Current Medications: 


Active Medications





Acetaminophen (Tylenol -)  1,000 mg PO Q6H PRN


   PRN Reason: PAIN LEVEL 1-5


Albuterol Sulfate (Ventolin Hfa Inhaler -)  2 puff IH TID PRN


   PRN Reason: ASTHMA


Ascorbic Acid (Vitamin C -)  250 mg PO DAILY [facility]


Aspirin (Asa -)  81 mg PO DAILY [facility]


Atorvastatin Calcium (Lipitor -)  40 mg PO HS [facility]


Famotidine (Pepcid -)  20 mg PO DAILY [facility]


Ferrous Sulfate (Feosol -)  325 mg PO DAILY [facility]


Ampicillin Sodium 2 gm/ Sodium (Chloride)  100 mls @ 200 mls/hr IVPB Q4H-IV [facility];

Protocol


   Last Admin: 09/23/20 06:31 Dose:  Not Given


   Documented by: 


Cefepime HCl 2 gm/ Dextrose  100 mls @ 200 mls/hr IVPB Q8H-IV [facility]; Protocol


   Last Admin: 09/23/20 01:34 Dose:  200 mls/hr


   Documented by: 


Dextrose/Sodium Chloride (D5-1/2ns -)  1,000 mls @ 75 mls/hr IV ASDIR [facility]


   Last Admin: 09/23/20 01:22 Dose:  75 mls/hr


   Documented by: 


Levothyroxine Sodium (Synthroid -)  100 mcg PO ACBK [facility]


   Last Admin: 09/23/20 06:32 Dose:  Not Given


   Documented by: 


Magnesium Hydroxide (Milk Of Magnesia -)  30 ml PO DAILY PRN


   PRN Reason: CONSTIPATION


Mirtazapine (Remeron -)  15 mg PO HS [facility]


Morphine Sulfate (Morphine Sulfate)  1 mg IVPUSH Q6H PRN


   PRN Reason: PAIN LEVEL 6-10


   Last Admin: 09/23/20 00:42 Dose:  1 mg


   Documented by: 


Multivitamins/Minerals/Vitamin C (Tab-A-Vit -)  1 tab PO DAILY [facility]


Nitroglycerin (Nitro-Bid 2% Paste -)  0.5 inch TD Q6H [facility]


   Last Admin: 09/23/20 06:32 Dose:  Not Given


   Documented by: 


Senna (Senna -)  2 tab PO HS [facility]


Tamsulosin HCl (Flomax -)  0.4 mg PO DAILY@0830 [facility]











- Objective


Vital Signs: 


                                   Vital Signs











Temperature  97.5 F L  09/23/20 06:00


 


Pulse Rate  83   09/23/20 06:00


 


Respiratory Rate  18   09/23/20 06:00


 


Blood Pressure  142/79   09/23/20 06:00


 


O2 Sat by Pulse Oximetry (%)  98   09/23/20 06:00











Cardiovascular: Yes: S1, S2


Respiratory: Yes: Regular, CTA Bilaterally


Gastrointestinal: Yes: Normal Bowel Sounds, Soft.  No: Tenderness


Labs: 


                                    CBC, BMP





                                 09/21/20 05:15 





                                 09/21/20 05:15 





                                    INR, PTT











INR  0.95  (0.83-1.09)   09/07/20  22:50    














Problem List





- Problems


(1) Dysphagia


Code(s): R13.10 - DYSPHAGIA, UNSPECIFIED   





(2) Weakness


Code(s): R53.1 - WEAKNESS   





(3) JO ANN (acute kidney injury)


Code(s): N17.9 - ACUTE KIDNEY FAILURE, UNSPECIFIED   





(4) Chronic retention of urine


Code(s): R33.9 - RETENTION OF URINE, UNSPECIFIED   





(5) Sepsis


Code(s): A41.9 - SEPSIS, UNSPECIFIED ORGANISM   





Assessment/Plan





- Problems


(1) Chronic retention of urine


Assessment/Plan: 


-Continue swenson cath


Problems reviewed: Yes   


Code(s): R33.9 - RETENTION OF URINE, UNSPECIFIED   





(2) JO ANN (acute kidney injury)


Assessment/Plan: 


-Nephrology consult


-Renal U/S


-UC pseudomonas


-Cr at baseline


-REFUSING IVF--DC--LABS


Problems reviewed: Yes   


Code(s): N17.9 - ACUTE KIDNEY FAILURE, UNSPECIFIED   





(3) Hyperkalemia


Assessment/Plan: 


-K hemolyzed yesterday


-repeat CMP 


-Nephrology on board


Problems reviewed: Yes   


Code(s): E87.5 - HYPERKALEMIA   





(4) Sepsis


Assessment/Plan: 


-ID consult


-IV Ampicillin + Cefepime--IF PT AGREES


-No Lactic acidosis


-Afebrile


-Cultures:


                                  Microbiology





09/10/20 09:37   Blood - Peripheral Venous   Blood Culture - Final


                            NO GROWTH AFTER 5 DAYS INCUBATION


09/09/20 17:15   Blood - Peripheral Venous   Blood Culture - Final


                            NO GROWTH AFTER 5 DAYS INCUBATION


09/09/20 17:30   Blood - Peripheral Venous   Blood Culture - Final


                            NO GROWTH AFTER 5 DAYS INCUBATION


09/09/20 22:25   Urine - Urine Swenson   Urine Culture - Final


                            Pseudomonas Aeruginosa


09/07/20 22:50   Blood - Peripheral Venous   Blood Culture - Final


                            Group D Strep Or Entero Coccus


                            Pseudomonas Species


09/07/20 22:50   Blood - Peripheral Venous   Blood Culture - Final


                            Enterococcus Faecalis


                            Pseudomonas Aeruginosa


09/08/20 00:27   Urine - Urine - Catheterized   Urine Culture - Final


                            Contaminated: Please Repeat











PICC LINE


Problems reviewed: Yes   


Code(s): A41.9 - SEPSIS, UNSPECIFIED ORGANISM   





(5) Failure to thrive


Assessment/Plan: 


-Add multivitamin


-Ensure pudding + Magic cup


-Seen by Speech pathology


-MBS- Mod to severe stasis with puree. Aspiration on thin liquid


-Dysphagia puree thinned out with nectar thick liquids





Problems reviewed: Yes   


Code(s): [id number] -    





(6) Malnutrition


Assessment/Plan: 


-Multivitamin


-Mirtazapine


Problems reviewed: Yes   


Code(s): E46 - UNSPECIFIED PROTEIN-CALORIE MALNUTRITION   


Qualifiers: 


   Protein-calorie malnutrition severity: severe 





(7) Bacteremia


Assessment/Plan: 


-Cultures:


                                  Microbiology





09/10/20 09:37   Blood - Peripheral Venous   Blood Culture - Final


                            NO GROWTH AFTER 5 DAYS INCUBATION


09/09/20 17:15   Blood - Peripheral Venous   Blood Culture - Final


                            NO GROWTH AFTER 5 DAYS INCUBATION


09/09/20 17:30   Blood - Peripheral Venous   Blood Culture - Final


                            NO GROWTH AFTER 5 DAYS INCUBATION


09/09/20 22:25   Urine - Urine Swenson   Urine Culture - Final


                            Pseudomonas Aeruginosa


09/07/20 22:50   Blood - Peripheral Venous   Blood Culture - Final


                            Group D Strep Or Entero Coccus


                            Pseudomonas Species


09/07/20 22:50   Blood - Peripheral Venous   Blood Culture - Final


                            Enterococcus Faecalis


                            Pseudomonas Aeruginosa


09/08/20 00:27   Urine - Urine - Catheterized   Urine Culture - Final


                            Contaminated: Please Repeat





-rest as above


Problems reviewed: Yes   


Code(s): R78.81 - BACTEREMIA   





(8) Chest pain


Assessment/Plan: 


-Cardiology consult appreciated


-Conservative measures


-Tele monitor


-Nitro patch


-Echo reviewed


Problems reviewed: Yes   


Code(s): R07.9 - CHEST PAIN, UNSPECIFIED   





(9) Elevated troponin


Assessment/Plan: 


as above


Problems reviewed: Yes   


Code(s): R79.89 - OTHER SPECIFIED ABNORMAL FINDINGS OF BLOOD CHEMISTRY   





(10) NSTEMI (non-ST elevated myocardial infarction)


Assessment/Plan: 


as above


Problems reviewed: Yes   


Code(s): I21.4 - NON-ST ELEVATION (NSTEMI) MYOCARDIAL INFARCTION   





(11) Abdominal pain


Assessment/Plan: 


-2/2 to constipation


-Milk of magnesia


-CTAP reviewed-unremarkable except constipation


Problems reviewed: Yes   


Code(s): R10.9 - UNSPECIFIED ABDOMINAL PAIN   





(12) Constipation


Assessment/Plan: 


-Continue Senna 2 tabs HS


-MOM PRN


Problems reviewed: Yes   


Code(s): K59.00 - CONSTIPATION, UNSPECIFIED   





PALLIATIVE CARE CONSULT


DC PLANNING


PICC LINE

## 2020-09-23 NOTE — PN
Progress Note, SLP





- Note


Progress Note: 


                                Selected Entries











  09/19/20 09/19/20 09/19/20





  09:00 09:19 13:16


 


Breakfast 25% NPO NPO


 


Diet Tolerated   


 


Lunch  NPO NPO


 


Supper   


 


Temperature   


 


Pulse Rate   


 


Blood Pressure   


 


O2 Sat by Pulse   





Oximetry (%)   


 


Oxygen Delivery   





Method   














  09/19/20 09/20/20 09/20/20





  20:00 10:00 14:22


 


Breakfast NPO 50% 


 


Diet Tolerated Poor Poor 


 


Lunch NPO  75%


 


Supper 25%  


 


Temperature   


 


Pulse Rate   


 


Blood Pressure   


 


O2 Sat by Pulse   





Oximetry (%)   


 


Oxygen Delivery   





Method   














  09/21/20 09/21/20 09/22/20





  15:00 21:47 00:00


 


Breakfast 25%  


 


Diet Tolerated Poor Refused 


 


Lunch 75%  


 


Supper   


 


Temperature   


 


Pulse Rate   88


 


Blood Pressure   140/73


 


O2 Sat by Pulse   





Oximetry (%)   


 


Oxygen Delivery   





Method   














  09/22/20 09/22/20 09/22/20





  04:00 09:00 09:41


 


Breakfast   75%


 


Diet Tolerated   


 


Lunch   


 


Supper   


 


Temperature 98.2 F  


 


Pulse Rate 86  


 


Blood Pressure 159/87  


 


O2 Sat by Pulse  100 





Oximetry (%)   


 


Oxygen Delivery  Room Air 





Method   














  09/22/20





  10:40


 


Breakfast 


 


Diet Tolerated 


 


Lunch 


 


Supper 


 


Temperature 


 


Pulse Rate 95 H


 


Blood Pressure 127/77


 


O2 Sat by Pulse 100





Oximetry (%) 


 


Oxygen Delivery 





Method 








                                Laboratory Tests











  09/18/20 09/19/20 09/20/20





  07:35 10:45 06:00


 


WBC  13.1 H  10.7 H  11.7 H














  09/21/20





  05:15


 


WBC  9.3





                                Selected Entries











  09/22/20 09/22/20 09/23/20





  09:41 15:00 02:00


 


Breakfast 75% 75% 


 


Diet Tolerated   


 


Lunch 50% 50% 


 


Temperature   97.7 F


 


Pulse Rate   78


 


Blood Pressure   105/64














  09/23/20 09/23/20





  06:00 09:33


 


Breakfast  25%


 


Diet Tolerated  Poor


 


Lunch  


 


Temperature 97.5 F L 


 


Pulse Rate 83 


 


Blood Pressure 142/79 








                                Laboratory Tests











  09/21/20





  05:15


 


WBC  9.3














MBS 9/18- SILENT ASPIRATION ON THIN LIQUID


Downgraded to [place] thick due to silent aspiration, and upgrade trial of 

dysphagia chopped.


Supplements b/n meals.


Intermittent cooperation/po acceptance


Seen by palliative care- Plan is for [facility].

## 2020-09-23 NOTE — PN
Progress Note (short form)





- Note


Progress Note: 


cc: chest pain





s: no cp sob palps dizzy


                                        


                               Current Medications











Generic Name Dose Route Start Last Admin





  Trade Name Freq  PRN Reason Stop Dose Admin


 


Acetaminophen  1,000 mg  09/23/20 00:39 





  Tylenol -  PO  





  Q6H PRN  





  PAIN LEVEL 1-5  


 


Albuterol Sulfate  2 puff  09/23/20 00:39 





  Ventolin Hfa Inhaler -  IH  





  TID PRN  





  ASTHMA  


 


Ascorbic Acid  250 mg  09/23/20 10:00  09/23/20 09:02





  Vitamin C -  PO   250 mg





  DAILY [facility]   Administration


 


Aspirin  81 mg  09/23/20 10:00  09/23/20 09:03





  Asa -  PO   81 mg





  DAILY [facility]   Administration


 


Atorvastatin Calcium  40 mg  09/23/20 22:00 





  Lipitor -  PO  





  [facility]  


 


Famotidine  20 mg  09/23/20 10:00  09/23/20 09:03





  Pepcid -  PO   20 mg





  DAILY [facility]   Administration


 


Ferrous Sulfate  325 mg  09/23/20 10:00  09/23/20 09:03





  Feosol -  PO   325 mg





  DAILY [facility]   Administration


 


Ampicillin Sodium 2 gm/ Sodium  100 mls @ 200 mls/hr  09/23/20 02:00  09/23/20 

09:32





  Chloride  IVPB   200 mls/hr





  Q4H-IV [facility]   Administration





  Protocol  


 


Cefepime HCl 2 gm/ Dextrose  100 mls @ 200 mls/hr  09/23/20 02:00  09/23/20 

09:03





  IVPB   200 mls/hr





  Q8H-IV [facility]   Administration





  Protocol  


 


Levothyroxine Sodium  100 mcg  09/23/20 07:00  09/23/20 06:32





  Synthroid -  PO   Not Given





  ACBK [facility]  


 


Magnesium Hydroxide  30 ml  09/23/20 00:39 





  Milk Of Magnesia -  PO  





  DAILY PRN  





  [facility]  


 


Mirtazapine  15 mg  09/23/20 22:00 





  Remeron -  PO  





  [facility]  


 


Multivitamins/Minerals/Vitamin C  1 tab  09/23/20 10:00  09/23/20 09:03





  Tab-A-Vit -  PO   1 tab





  DAILY [facility]   Administration


 


Nitroglycerin  0.5 inch  09/23/20 06:00  09/23/20 11:28





  Nitro-Bid 2% Paste -  TD   0.5 inch





  Q6H [facility]   Administration


 


Oxycodone HCl  5 mg  09/23/20 09:26  09/23/20 12:12





  Roxicodone -  PO   5 mg





  Q6H PRN   Administration





  PAIN LEVEL 6-10  


 


Senna  2 tab  09/23/20 22:00 





  Senna -  PO  





  [facility]  


 


Tamsulosin HCl  0.4 mg  09/23/20 08:30  09/23/20 09:02





  Flomax -  PO   0.4 mg





  DAILY@0830 [facility]   Administration








                                   Vital Signs











 Period  Temp  Pulse  Resp  BP Sys/Villa  Pulse Ox


 


 Last 24 Hr  97.5 F-98.6 F  78-93  11-23  105-142/59-86  














nad no jvd


rrr s1s2 no mrg


cta bl nl eff


1+ le edema bl, no c/c


abd nt nd pos bs


no jaundice diaphoresis


+dp pt no carotid bruits





                                        


                                        


echo 8/2020:  lvef 35%, global hk, nl rv, lae, nl bio avr, mild mr, mild tr, 

rvsp 38





echo 9/2020 EF 35-40%, mod to severe global hypokinesis of LV, severe 

hypokinesis of apical anterior wall and apex, LA mod dilated, mild to mod MAC, 

mild to mod MR, mild TR, bio AVR, well seated prosthetic valve, mild AR





ecg: sr, rbbb, PVCs





cxr: clear





tele: sr, PVCs








a/p:  79 m hx htn, syst chf, as s/p remote bio avr, cad s/p remote pci, tia, 

presented with JO ANN, sepsis with chest pain, elevated trops





chest pain, elevated troponin, CAD, remote PCI


- patient unable to give further history regarding chest pain, resolved


- EKG unchanged from prior


- may be 2/2 demand in setting of underlying anemia, JO ANN, vs NSTEMI from plaque 

rupture


- also with wall motion abnormalities on echo here from 9/11/2020, known 

underlying CAD


- given poor prognosis and desire to minimize invasive procedures, would defer 

ischemic workup at this point and continue medical management for underlying CAD


- cont aspirin, statin





chronic syst chf:


- held coreg for low BPs, not on ACE/ARB due to JO ANN


- appears euvolemic, hold diuretics





hypotension, septic shock, UTI


- bacteremia 2/2 UTI, bcx no growth from 9/10


- improved with IVF, abx


- manage per pmd, ID





JO ANN


- improving with IVF





htn:


- bp stable off meds





avr:


-stable, nl fcn on echo

## 2020-09-23 NOTE — PN
Progress Note, Physician


History of Present Illness: 





Pt seen and examined at bedside. He is agitated. 





- Current Medication List


Current Medications: 


Active Medications





Acetaminophen (Tylenol -)  1,000 mg PO Q6H PRN


   PRN Reason: PAIN LEVEL 1-5


Albuterol Sulfate (Ventolin Hfa Inhaler -)  2 puff IH TID PRN


   PRN Reason: ASTHMA


Ascorbic Acid (Vitamin C -)  250 mg PO DAILY [facility]


   Last Admin: 09/23/20 09:02 Dose:  250 mg


   Documented by: 


Aspirin (Asa -)  81 mg PO DAILY [facility]


   Last Admin: 09/23/20 09:03 Dose:  81 mg


   Documented by: 


Atorvastatin Calcium (Lipitor -)  40 mg PO [facility]


Famotidine (Pepcid -)  20 mg PO DAILY [facility]


   Last Admin: 09/23/20 09:03 Dose:  20 mg


   Documented by: 


Ferrous Sulfate (Feosol -)  325 mg PO DAILY [facility]


   Last Admin: 09/23/20 09:03 Dose:  325 mg


   Documented by: 


Ampicillin Sodium 2 gm/ Sodium (Chloride)  100 mls @ 200 mls/hr IVPB Q4H-IV [facility];

Protocol


   Last Admin: 09/23/20 09:32 Dose:  200 mls/hr


   Documented by: 


Cefepime HCl 2 gm/ Dextrose  100 mls @ 200 mls/hr IVPB Q8H-IV [facility]; Protocol


   Last Admin: 09/23/20 09:03 Dose:  200 mls/hr


   Documented by: 


Levothyroxine Sodium (Synthroid -)  100 mcg PO ACBK [facility]


   Last Admin: 09/23/20 06:32 Dose:  Not Given


   Documented by: 


Magnesium Hydroxide (Milk Of Magnesia -)  30 ml PO DAILY PRN


   PRN Reason: CONSTIPATION


Mirtazapine (Remeron -)  15 mg PO [facility]


Multivitamins/Minerals/Vitamin C (Tab-A-Vit -)  1 tab PO DAILY [facility]


   Last Admin: 09/23/20 09:03 Dose:  1 tab


   Documented by: 


Nitroglycerin (Nitro-Bid 2% Paste -)  0.5 inch TD Q6H [facility]


   Last Admin: 09/23/20 11:28 Dose:  0.5 inch


   Documented by: 


Oxycodone HCl (Roxicodone -)  5 mg PO Q6H PRN


   PRN Reason: PAIN LEVEL 6-10


   Last Admin: 09/23/20 12:12 Dose:  5 mg


   Documented by: 


Senna (Senna -)  2 tab PO HS [facility]


Tamsulosin HCl (Flomax -)  0.4 mg PO DAILY@0830 [facility]


   Last Admin: 09/23/20 09:02 Dose:  0.4 mg


   Documented by: 











- Objective


Vital Signs: 


                                   Vital Signs











Temperature  97.5 F L  09/23/20 06:00


 


Pulse Rate  86   09/23/20 10:00


 


Respiratory Rate  20   09/23/20 10:00


 


Blood Pressure  111/59 L  09/23/20 10:00


 


O2 Sat by Pulse Oximetry (%)  97   09/23/20 10:00











Constitutional: Yes: Calm


Eyes: Yes: Conjunctiva Clear


HENT: Yes: Atraumatic


Neck: Yes: Supple


Cardiovascular: Yes: S1, S2


Respiratory: Yes: CTA Bilaterally


Gastrointestinal: Yes: Normal Bowel Sounds, Soft


Genitourinary: Yes: Incontinence


Musculoskeletal: Yes: WNL


Edema: No


Integumentary: Yes: WNL


Psychiatric: Yes: Agitated


Labs: 


                                    CBC, BMP





                                 09/23/20 10:30 





                                 09/23/20 10:30 





                                    INR, PTT











INR  0.95  (0.83-1.09)   09/07/20  22:50    














Problem List





- Problems


(1) JO ANN (acute kidney injury)


Code(s): N17.9 - ACUTE KIDNEY FAILURE, UNSPECIFIED   





(2) Hyperkalemia


Code(s): E87.5 - HYPERKALEMIA   





(3) Sepsis


Code(s): A41.9 - SEPSIS, UNSPECIFIED ORGANISM   





Assessment/Plan


                               Current Medications











Generic Name Dose Route Start Last Admin





  Trade Name Freq  PRN Reason Stop Dose Admin


 


Acetaminophen  1,000 mg  09/23/20 00:39 





  Tylenol -  PO  





  Q6H PRN  





  PAIN LEVEL 1-5  


 


Albuterol Sulfate  2 puff  09/23/20 00:39 





  Ventolin Hfa Inhaler -  IH  





  TID PRN  





  [facility]  


 


Ascorbic Acid  250 mg  09/23/20 10:00  09/23/20 09:02





  Vitamin C -  PO   250 mg





  DAILY [facility]   Administration


 


Aspirin  81 mg  09/23/20 10:00  09/23/20 09:03





  Asa -  PO   81 mg





  DAILY [facility]   Administration


 


Atorvastatin Calcium  40 mg  09/23/20 22:00 





  Lipitor -  PO  





  HS [facility]  


 


Famotidine  20 mg  09/23/20 10:00  09/23/20 09:03





  Pepcid -  PO   20 mg





  DAILY [facility]   Administration


 


Ferrous Sulfate  325 mg  09/23/20 10:00  09/23/20 09:03





  Feosol -  PO   325 mg





  DAILY [facility]   Administration


 


Ampicillin Sodium 2 gm/ Sodium  100 mls @ 200 mls/hr  09/23/20 02:00  09/23/20 0

9:32





  Chloride  IVPB   200 mls/hr





  Q4H-IV [facility]   Administration





  Protocol  


 


Cefepime HCl 2 gm/ Dextrose  100 mls @ 200 mls/hr  09/23/20 02:00  09/23/20 

09:03





  IVPB   200 mls/hr





  Q8H-IV [facility]   Administration





  Protocol  


 


Levothyroxine Sodium  100 mcg  09/23/20 07:00  09/23/20 06:32





  Synthroid -  PO   Not Given





  ACBK [facility]  


 


Magnesium Hydroxide  30 ml  09/23/20 00:39 





  Milk Of Magnesia -  PO  





  DAILY PRN  





  [facility]  


 


Mirtazapine  15 mg  09/23/20 22:00 





  Remeron -  PO  





  [facility]  


 


Multivitamins/Minerals/Vitamin C  1 tab  09/23/20 10:00  09/23/20 09:03





  Tab-A-Vit -  PO   1 tab





  DAILY [facility]   Administration


 


Nitroglycerin  0.5 inch  09/23/20 06:00  09/23/20 11:28





  Nitro-Bid 2% Paste -  TD   0.5 inch





  Q6H [facility]   Administration


 


Oxycodone HCl  5 mg  09/23/20 09:26  09/23/20 12:12





  Roxicodone -  PO   5 mg





  Q6H PRN   Administration





  PAIN LEVEL 6-10  


 


Senna  2 tab  09/23/20 22:00 





  Senna -  PO  





  [facility]  


 


Tamsulosin HCl  0.4 mg  09/23/20 08:30  09/23/20 09:02





  Flomax -  PO   0.4 mg





  DAILY@0830 [facility]   Administration











Impression


1. JO ANN


2. sepsis


3. hyperkalemia


4. hypotension


5. chf


6. bph


7. cad


8. tia


9. gerd


10. hypernatremia





Plan


- potassium improved


- cont fluids


- monitor volume status


- labs reviewed


- encourage po intake

## 2020-09-24 RX ADMIN — FAMOTIDINE SCH: 20 TABLET ORAL at 10:44

## 2020-09-24 RX ADMIN — AMPICILLIN SODIUM SCH: 2 INJECTION, POWDER, FOR SOLUTION INTRAMUSCULAR; INTRAVENOUS at 01:02

## 2020-09-24 RX ADMIN — CEFEPIME HYDROCHLORIDE SCH: 2 INJECTION, POWDER, FOR SOLUTION INTRAVENOUS at 01:02

## 2020-09-24 RX ADMIN — NITROGLYCERIN SCH: 20 OINTMENT TOPICAL at 06:31

## 2020-09-24 RX ADMIN — NITROGLYCERIN SCH INCH: 20 OINTMENT TOPICAL at 06:25

## 2020-09-24 RX ADMIN — CEFEPIME HYDROCHLORIDE SCH: 2 INJECTION, POWDER, FOR SOLUTION INTRAVENOUS at 10:42

## 2020-09-24 RX ADMIN — LEVOTHYROXINE SODIUM SCH: 100 TABLET ORAL at 06:33

## 2020-09-24 RX ADMIN — LEVOTHYROXINE SODIUM SCH MCG: 100 TABLET ORAL at 06:25

## 2020-09-24 RX ADMIN — FERROUS SULFATE TAB EC 324 MG (65 MG FE EQUIVALENT) SCH: 324 (65 FE) TABLET DELAYED RESPONSE at 10:44

## 2020-09-24 RX ADMIN — SENNOSIDES SCH: 8.6 TABLET, FILM COATED ORAL at 21:32

## 2020-09-24 RX ADMIN — MULTIVITAMIN TABLET SCH TAB: TABLET at 10:34

## 2020-09-24 RX ADMIN — NITROGLYCERIN SCH: 20 OINTMENT TOPICAL at 23:32

## 2020-09-24 RX ADMIN — NITROGLYCERIN SCH INCH: 20 OINTMENT TOPICAL at 12:39

## 2020-09-24 RX ADMIN — NITROGLYCERIN SCH: 20 OINTMENT TOPICAL at 00:44

## 2020-09-24 RX ADMIN — CEFEPIME HYDROCHLORIDE SCH: 2 INJECTION, POWDER, FOR SOLUTION INTRAVENOUS at 17:42

## 2020-09-24 RX ADMIN — AMPICILLIN SODIUM SCH: 2 INJECTION, POWDER, FOR SOLUTION INTRAMUSCULAR; INTRAVENOUS at 14:25

## 2020-09-24 RX ADMIN — MIRTAZAPINE SCH: 15 TABLET, FILM COATED ORAL at 21:31

## 2020-09-24 RX ADMIN — ASPIRIN 81 MG SCH: 81 TABLET ORAL at 10:44

## 2020-09-24 RX ADMIN — AMPICILLIN SODIUM SCH: 2 INJECTION, POWDER, FOR SOLUTION INTRAMUSCULAR; INTRAVENOUS at 05:41

## 2020-09-24 RX ADMIN — NITROGLYCERIN SCH INCH: 20 OINTMENT TOPICAL at 18:27

## 2020-09-24 RX ADMIN — AMPICILLIN SODIUM SCH: 2 INJECTION, POWDER, FOR SOLUTION INTRAMUSCULAR; INTRAVENOUS at 17:41

## 2020-09-24 RX ADMIN — NITROGLYCERIN SCH INCH: 20 OINTMENT TOPICAL at 01:00

## 2020-09-24 RX ADMIN — ATORVASTATIN CALCIUM SCH: 40 TABLET, FILM COATED ORAL at 21:31

## 2020-09-24 RX ADMIN — ASPIRIN 81 MG SCH MG: 81 TABLET ORAL at 10:38

## 2020-09-24 RX ADMIN — TAMSULOSIN HYDROCHLORIDE SCH MG: 0.4 CAPSULE ORAL at 08:38

## 2020-09-24 RX ADMIN — FAMOTIDINE SCH MG: 20 TABLET ORAL at 10:34

## 2020-09-24 RX ADMIN — AMPICILLIN SODIUM SCH: 2 INJECTION, POWDER, FOR SOLUTION INTRAMUSCULAR; INTRAVENOUS at 21:31

## 2020-09-24 RX ADMIN — Medication SCH: at 10:44

## 2020-09-24 RX ADMIN — AMPICILLIN SODIUM SCH: 2 INJECTION, POWDER, FOR SOLUTION INTRAMUSCULAR; INTRAVENOUS at 10:42

## 2020-09-24 RX ADMIN — FERROUS SULFATE TAB EC 324 MG (65 MG FE EQUIVALENT) SCH MG: 324 (65 FE) TABLET DELAYED RESPONSE at 10:34

## 2020-09-24 RX ADMIN — Medication SCH MG: at 10:38

## 2020-09-24 NOTE — PN
Progress Note, Physician





- Current Medication List


Current Medications: 


Active Medications





Acetaminophen (Tylenol -)  1,000 mg PO Q6H PRN


   PRN Reason: PAIN LEVEL 1-5


Albuterol Sulfate (Ventolin Hfa Inhaler -)  2 puff IH TID PRN


   PRN Reason: ASTHMA


   Last Admin: 09/24/20 02:05 Dose:  2 puff


   Documented by: 


Ascorbic Acid (Vitamin C -)  250 mg PO DAILY [facility]


   Last Admin: 09/23/20 09:02 Dose:  250 mg


   Documented by: 


Aspirin (Asa -)  81 mg PO DAILY [facility]


   Last Admin: 09/23/20 09:03 Dose:  81 mg


   Documented by: 


Atorvastatin Calcium (Lipitor -)  40 mg PO [facility]


   Last Admin: 09/23/20 21:18 Dose:  40 mg


   Documented by: 


Famotidine (Pepcid -)  20 mg PO DAILY [facility]


   Last Admin: 09/23/20 09:03 Dose:  20 mg


   Documented by: 


Ferrous Sulfate (Feosol -)  325 mg PO DAILY [facility]


   Last Admin: 09/23/20 09:03 Dose:  325 mg


   Documented by: 


Ampicillin Sodium 2 gm/ Sodium (Chloride)  100 mls @ 200 mls/hr IVPB Q4H-IV [facility];

Protocol


   Last Admin: 09/24/20 05:41 Dose:  Not Given


   Documented by: 


Cefepime HCl 2 gm/ Dextrose  100 mls @ 200 mls/hr IVPB Q8H-IV [facility]; Protocol


   Last Admin: 09/24/20 01:02 Dose:  Not Given


   Documented by: 


Levothyroxine Sodium (Synthroid -)  100 mcg PO ACBK [facility]


   Last Admin: 09/24/20 06:33 Dose:  Not Given


   Documented by: 


Magnesium Hydroxide (Milk Of Magnesia -)  30 ml PO DAILY PRN


   PRN Reason: CONSTIPATION


Mirtazapine (Remeron -)  15 mg PO [facility]


   Last Admin: 09/23/20 21:18 Dose:  15 mg


   Documented by: 


Multivitamins/Minerals/Vitamin C (Tab-A-Vit -)  1 tab PO DAILY [facility]


   Last Admin: 09/23/20 09:03 Dose:  1 tab


   Documented by: 


Nitroglycerin (Nitro-Bid 2% Paste -)  0.5 inch TD Q6H [facility]


   Last Admin: 09/24/20 06:31 Dose:  Not Given


   Documented by: 


Oxycodone HCl (Roxicodone -)  5 mg PO Q6H PRN


   PRN Reason: PAIN LEVEL 6-10


   Last Admin: 09/23/20 12:12 Dose:  5 mg


   Documented by: 


Senna (Senna -)  2 tab PO HS [facility]


   Last Admin: 09/23/20 21:18 Dose:  2 tab


   Documented by: 


Tamsulosin HCl (Flomax -)  0.4 mg PO DAILY@0830 [facility]


   Last Admin: 09/23/20 09:02 Dose:  0.4 mg


   Documented by: 











- Objective


Vital Signs: 


                                   Vital Signs











Temperature  97.9 F   09/24/20 05:29


 


Pulse Rate  95 H  09/24/20 05:29


 


Respiratory Rate  18   09/24/20 05:29


 


Blood Pressure  150/90   09/24/20 05:29


 


O2 Sat by Pulse Oximetry (%)  99   09/24/20 05:29











Cardiovascular: Yes: Regular Rate and Rhythm


Respiratory: Yes: Regular, CTA Bilaterally


Gastrointestinal: Yes: Normal Bowel Sounds, Soft


Labs: 


                                    CBC, BMP





                                 09/23/20 10:30 





                                 09/23/20 10:30 





                                    INR, PTT











INR  0.95  (0.83-1.09)   09/07/20  22:50    














Problem List





- Problems


(1) Dysphagia


Code(s): R13.10 - DYSPHAGIA, UNSPECIFIED   





(2) Weakness


Code(s): R53.1 - WEAKNESS   





(3) JO ANN (acute kidney injury)


Code(s): N17.9 - ACUTE KIDNEY FAILURE, UNSPECIFIED   





(4) Chronic retention of urine


Code(s): R33.9 - RETENTION OF URINE, UNSPECIFIED   





(5) Sepsis


Code(s): A41.9 - SEPSIS, UNSPECIFIED ORGANISM   





Assessment/Plan





- Problems


(1) Chronic retention of urine


Assessment/Plan: 


-Continue swenson cath


Problems reviewed: Yes   


Code(s): R33.9 - RETENTION OF URINE, UNSPECIFIED   





(2) JO ANN (acute kidney injury)


Assessment/Plan: 


-Nephrology consult


-Renal U/S


-UC pseudomonas


-Cr at baseline


-REFUSING IVF--DC--LABS--refuses


Problems reviewed: Yes   


Code(s): N17.9 - ACUTE KIDNEY FAILURE, UNSPECIFIED   





(3) Hyperkalemia


Assessment/Plan: 


-K hemolyzed yesterday


-repeat CMP 


-Nephrology on board


Problems reviewed: Yes   


Code(s): E87.5 - HYPERKALEMIA   





(4) Sepsis


Assessment/Plan: 


-ID consult


-IV Ampicillin + Cefepime--IF PT AGREES


-No Lactic acidosis


-Afebrile


-Cultures:


                                  Microbiology





09/10/20 09:37   Blood - Peripheral Venous   Blood Culture - Final


                            NO GROWTH AFTER 5 DAYS INCUBATION


09/09/20 17:15   Blood - Peripheral Venous   Blood Culture - Final


                            NO GROWTH AFTER 5 DAYS INCUBATION


09/09/20 17:30   Blood - Peripheral Venous   Blood Culture - Final


                            NO GROWTH AFTER 5 DAYS INCUBATION


09/09/20 22:25   Urine - Urine Swenosn   Urine Culture - Final


                            Pseudomonas Aeruginosa


09/07/20 22:50   Blood - Peripheral Venous   Blood Culture - Final


                            Group D Strep Or Entero Coccus


                            Pseudomonas Species


09/07/20 22:50   Blood - Peripheral Venous   Blood Culture - Final


                            Enterococcus Faecalis


                            Pseudomonas Aeruginosa


09/08/20 00:27   Urine - Urine - Catheterized   Urine Culture - Final


                            Contaminated: Please Repeat











PICC LINE


Problems reviewed: Yes   


Code(s): A41.9 - SEPSIS, UNSPECIFIED ORGANISM   





(5) Failure to thrive


Assessment/Plan: 


-Add multivitamin


-Ensure pudding + Magic cup


-Seen by Speech pathology


-MBS- Mod to severe stasis with puree. Aspiration on thin liquid


-Dysphagia puree thinned out with nectar thick liquids





Problems reviewed: Yes   


Code(s): [id number] -    





(6) Malnutrition


Assessment/Plan: 


-Multivitamin


-Mirtazapine


Problems reviewed: Yes   


Code(s): E46 - UNSPECIFIED PROTEIN-CALORIE MALNUTRITION   


Qualifiers: 


   Protein-calorie malnutrition severity: severe 





(7) Bacteremia


Assessment/Plan: 


-Cultures:


                                  Microbiology





09/10/20 09:37   Blood - Peripheral Venous   Blood Culture - Final


                            NO GROWTH AFTER 5 DAYS INCUBATION


09/09/20 17:15   Blood - Peripheral Venous   Blood Culture - Final


                            NO GROWTH AFTER 5 DAYS INCUBATION


09/09/20 17:30   Blood - Peripheral Venous   Blood Culture - Final


                            NO GROWTH AFTER 5 DAYS INCUBATION


09/09/20 22:25   Urine - Urine Swenson   Urine Culture - Final


                            Pseudomonas Aeruginosa


09/07/20 22:50   Blood - Peripheral Venous   Blood Culture - Final


                            Group D Strep Or Entero Coccus


                            Pseudomonas Species


09/07/20 22:50   Blood - Peripheral Venous   Blood Culture - Final


                            Enterococcus Faecalis


                            Pseudomonas Aeruginosa


09/08/20 00:27   Urine - Urine - Catheterized   Urine Culture - Final


                            Contaminated: Please Repeat





-rest as above


Problems reviewed: Yes   


Code(s): R78.81 - BACTEREMIA   





(8) Chest pain


Assessment/Plan: 


-Cardiology consult appreciated


-Conservative measures


-Tele monitor


-Nitro patch


-Echo reviewed


Problems reviewed: Yes   


Code(s): R07.9 - CHEST PAIN, UNSPECIFIED   





(9) Elevated troponin


Assessment/Plan: 


as above


Problems reviewed: Yes   


Code(s): R79.89 - OTHER SPECIFIED ABNORMAL FINDINGS OF BLOOD CHEMISTRY   





(10) NSTEMI (non-ST elevated myocardial infarction)


Assessment/Plan: 


as above


Problems reviewed: Yes   


Code(s): I21.4 - NON-ST ELEVATION (NSTEMI) MYOCARDIAL INFARCTION   





(11) Abdominal pain


Assessment/Plan: 


-2/2 to constipation


-Milk of magnesia


-CTAP reviewed-unremarkable except constipation


Problems reviewed: Yes   


Code(s): R10.9 - UNSPECIFIED ABDOMINAL PAIN   





(12) Constipation


Assessment/Plan: 


-Continue Senna 2 tabs HS


-MOM PRN


Problems reviewed: Yes   


Code(s): K59.00 - CONSTIPATION, UNSPECIFIED   





PALLIATIVE CARE CONSULT Appreciated


DC PLANNING


PICC LINE Refuses

## 2020-09-24 NOTE — PN
Progress Note, SLP





- Note


Progress Note: 


                                Selected Entries











  09/19/20 09/19/20 09/19/20





  09:00 09:19 13:16


 


Breakfast 25% NPO NPO


 


Diet Tolerated   


 


Lunch  NPO NPO


 


Supper   


 


Temperature   


 


Pulse Rate   


 


Blood Pressure   


 


O2 Sat by Pulse   





Oximetry (%)   


 


Oxygen Delivery   





Method   














  09/19/20 09/20/20 09/20/20





  20:00 10:00 14:22


 


Breakfast NPO 50% 


 


Diet Tolerated Poor Poor 


 


Lunch NPO  75%


 


Supper 25%  


 


Temperature   


 


Pulse Rate   


 


Blood Pressure   


 


O2 Sat by Pulse   





Oximetry (%)   


 


Oxygen Delivery   





Method   














  09/21/20 09/21/20 09/22/20





  15:00 21:47 00:00


 


Breakfast 25%  


 


Diet Tolerated Poor Refused 


 


Lunch 75%  


 


Supper   


 


Temperature   


 


Pulse Rate   88


 


Blood Pressure   140/73


 


O2 Sat by Pulse   





Oximetry (%)   


 


Oxygen Delivery   





Method   














  09/22/20 09/22/20 09/22/20





  04:00 09:00 09:41


 


Breakfast   75%


 


Diet Tolerated   


 


Lunch   


 


Supper   


 


Temperature 98.2 F  


 


Pulse Rate 86  


 


Blood Pressure 159/87  


 


O2 Sat by Pulse  100 





Oximetry (%)   


 


Oxygen Delivery  Room Air 





Method   














  09/22/20





  10:40


 


Breakfast 


 


Diet Tolerated 


 


Lunch 


 


Supper 


 


Temperature 


 


Pulse Rate 95 H


 


Blood Pressure 127/77


 


O2 Sat by Pulse 100





Oximetry (%) 


 


Oxygen Delivery 





Method 








                                Laboratory Tests











  09/18/20 09/19/20 09/20/20





  07:35 10:45 06:00


 


WBC  13.1 H  10.7 H  11.7 H














  09/21/20





  05:15


 


WBC  9.3





                                Selected Entries











  09/22/20 09/22/20 09/23/20





  09:41 15:00 02:00


 


Breakfast 75% 75% 


 


Diet Tolerated   


 


Lunch 50% 50% 


 


Temperature   97.7 F


 


Pulse Rate   78


 


Blood Pressure   105/64














  09/23/20 09/23/20





  06:00 09:33


 


Breakfast  25%


 


Diet Tolerated  Poor


 


Lunch  


 


Temperature 97.5 F L 


 


Pulse Rate 83 


 


Blood Pressure 142/79 








                                Laboratory Tests











  09/21/20





  05:15


 


WBC  9.3








                                Selected Entries











  09/23/20 09/23/20 09/24/20





  09:33 21:26 05:29


 


Breakfast 25%  


 


Diet Tolerated Poor Well 


 


Lunch 75%  


 


Supper  50% 


 


Temperature   97.9 F


 


Pulse Rate   95 H


 


Blood Pressure   150/90








                                Laboratory Tests











  09/23/20





  10:30


 


WBC  7.1














MBS 9/18- SILENT ASPIRATION ON THIN LIQUID


Downgraded to [place] thick due to silent aspiration, and upgrade trial of 

dysphagia chopped.


Supplements b/n meals.


Eating more

## 2020-09-24 NOTE — PN
Progress Note (short form)





- Note


Progress Note: 


s: no cp sob palps dizzy





no cigs


                              Current Medications











Generic Name Dose Route Start Last Admin





  Trade Name Freq  PRN Reason Stop Dose Admin


 


Acetaminophen  1,000 mg  09/23/20 00:39 





  Tylenol -  PO  





  Q6H PRN  





  PAIN LEVEL 1-5  


 


Albuterol Sulfate  2 puff  09/23/20 00:39  09/24/20 02:05





  Ventolin Hfa Inhaler -  IH   2 puff





  TID PRN   Administration





  ASTHMA  


 


Ascorbic Acid  250 mg  09/23/20 10:00  09/24/20 10:44





  Vitamin C -  PO   Not Given





  DAILY [facility]  


 


Aspirin  81 mg  09/23/20 10:00  09/24/20 10:44





  Asa -  PO   Not Given





  DAILY [facility]  


 


Atorvastatin Calcium  40 mg  09/23/20 22:00  09/23/20 21:18





  Lipitor -  PO   40 mg





  HS [facility]LL[facility]   Administration


 


Famotidine  20 mg  09/23/20 10:00  09/24/20 10:44





  Pepcid -  PO   Not Given





  DAILY [facility]  


 


Ferrous Sulfate  325 mg  09/23/20 10:00  09/24/20 10:44





  Feosol -  PO   Not Given





  DAILY [facility]  


 


Ampicillin Sodium 2 gm/ Sodium  100 mls @ 200 mls/hr  09/23/20 02:00  09/24/20 

10:42





  Chloride  IVPB   Not Given





  Q4H-IV [facility]  





  Protocol  


 


Cefepime HCl 2 gm/ Dextrose  100 mls @ 200 mls/hr  09/23/20 02:00  09/24/20 

10:42





  IVPB   Not Given





  Q8H-IV [facility]  





  Protocol  


 


Levothyroxine Sodium  100 mcg  09/23/20 07:00  09/24/20 06:33





  Synthroid -  PO   Not Given





  ACBK [facility]  


 


Magnesium Hydroxide  30 ml  09/23/20 00:39 





  Milk Of Magnesia -  PO  





  DAILY PRN  





  [facility]  


 


Mirtazapine  15 mg  09/23/20 22:00  09/23/20 21:18





  Remeron -  PO   15 mg





  HS [facility]   Administration


 


Multivitamins/Minerals/Vitamin C  1 tab  09/23/20 10:00  09/24/20 10:34





  Tab-A-Vit -  PO   1 tab





  DAILY [facility]   Administration


 


Nitroglycerin  0.5 inch  09/23/20 06:00  09/24/20 06:31





  Nitro-Bid 2% Paste -  TD   Not Given





  Q6H [facility]  


 


Oxycodone HCl  5 mg  09/23/20 09:26  09/23/20 12:12





  Roxicodone -  PO   5 mg





  Q6H PRN   Administration





  PAIN LEVEL 6-10  


 


Senna  2 tab  09/23/20 22:00  09/23/20 21:18





  Senna -  PO   2 tab





  [facility]   Administration


 


Tamsulosin HCl  0.4 mg  09/23/20 08:30  09/24/20 08:38





  Flomax -  PO   0.4 mg





  DAILY@0830 ELLEN   Administration











                                   Vital Signs











 Period  Temp  Pulse  Resp  BP Sys/Villa  Pulse Ox


 


 Last 24 Hr  97.5 F-97.9 F  78-95  18-18  /57-90  











nad no jvd


rrr s1s2 no mrg


cta bl nl eff


no le edema


abd nt nd pos bs


no jaundice diaphoresis


+dp pt no carotid bruits





                                    CBC, BMP





                                 09/23/20 10:30 





                                 09/23/20 10:30 











                                        


echo 8/2020:  lvef 35%, global hk, nl rv, lae, nl bio avr, mild mr, mild tr, 

rvsp 38





echo 9/2020 EF 35-40%, mod to severe global hypokinesis of LV, severe 

hypokinesis of apical anterior wall and apex, LA mod dilated, mild to mod MAC, 

mild to mod MR, mild TR, bio AVR, well seated prosthetic valve, mild AR





ecg: sr, rbbb, PVCs





cxr: clear








a/p:  79 m hx htn, syst chf, as s/p remote bio avr, cad s/p remote pci, tia, 

presented with JO ANN, sepsis with chest pain, elevated trops





chest pain, elevated troponin, CAD, remote PCI


- patient unable to give further history regarding chest pain, resolved


- EKG unchanged from prior


- may be 2/2 demand in setting of underlying anemia, JO ANN, vs NSTEMI from plaque 

rupture


- also with wall motion abnormalities on echo here from 9/11/2020, known 

underlying CAD


- given poor prognosis and desire to minimize invasive procedures, would defer 

ischemic workup at this point and continue medical management for underlying CAD


- cont aspirin, statin





chronic syst chf:


- held coreg for low BPs, not on ACE/ARB due to JO ANN


- appears euvolemic, hold diuretics





hypotension, septic shock, UTI


- bacteremia 2/2 UTI, bcx no growth from 9/10


- improved with IVF, abx


- manage per pmd, ID





JO ANN


- improving with IVF





htn:


- bp stable off meds





avr:


-stable, nl fcn on echo

## 2020-09-24 NOTE — PN
Progress Note, Physician


History of Present Illness: 





Pt seen and examined at bedside. He is awake and alert. He is less agitated than

yesterday. 





- Current Medication List


Current Medications: 


Active Medications





Acetaminophen (Tylenol -)  1,000 mg PO Q6H PRN


   PRN Reason: PAIN LEVEL 1-5


Albuterol Sulfate (Ventolin Hfa Inhaler -)  2 puff IH TID PRN


   PRN Reason: ASTHMA


   Last Admin: 09/24/20 02:05 Dose:  2 puff


   Documented by: 


Ascorbic Acid (Vitamin C -)  250 mg PO DAILY [facility]


   Last Admin: 09/24/20 10:44 Dose:  Not Given


   Documented by: 


Aspirin (Asa -)  81 mg PO DAILY [facility]


   Last Admin: 09/24/20 10:44 Dose:  Not Given


   Documented by: 


Atorvastatin Calcium (Lipitor -)  40 mg PO [facility]


   Last Admin: 09/23/20 21:18 Dose:  40 mg


   Documented by: 


Famotidine (Pepcid -)  20 mg PO DAILY [facility]


   Last Admin: 09/24/20 10:44 Dose:  Not Given


   Documented by: 


Ferrous Sulfate (Feosol -)  325 mg PO DAILY [facility]


   Last Admin: 09/24/20 10:44 Dose:  Not Given


   Documented by: 


Ampicillin Sodium 2 gm/ Sodium (Chloride)  100 mls @ 200 mls/hr IVPB Q4H-IV [facility];

Protocol


   Last Admin: 09/24/20 10:42 Dose:  Not Given


   Documented by: 


Cefepime HCl 2 gm/ Dextrose  100 mls @ 200 mls/hr IVPB Q8H-IV [facility]; Protocol


   Last Admin: 09/24/20 10:42 Dose:  Not Given


   Documented by: 


Levothyroxine Sodium (Synthroid -)  100 mcg PO ACBK [facility]


   Last Admin: 09/24/20 06:33 Dose:  Not Given


   Documented by: 


Magnesium Hydroxide (Milk Of Magnesia -)  30 ml PO DAILY PRN


   PRN Reason: CONSTIPATION


Mirtazapine (Remeron -)  15 mg PO HS [facility]


   Last Admin: 09/23/20 21:18 Dose:  15 mg


   Documented by: 


Multivitamins/Minerals/Vitamin C (Tab-A-Vit -)  1 tab PO DAILY [facility]


   Last Admin: 09/24/20 10:34 Dose:  1 tab


   Documented by: 


Nitroglycerin (Nitro-Bid 2% Paste -)  0.5 inch TD Q6H [facility]


   Last Admin: 09/24/20 12:39 Dose:  0.5 inch


   Documented by: 


Oxycodone HCl (Roxicodone -)  5 mg PO Q6H PRN


   PRN Reason: PAIN LEVEL 6-10


   Last Admin: 09/24/20 12:37 Dose:  5 mg


   Documented by: 


[place] (Senna -)  2 tab PO HS [facility]


   Last Admin: 09/23/20 21:18 Dose:  2 tab


   Documented by: 


Tamsulosin HCl (Flomax -)  0.4 mg PO DAILY@0830 [facility]


   Last Admin: 09/24/20 08:38 Dose:  0.4 mg


   Documented by: 











- Objective


Vital Signs: 


                                   Vital Signs











Temperature  97.9 F   09/24/20 05:29


 


Pulse Rate  88   09/24/20 14:37


 


Respiratory Rate  18   09/24/20 14:37


 


Blood Pressure  136/71   09/24/20 14:37


 


O2 Sat by Pulse Oximetry (%)  100   09/24/20 14:37











Constitutional: Yes: Calm


Eyes: Yes: Conjunctiva Clear


HENT: Yes: Atraumatic


Neck: Yes: Supple


Cardiovascular: Yes: S1, S2


Respiratory: Yes: CTA Bilaterally


Gastrointestinal: Yes: Soft


Genitourinary: Yes: WNL


Musculoskeletal: Yes: WNL


Edema: No


Neurological: Yes: Oriented


Psychiatric: Yes: Oriented


Labs: 


                                    CBC, BMP





                                 09/23/20 10:30 





                                 09/23/20 10:30 





                                    INR, PTT











INR  0.95  (0.83-1.09)   09/07/20  22:50    














Problem List





- Problems


(1) JO ANN (acute kidney injury)


Code(s): N17.9 - ACUTE KIDNEY FAILURE, UNSPECIFIED   





(2) Hyperkalemia


Code(s): E87.5 - HYPERKALEMIA   





(3) Sepsis


Code(s): A41.9 - SEPSIS, UNSPECIFIED ORGANISM   





Assessment/Plan


                               Current Medications











Generic Name Dose Route Start Last Admin





  Trade Name Freq  PRN Reason Stop Dose Admin


 


Acetaminophen  1,000 mg  09/23/20 00:39 





  Tylenol -  PO  





  Q6H PRN  





  PAIN LEVEL 1-5  


 


Albuterol Sulfate  2 puff  09/23/20 00:39  09/24/20 02:05





  Ventolin Hfa Inhaler -  IH   2 puff





  TID PRN   Administration





  ASTHMA  


 


Ascorbic Acid  250 mg  09/23/20 10:00  09/24/20 10:44





  Vitamin C -  PO   Not Given





  DAILY [facility]  


 


Aspirin  81 mg  09/23/20 10:00  09/24/20 10:44





  Asa -  PO   Not Given





  DAILY [facility]  


 


Atorvastatin Calcium  40 mg  09/23/20 22:00  09/23/20 21:18





  Lipitor -  PO   40 mg





  HS [facility]   Administration


 


Famotidine  20 mg  09/23/20 10:00  09/24/20 10:44





  Pepcid -  PO   Not Given





  DAILY [facility]  


 


Ferrous Sulfate  325 mg  09/23/20 10:00  09/24/20 10:44





  Feosol -  PO   Not Given





  DAILY [facility]  


 


Ampicillin Sodium 2 gm/ Sodium  100 mls @ 200 mls/hr  09/23/20 02:00  09/24/20 

10:42





  Chloride  IVPB   Not Given





  Q4H-IV [facility]  





  Protocol  


 


Cefepime HCl 2 gm/ Dextrose  100 mls @ 200 mls/hr  09/23/20 02:00  09/24/20 

10:42





  IVPB   Not Given





  Q8H-IV [facility]  





  Protocol  


 


Levothyroxine Sodium  100 mcg  09/23/20 07:00  09/24/20 06:33





  Synthroid -  PO   Not Given





  ACBK [facility]  


 


Magnesium Hydroxide  30 ml  09/23/20 00:39 





  Milk Of Magnesia -  PO  





  DAILY PRN  





  [facility]  


 


Mirtazapine  15 mg  09/23/20 22:00  09/23/20 21:18





  Remeron -  PO   15 mg





  HS [facility]   Administration


 


Multivitamins/Minerals/Vitamin C  1 tab  09/23/20 10:00  09/24/20 10:34





  Tab-A-Vit -  PO   1 tab





  DAILY [facility]   Administration


 


Nitroglycerin  0.5 inch  09/23/20 06:00  09/24/20 12:39





  Nitro-Bid 2% Paste -  TD   0.5 inch





  Q6H [facility]   Administration


 


Oxycodone HCl  5 mg  09/23/20 09:26  09/24/20 12:37





  Roxicodone -  PO   5 mg





  Q6H PRN   Administration





  PAIN LEVEL 6-10  


 


Senna  2 tab  09/23/20 22:00  09/23/20 21:18





  Senna -  PO   2 tab





  HS [facility]   Administration


 


Tamsulosin HCl  0.4 mg  09/23/20 08:30  09/24/20 08:38





  Flomax -  PO   0.4 mg





  DAILY@0830 [facility]   Administration











Impression


1. JO ANN


2. sepsis


3. hyperkalemia


4. hypotension


5. chf


6. bph


7. cad


8. tia


9. gerd


10. hypernatremia








Plan


- labs from yesterday are stable


- he refused labs today


- pt refused IV fluids


- encourage po intake

## 2020-09-25 VITALS — TEMPERATURE: 97.2 F | DIASTOLIC BLOOD PRESSURE: 64 MMHG | SYSTOLIC BLOOD PRESSURE: 103 MMHG

## 2020-09-25 VITALS — HEART RATE: 88 BPM

## 2020-09-25 RX ADMIN — NITROGLYCERIN SCH: 20 OINTMENT TOPICAL at 12:11

## 2020-09-25 RX ADMIN — NITROGLYCERIN SCH INCH: 20 OINTMENT TOPICAL at 05:29

## 2020-09-25 RX ADMIN — AMPICILLIN SODIUM SCH: 2 INJECTION, POWDER, FOR SOLUTION INTRAMUSCULAR; INTRAVENOUS at 10:25

## 2020-09-25 RX ADMIN — CEFEPIME HYDROCHLORIDE SCH: 2 INJECTION, POWDER, FOR SOLUTION INTRAVENOUS at 10:05

## 2020-09-25 RX ADMIN — TAMSULOSIN HYDROCHLORIDE SCH MG: 0.4 CAPSULE ORAL at 10:04

## 2020-09-25 RX ADMIN — AMPICILLIN SODIUM SCH: 2 INJECTION, POWDER, FOR SOLUTION INTRAMUSCULAR; INTRAVENOUS at 01:13

## 2020-09-25 RX ADMIN — ASPIRIN 81 MG SCH MG: 81 TABLET ORAL at 10:04

## 2020-09-25 RX ADMIN — FAMOTIDINE SCH MG: 20 TABLET ORAL at 10:04

## 2020-09-25 RX ADMIN — CEFEPIME HYDROCHLORIDE SCH: 2 INJECTION, POWDER, FOR SOLUTION INTRAVENOUS at 01:13

## 2020-09-25 RX ADMIN — AMPICILLIN SODIUM SCH: 2 INJECTION, POWDER, FOR SOLUTION INTRAMUSCULAR; INTRAVENOUS at 15:10

## 2020-09-25 RX ADMIN — Medication SCH MG: at 10:05

## 2020-09-25 RX ADMIN — LEVOTHYROXINE SODIUM SCH: 100 TABLET ORAL at 06:02

## 2020-09-25 RX ADMIN — AMPICILLIN SODIUM SCH: 2 INJECTION, POWDER, FOR SOLUTION INTRAMUSCULAR; INTRAVENOUS at 05:30

## 2020-09-25 RX ADMIN — MULTIVITAMIN TABLET SCH TAB: TABLET at 10:04

## 2020-09-25 RX ADMIN — FERROUS SULFATE TAB EC 324 MG (65 MG FE EQUIVALENT) SCH MG: 324 (65 FE) TABLET DELAYED RESPONSE at 10:04

## 2020-09-25 NOTE — PN
Progress Note, Physician


Chief Complaint: 





no acute distress








- Current Medication List


Current Medications: 


Active Medications





Acetaminophen (Tylenol -)  1,000 mg PO Q6H PRN


   PRN Reason: PAIN LEVEL 1-5


Albuterol Sulfate (Ventolin Hfa Inhaler -)  2 puff IH TID PRN


   PRN Reason: ASTHMA


   Last Admin: 09/24/20 02:05 Dose:  2 puff


   Documented by: 


Ascorbic Acid (Vitamin C -)  250 mg PO DAILY [facility]


   Last Admin: 09/24/20 10:44 Dose:  Not Given


   Documented by: 


Aspirin (Asa -)  81 mg PO DAILY [facility]


   Last Admin: 09/24/20 10:44 Dose:  Not Given


   Documented by: 


Atorvastatin Calcium (Lipitor -)  40 mg PO HS [facility]


   Last Admin: 09/24/20 21:31 Dose:  Not Given


   Documented by: 


Famotidine (Pepcid -)  20 mg PO DAILY [facility]


   Last Admin: 09/24/20 10:44 Dose:  Not Given


   Documented by: 


Ferrous Sulfate (Feosol -)  325 mg PO DAILY [facility]


   Last Admin: 09/24/20 10:44 Dose:  Not Given


   Documented by: 


Ampicillin Sodium 2 gm/ Sodium (Chloride)  100 mls @ 200 mls/hr IVPB Q4H-IV [facility];

Protocol


   Last Admin: 09/25/20 05:30 Dose:  Not Given


   Documented by: 


Cefepime HCl 2 gm/ Dextrose  100 mls @ 200 mls/hr IVPB Q8H-IV [facility]; Protocol


   Last Admin: 09/25/20 01:13 Dose:  Not Given


   Documented by: 


Levothyroxine Sodium (Synthroid -)  100 mcg PO ACBK [facility]


   Last Admin: 09/25/20 06:02 Dose:  Not Given


   Documented by: 


Magnesium Hydroxide (Milk Of Magnesia -)  30 ml PO DAILY PRN


   PRN Reason: CONSTIPATION


Mirtazapine (Remeron -)  15 mg PO [facility]


   Last Admin: 09/24/20 21:31 Dose:  Not Given


   Documented by: 


Multivitamins/Minerals/Vitamin C (Tab-A-Vit -)  1 tab PO DAILY [facility]


   Last Admin: 09/24/20 10:34 Dose:  1 tab


   Documented by: 


Nitroglycerin (Nitro-Bid 2% Paste -)  0.5 inch TD Q6H [facility]


   Last Admin: 09/25/20 05:29 Dose:  0.5 inch


   Documented by: 


Oxycodone HCl (Roxicodone -)  5 mg PO Q6H PRN


   PRN Reason: PAIN LEVEL 6-10


   Last Admin: 09/24/20 18:26 Dose:  5 mg


   Documented by: 


Senna (Senna -)  2 tab PO HS [facility]


   Last Admin: 09/24/20 21:32 Dose:  Not Given


   Documented by: 


Tamsulosin HCl (Flomax -)  0.4 mg PO DAILY@0830 [facility]


   Last Admin: 09/24/20 08:38 Dose:  0.4 mg


   Documented by: 











- Objective


Vital Signs: 


                                   Vital Signs











Temperature  97.6 F   09/25/20 05:00


 


Pulse Rate  87   09/25/20 05:00


 


Respiratory Rate  18   09/24/20 21:32


 


Blood Pressure  146/78   09/25/20 05:00


 


O2 Sat by Pulse Oximetry (%)  100   09/25/20 05:00











Constitutional: Yes: Calm


Cardiovascular: Yes: Regular Rate and Rhythm


Respiratory: Yes: CTA Bilaterally (no wheezing)


Gastrointestinal: Yes: Soft (NT)


Edema: No


Neurological: Yes: Alert, Oriented


Labs: 


                                    CBC, BMP





                                 09/23/20 10:30 





                                 09/23/20 10:30 





                                    INR, PTT











INR  0.95  (0.83-1.09)   09/07/20  22:50    














Assessment/Plan





                                        


echo 8/2020:  lvef 35%, global hk, nl rv, lae, nl bio avr, mild mr, mild tr, 

rvsp 38





echo 9/2020 EF 35-40%, mod to severe global hypokinesis of LV, severe 

hypokinesis of apical anterior wall and apex, LA mod dilated, mild to mod MAC, 

mild to mod MR, mild TR, bio AVR, well seated prosthetic valve, mild AR





ecg: sr, rbbb, PVCs





cxr: clear








a/p:  79 m hx htn, syst chf, as s/p remote bio avr, cad s/p remote pci, tia, 

presented with JO ANN, sepsis with chest pain, elevated trops





chest pain, elevated troponin, CAD, remote PCI:


- patient unable to give further history regarding chest pain, resolved


- EKG unchanged from prior


- may be 2/2 demand in setting of underlying anemia, JO ANN, vs NSTEMI from plaque 

rupture


- also with wall motion abnormalities on echo here from 9/11/2020, known 

underlying CAD


- given poor prognosis and desire to minimize invasive procedures, would defer 

ischemic workup at this point and continue medical management for underlying CAD


- cont aspirin, statin, nitro





chronic syst chf:


- held coreg for low BPs, not on ACE/ARB due to JO ANN


- appears euvolemic, hold diuretics





hypotension, septic shock, UTI:


- bacteremia 2/2 UTI, bcx no growth from 9/10


- improved with IVF, abx


- manage per pmd, ID





JO ANN:


- improved





htn:


- bp stable off meds





avr:


-stable, nl fcn on echo











DNR/DNI

## 2020-09-25 NOTE — PN
Progress Note, SLP





- Note


Progress Note: 


                                Selected Entries











  09/19/20 09/19/20 09/19/20





  09:00 09:19 13:16


 


Breakfast 25% NPO NPO


 


Diet Tolerated   


 


Lunch  NPO NPO


 


Supper   


 


Temperature   


 


Pulse Rate   


 


Blood Pressure   


 


O2 Sat by Pulse   





Oximetry (%)   


 


Oxygen Delivery   





Method   














  09/19/20 09/20/20 09/20/20





  20:00 10:00 14:22


 


Breakfast NPO 50% 


 


Diet Tolerated Poor Poor 


 


Lunch NPO  75%


 


Supper 25%  


 


Temperature   


 


Pulse Rate   


 


Blood Pressure   


 


O2 Sat by Pulse   





Oximetry (%)   


 


Oxygen Delivery   





Method   














  09/21/20 09/21/20 09/22/20





  15:00 21:47 00:00


 


Breakfast 25%  


 


Diet Tolerated Poor Refused 


 


Lunch 75%  


 


Supper   


 


Temperature   


 


Pulse Rate   88


 


Blood Pressure   140/73


 


O2 Sat by Pulse   





Oximetry (%)   


 


Oxygen Delivery   





Method   














  09/22/20 09/22/20 09/22/20





  04:00 09:00 09:41


 


Breakfast   75%


 


Diet Tolerated   


 


Lunch   


 


Supper   


 


Temperature 98.2 F  


 


Pulse Rate 86  


 


Blood Pressure 159/87  


 


O2 Sat by Pulse  100 





Oximetry (%)   


 


Oxygen Delivery  Room Air 





Method   














  09/22/20





  10:40


 


Breakfast 


 


Diet Tolerated 


 


Lunch 


 


Supper 


 


Temperature 


 


Pulse Rate 95 H


 


Blood Pressure 127/77


 


O2 Sat by Pulse 100





Oximetry (%) 


 


Oxygen Delivery 





Method 








                                Laboratory Tests











  09/18/20 09/19/20 09/20/20





  07:35 10:45 06:00


 


WBC  13.1 H  10.7 H  11.7 H














  09/21/20





  05:15


 


WBC  9.3





                                Selected Entries











  09/22/20 09/22/20 09/23/20





  09:41 15:00 02:00


 


Breakfast 75% 75% 


 


Diet Tolerated   


 


Lunch 50% 50% 


 


Temperature   97.7 F


 


Pulse Rate   78


 


Blood Pressure   105/64














  09/23/20 09/23/20





  06:00 09:33


 


Breakfast  25%


 


Diet Tolerated  Poor


 


Lunch  


 


Temperature 97.5 F L 


 


Pulse Rate 83 


 


Blood Pressure 142/79 








                                Laboratory Tests











  09/21/20





  05:15


 


WBC  9.3








                                Selected Entries











  09/23/20 09/23/20 09/24/20





  09:33 21:26 05:29


 


Breakfast 25%  


 


Diet Tolerated Poor Well 


 


Lunch 75%  


 


Supper  50% 


 


Temperature   97.9 F


 


Pulse Rate   95 H


 


Blood Pressure   150/90








                                Laboratory Tests











  09/23/20





  10:30


 


WBC  7.1





                                Selected Entries











  09/24/20 09/24/20 09/25/20





  10:44 21:43 05:00


 


Breakfast 25%  


 


Diet Tolerated Poor  


 


Supper  50% 


 


Temperature   97.6 F


 


Blood Pressure   146/78














  09/25/20 09/25/20





  10:00 10:12


 


Breakfast 25% 


 


Diet Tolerated Poor 


 


Supper  


 


Temperature  98.3 F


 


Blood Pressure  106/59 L








                                Laboratory Tests











  09/23/20





  10:30


 


WBC  7.1

















MBS 9/18- SILENT ASPIRATION ON THIN LIQUID


Downgraded to [place] thick due to silent aspiration, and upgrade trial of 

dysphagia chopped.


Supplements b/n meals.


Eating intermittently. often angry, limited cooperation. On Remeron but does not

accept medication xconsistently

## 2020-09-25 NOTE — PN
Progress Note, Physician





- Current Medication List


Current Medications: 


Active Medications





Acetaminophen (Tylenol -)  1,000 mg PO Q6H PRN


   PRN Reason: PAIN LEVEL 1-5


Albuterol Sulfate (Ventolin Hfa Inhaler -)  2 puff IH TID PRN


   PRN Reason: ASTHMA


   Last Admin: 09/24/20 02:05 Dose:  2 puff


   Documented by: 


Ascorbic Acid (Vitamin C -)  250 mg PO DAILY [facility]


   Last Admin: 09/24/20 10:44 Dose:  Not Given


   Documented by: 


Aspirin (Asa -)  81 mg PO DAILY [facility]


   Last Admin: 09/24/20 10:44 Dose:  Not Given


   Documented by: 


Atorvastatin Calcium (Lipitor -)  40 mg PO [facility]


   Last Admin: 09/24/20 21:31 Dose:  Not Given


   Documented by: 


Famotidine (Pepcid -)  20 mg PO DAILY [facility]


   Last Admin: 09/24/20 10:44 Dose:  Not Given


   Documented by: 


Ferrous Sulfate (Feosol -)  325 mg PO DAILY [facility]


   Last Admin: 09/24/20 10:44 Dose:  Not Given


   Documented by: 


Ampicillin Sodium 2 gm/ Sodium (Chloride)  100 mls @ 200 mls/hr IVPB Q4H-IV [facility];

Protocol


   Last Admin: 09/25/20 05:30 Dose:  Not Given


   Documented by: 


Cefepime HCl 2 gm/ Dextrose  100 mls @ 200 mls/hr IVPB Q8H-IV [facility]; Protocol


   Last Admin: 09/25/20 01:13 Dose:  Not Given


   Documented by: 


Levothyroxine Sodium (Synthroid -)  100 mcg PO ACBK [facility]


   Last Admin: 09/25/20 06:02 Dose:  Not Given


   Documented by: 


Magnesium Hydroxide (Milk Of Magnesia -)  30 ml PO DAILY PRN


   PRN Reason: CONSTIPATION


Mirtazapine (Remeron -)  15 mg PO [facility]


   Last Admin: 09/24/20 21:31 Dose:  Not Given


   Documented by: 


Multivitamins/Minerals/Vitamin C (Tab-A-Vit -)  1 tab PO DAILY [facility]


   Last Admin: 09/24/20 10:34 Dose:  1 tab


   Documented by: 


Nitroglycerin (Nitro-Bid 2% Paste -)  0.5 inch TD Q6H [facility]


   Last Admin: 09/25/20 05:29 Dose:  0.5 inch


   Documented by: 


Oxycodone HCl (Roxicodone -)  5 mg PO Q6H PRN


   PRN Reason: PAIN LEVEL 6-10


   Last Admin: 09/24/20 18:26 Dose:  5 mg


   Documented by: 


Senna (Senna -)  2 tab PO HS [facility]


   Last Admin: 09/24/20 21:32 Dose:  Not Given


   Documented by: 


Tamsulosin HCl (Flomax -)  0.4 mg PO DAILY@0830 [facility]


   Last Admin: 09/24/20 08:38 Dose:  0.4 mg


   Documented by: 











- Objective


Vital Signs: 


                                   Vital Signs











Temperature  97.6 F   09/25/20 05:00


 


Pulse Rate  87   09/25/20 05:00


 


Respiratory Rate  18   09/24/20 21:32


 


Blood Pressure  146/78   09/25/20 05:00


 


O2 Sat by Pulse Oximetry (%)  100   09/25/20 05:00











Cardiovascular: Yes: S1, S2


Respiratory: Yes: Regular, CTA Bilaterally


Gastrointestinal: Yes: Normal Bowel Sounds, Soft


Labs: 


                                    CBC, BMP





                                 09/23/20 10:30 





                                 09/23/20 10:30 





                                    INR, PTT











INR  0.95  (0.83-1.09)   09/07/20  22:50    














Problem List





- Problems


(1) Dysphagia


Code(s): R13.10 - DYSPHAGIA, UNSPECIFIED   





(2) Weakness


Code(s): R53.1 - WEAKNESS   





(3) JO ANN (acute kidney injury)


Code(s): N17.9 - ACUTE KIDNEY FAILURE, UNSPECIFIED   





(4) Chronic retention of urine


Code(s): R33.9 - RETENTION OF URINE, UNSPECIFIED   





(5) Sepsis


Code(s): A41.9 - SEPSIS, UNSPECIFIED ORGANISM   





Assessment/Plan





- Problems


(1) Chronic retention of urine


Assessment/Plan: 


-Continue swenson cath


Problems reviewed: Yes   


Code(s): R33.9 - RETENTION OF URINE, UNSPECIFIED   





(2) JO ANN (acute kidney injury)


Assessment/Plan: 


-Nephrology consult


-Renal U/S


-UC pseudomonas


-Cr at baseline


-REFUSING IVF--DC--LABS--refuses


Problems reviewed: Yes   


Code(s): N17.9 - ACUTE KIDNEY FAILURE, UNSPECIFIED   





(3) Hyperkalemia


Assessment/Plan: 


-K hemolyzed yesterday


-repeat CMP 


-Nephrology on board


Problems reviewed: Yes   


Code(s): E87.5 - HYPERKALEMIA   





(4) Sepsis


Assessment/Plan: 


-ID consult


-IV Ampicillin + Cefepime--IF PT AGREES


-No Lactic acidosis


-Afebrile


-Cultures:


                                  Microbiology





09/10/20 09:37   Blood - Peripheral Venous   Blood Culture - Final


                            NO GROWTH AFTER 5 DAYS INCUBATION


09/09/20 17:15   Blood - Peripheral Venous   Blood Culture - Final


                            NO GROWTH AFTER 5 DAYS INCUBATION


09/09/20 17:30   Blood - Peripheral Venous   Blood Culture - Final


                            NO GROWTH AFTER 5 DAYS INCUBATION


09/09/20 22:25   Urine - Urine Swenson   Urine Culture - Final


                            Pseudomonas Aeruginosa


09/07/20 22:50   Blood - Peripheral Venous   Blood Culture - Final


                            Group D Strep Or Entero Coccus


                            Pseudomonas Species


09/07/20 22:50   Blood - Peripheral Venous   Blood Culture - Final


                            Enterococcus Faecalis


                            Pseudomonas Aeruginosa


09/08/20 00:27   Urine - Urine - Catheterized   Urine Culture - Final


                            Contaminated: Please Repeat











PICC LINE


Problems reviewed: Yes   


Code(s): A41.9 - SEPSIS, UNSPECIFIED ORGANISM   





(5) Failure to thrive


Assessment/Plan: 


-Add multivitamin


-Ensure pudding + Magic cup


-Seen by Speech pathology


-MBS- Mod to severe stasis with puree. Aspiration on thin liquid


-Dysphagia puree thinned out with nectar thick liquids





Problems reviewed: Yes   


Code(s): [id number] -    





(6) Malnutrition


Assessment/Plan: 


-Multivitamin


-Mirtazapine


Problems reviewed: Yes   


Code(s): E46 - UNSPECIFIED PROTEIN-CALORIE MALNUTRITION   


Qualifiers: 


   Protein-calorie malnutrition severity: severe 





(7) Bacteremia


Assessment/Plan: 


-Cultures:


                                  Microbiology





09/10/20 09:37   Blood - Peripheral Venous   Blood Culture - Final


                            NO GROWTH AFTER 5 DAYS INCUBATION


09/09/20 17:15   Blood - Peripheral Venous   Blood Culture - Final


                            NO GROWTH AFTER 5 DAYS INCUBATION


09/09/20 17:30   Blood - Peripheral Venous   Blood Culture - Final


                            NO GROWTH AFTER 5 DAYS INCUBATION


09/09/20 22:25   Urine - Urine Swenson   Urine Culture - Final


                            Pseudomonas Aeruginosa


09/07/20 22:50   Blood - Peripheral Venous   Blood Culture - Final


                            Group D Strep Or Entero Coccus


                            Pseudomonas Species


09/07/20 22:50   Blood - Peripheral Venous   Blood Culture - Final


                            Enterococcus Faecalis


                            Pseudomonas Aeruginosa


09/08/20 00:27   Urine - Urine - Catheterized   Urine Culture - Final


                            Contaminated: Please Repeat





-rest as above


Problems reviewed: Yes   


Code(s): R78.81 - BACTEREMIA   





(8) Chest pain


Assessment/Plan: 


-Cardiology consult appreciated


-Conservative measures


-Tele monitor


-Nitro patch


-Echo reviewed


-EKG AND CE IF AGREES


Problems reviewed: Yes   


Code(s): R07.9 - CHEST PAIN, UNSPECIFIED   





(9) Elevated troponin


Assessment/Plan: 


as above


Problems reviewed: Yes   


Code(s): R79.89 - OTHER SPECIFIED ABNORMAL FINDINGS OF BLOOD CHEMISTRY   





(10) NSTEMI (non-ST elevated myocardial infarction)


Assessment/Plan: 


as above


Problems reviewed: Yes   


Code(s): I21.4 - NON-ST ELEVATION (NSTEMI) MYOCARDIAL INFARCTION   





(11) Abdominal pain


Assessment/Plan: 


-2/2 to constipation


-Milk of magnesia


-CTAP reviewed-unremarkable except constipation


Problems reviewed: Yes   


Code(s): R10.9 - UNSPECIFIED ABDOMINAL PAIN   





(12) Constipation


Assessment/Plan: 


-Continue Senna 2 tabs HS


-MOM PRN


Problems reviewed: Yes   


Code(s): K59.00 - CONSTIPATION, UNSPECIFIED   





PALLIATIVE CARE CONSULT Appreciated


DC PLANNING


PICC LINE Refuses

## 2020-09-25 NOTE — PN
Progress Note, Physician


History of Present Illness: 





Pt seen and examined at bedside. He is awake and alert. He is aggitated. 





- Current Medication List


Current Medications: 


Active Medications





Acetaminophen (Tylenol -)  1,000 mg PO Q6H PRN


   PRN Reason: PAIN LEVEL 1-5


Albuterol Sulfate (Ventolin Hfa Inhaler -)  2 puff IH TID PRN


   PRN Reason: ASTHMA


   Last Admin: 09/24/20 02:05 Dose:  2 puff


   Documented by: 


Ascorbic Acid (Vitamin C -)  250 mg PO DAILY [facility]


   Last Admin: 09/25/20 10:05 Dose:  250 mg


   Documented by: 


Aspirin (Asa -)  81 mg PO DAILY [facility]


   Last Admin: 09/25/20 10:04 Dose:  81 mg


   Documented by: 


Atorvastatin Calcium (Lipitor -)  40 mg PO [facility]


   Last Admin: 09/24/20 21:31 Dose:  Not Given


   Documented by: 


Famotidine (Pepcid -)  20 mg PO DAILY [facility]


   Last Admin: 09/25/20 10:04 Dose:  20 mg


   Documented by: 


Ferrous Sulfate (Feosol -)  325 mg PO DAILY [facility]


   Last Admin: 09/25/20 10:04 Dose:  325 mg


   Documented by: 


Ampicillin Sodium 2 gm/ Sodium (Chloride)  100 mls @ 200 mls/hr IVPB Q4H-IV [facility];

Protocol


   Last Admin: 09/25/20 10:25 Dose:  Not Given


   Documented by: 


Cefepime HCl 2 gm/ Dextrose  100 mls @ 200 mls/hr IVPB Q8H-IV [facility]; Protocol


   Last Admin: 09/25/20 10:05 Dose:  Not Given


   Documented by: 


Levothyroxine Sodium (Synthroid -)  100 mcg PO ACBK [facility]


   Last Admin: 09/25/20 06:02 Dose:  Not Given


   Documented by: 


Magnesium Hydroxide (Milk Of Magnesia -)  30 ml PO DAILY PRN


   PRN Reason: CONSTIPATION


Mirtazapine (Remeron -)  15 mg PO HS [facility]


   Last Admin: 09/24/20 21:31 Dose:  Not Given


   Documented by: 


Multivitamins/Minerals/Vitamin C (Tab-A-Vit -)  1 tab PO DAILY [facility]


   Last Admin: 09/25/20 10:04 Dose:  1 tab


   Documented by: 


Nitroglycerin (Nitro-Bid 2% Paste -)  0.5 inch TD Q6H [facility]


   Last Admin: 09/25/20 05:29 Dose:  0.5 inch


   Documented by: 


Oxycodone HCl (Roxicodone -)  5 mg PO Q6H PRN


   PRN Reason: PAIN LEVEL 6-10


   Last Admin: 09/24/20 18:26 Dose:  5 mg


   Documented by: 


Senna (Senna -)  2 tab PO HS [facility]


   Last Admin: 09/24/20 21:32 Dose:  Not Given


   Documented by: 


Tamsulosin HCl (Flomax -)  0.4 mg PO DAILY@0830 [facility]


   Last Admin: 09/25/20 10:04 Dose:  0.4 mg


   Documented by: 











- Objective


Vital Signs: 


                                   Vital Signs











Temperature  98.3 F   09/25/20 10:12


 


Pulse Rate  88   09/25/20 10:12


 


Respiratory Rate  19   09/25/20 10:12


 


Blood Pressure  106/59 L  09/25/20 10:12


 


O2 Sat by Pulse Oximetry (%)  100   09/25/20 10:12











Constitutional: Yes: Calm


Eyes: Yes: Conjunctiva Clear


Cardiovascular: Yes: S1, S2


Respiratory: Yes: CTA Bilaterally


Gastrointestinal: Yes: Soft


Genitourinary: Yes: WNL


Musculoskeletal: Yes: WNL


Edema: No


Neurological: Yes: Oriented


Psychiatric: Yes: Oriented


Labs: 


                                    CBC, BMP





                                 09/23/20 10:30 





                                 09/23/20 10:30 





                                    INR, PTT











INR  0.95  (0.83-1.09)   09/07/20  22:50    














Problem List





- Problems


(1) JO ANN (acute kidney injury)


Code(s): N17.9 - ACUTE KIDNEY FAILURE, UNSPECIFIED   





(2) Hyperkalemia


Code(s): E87.5 - HYPERKALEMIA   





(3) Sepsis


Code(s): A41.9 - SEPSIS, UNSPECIFIED ORGANISM   





Assessment/Plan


                               Current Medications











Generic Name Dose Route Start Last Admin





  Trade Name Freq  PRN Reason Stop Dose Admin


 


Acetaminophen  1,000 mg  09/23/20 00:39 





  Tylenol -  PO  





  Q6H PRN  





  PAIN LEVEL 1-5  


 


Albuterol Sulfate  2 puff  09/23/20 00:39  09/24/20 02:05





  Ventolin Hfa Inhaler -  IH   2 puff





  TID PRN   Administration





  ASTHMA  


 


Ascorbic Acid  250 mg  09/23/20 10:00  09/25/20 10:05





  Vitamin C -  PO   250 mg





  DAILY [facility]   Administration


 


Aspirin  81 mg  09/23/20 10:00  09/25/20 10:04





  Asa -  PO   81 mg





  DAILY [facility]   Administration


 


Atorvastatin Calcium  40 mg  09/23/20 22:00  09/24/20 21:31





  Lipitor -  PO   Not Given





  HS [facility]  


 


Famotidine  20 mg  09/23/20 10:00  09/25/20 10:04





  Pepcid -  PO   20 mg





  DAILY [facility]   Administration


 


Ferrous Sulfate  325 mg  09/23/20 10:00  09/25/20 10:04





  Feosol -  PO   325 mg





  DAILY [facility]   Administration


 


Ampicillin Sodium 2 gm/ Sodium  100 mls @ 200 mls/hr  09/23/20 02:00  09/25/20 

10:25





  Chloride  IVPB   Not Given





  Q4H-IV [facility]  





  Protocol  


 


Cefepime HCl 2 gm/ Dextrose  100 mls @ 200 mls/hr  09/23/20 02:00  09/25/20 

10:05





  IVPB   Not Given





  Q8H-IV [facility]  





  Protocol  


 


Levothyroxine Sodium  100 mcg  09/23/20 07:00  09/25/20 06:02





  Synthroid -  PO   Not Given





  ACBK [facility]  


 


Magnesium Hydroxide  30 ml  09/23/20 00:39 





  Milk Of Magnesia -  PO  





  DAILY PRN  





  CONSTIPATION  


 


Mirtazapine  15 mg  09/23/20 22:00  09/24/20 21:31





  Remeron -  PO   Not Given





  HS [facility]  


 


Multivitamins/Minerals/Vitamin C  1 tab  09/23/20 10:00  09/25/20 10:04





  Tab-A-Vit -  PO   1 tab





  DAILY [facility]   Administration


 


Nitroglycerin  0.5 inch  09/23/20 06:00  09/25/20 05:29





  Nitro-Bid 2% Paste -  TD   0.5 inch





  Q6H [facility]   Administration


 


Oxycodone HCl  5 mg  09/23/20 09:26  09/24/20 18:26





  Roxicodone -  PO   5 mg





  Q6H PRN   Administration





  PAIN LEVEL 6-10  


 


Senna  2 tab  09/23/20 22:00  09/24/20 21:32





  Senna -  PO   Not Given





  HS [facility]  


 


Tamsulosin HCl  0.4 mg  09/23/20 08:30  09/25/20 10:04





  Flomax -  PO   0.4 mg





  DAILY@0830 [facility]   Administration











Impression


1. JO ANN


2. sepsis


3. hyperkalemia


4. hypotension


5. chf


6. bph


7. cad


8. tia


9. gerd


10. hypernatremia








Plan


- no new labs


- encourage po intake


- monitor lytes


- pt not cooperative with staff


- will follow prn

## 2020-09-25 NOTE — EKG
Test Reason : 

Blood Pressure : ***/*** mmHG

Vent. Rate : 085 BPM     Atrial Rate : 085 BPM

   P-R Int : 216 ms          QRS Dur : 170 ms

    QT Int : 436 ms       P-R-T Axes : 000 270 085 degrees

   QTc Int : 518 ms

 

SINUS RHYTHM WITH SINUS ARRHYTHMIA WITH 1ST DEGREE A-V BLOCK

LEFT AXIS DEVIATION

RIGHT BUNDLE BRANCH BLOCK

INFERIOR INFARCT (CITED ON OR BEFORE 02-AUG-2020)

ANTEROLATERAL INFARCT (CITED ON OR BEFORE 02-AUG-2020)

ABNORMAL ECG

WHEN COMPARED WITH ECG OF 19-SEP-2020 14:21,

PREMATURE SUPRAVENTRICULAR COMPLEXES ARE NO LONGER PRESENT

Confirmed by RENATA MAZARIEGOS MD ([id number]) on 9/25/2020 2:30:42 PM

 

Referred By: JOSSELINE MALHOTRA DR           Confirmed By:RENATA MAZARIEGOS MD

## 2022-10-29 ENCOUNTER — HOSPITAL ENCOUNTER (INPATIENT)
Dept: HOSPITAL 74 - JER | Age: 82
LOS: 8 days | Discharge: HOSPICE | DRG: 178 | End: 2022-11-06
Attending: NURSE PRACTITIONER | Admitting: INTERNAL MEDICINE
Payer: COMMERCIAL

## 2022-10-29 VITALS — BODY MASS INDEX: 16.2 KG/M2

## 2022-10-29 DIAGNOSIS — K21.9: ICD-10-CM

## 2022-10-29 DIAGNOSIS — M62.82: ICD-10-CM

## 2022-10-29 DIAGNOSIS — Z95.5: ICD-10-CM

## 2022-10-29 DIAGNOSIS — Z51.5: ICD-10-CM

## 2022-10-29 DIAGNOSIS — I50.22: ICD-10-CM

## 2022-10-29 DIAGNOSIS — N17.9: ICD-10-CM

## 2022-10-29 DIAGNOSIS — D72.829: ICD-10-CM

## 2022-10-29 DIAGNOSIS — I11.0: ICD-10-CM

## 2022-10-29 DIAGNOSIS — I71.21: ICD-10-CM

## 2022-10-29 DIAGNOSIS — E78.5: ICD-10-CM

## 2022-10-29 DIAGNOSIS — I25.10: ICD-10-CM

## 2022-10-29 DIAGNOSIS — U07.1: Primary | ICD-10-CM

## 2022-10-29 DIAGNOSIS — D64.9: ICD-10-CM

## 2022-10-29 LAB
ALBUMIN SERPL-MCNC: 3.3 G/DL (ref 3.4–5)
ALP SERPL-CCNC: 171 U/L (ref 45–117)
ALT SERPL-CCNC: 31 U/L (ref 13–61)
ANION GAP SERPL CALC-SCNC: 14 MMOL/L (ref 8–16)
ANISOCYTOSIS BLD QL: 0
APTT BLD: 34.5 SECONDS (ref 25.2–36.5)
AST SERPL-CCNC: 144 U/L (ref 15–37)
BASOPHILS # BLD: 0.2 % (ref 0–2)
BILIRUB SERPL-MCNC: 1.4 MG/DL (ref 0.2–1)
BUN SERPL-MCNC: 27.6 MG/DL (ref 7–18)
CALCIUM SERPL-MCNC: 8.9 MG/DL (ref 8.5–10.1)
CHLORIDE SERPL-SCNC: 108 MMOL/L (ref 98–107)
CO2 SERPL-SCNC: 21 MMOL/L (ref 21–32)
CREAT SERPL-MCNC: 1.8 MG/DL (ref 0.55–1.3)
DEPRECATED RDW RBC AUTO: 15.7 % (ref 11.9–15.9)
EOSINOPHIL # BLD: 0 % (ref 0–4.5)
GLUCOSE SERPL-MCNC: 132 MG/DL (ref 74–106)
HCT VFR BLD CALC: 37.2 % (ref 35.4–49)
HGB BLD-MCNC: 11.6 GM/DL (ref 11.7–16.9)
INR BLD: 1.23 (ref 0.83–1.09)
LYMPHOCYTES # BLD: 2.5 % (ref 8–40)
MACROCYTES BLD QL: 0
MCH RBC QN AUTO: 28.7 PG (ref 25.7–33.7)
MCHC RBC AUTO-ENTMCNC: 31.2 G/DL (ref 32–35.9)
MCV RBC: 91.9 FL (ref 80–96)
MONOCYTES # BLD AUTO: 7 % (ref 3.8–10.2)
NEUTROPHILS # BLD: 90.3 % (ref 42.8–82.8)
OVALOCYTES BLD QL SMEAR: (no result)
PLATELET # BLD AUTO: 206 10^3/UL (ref 134–434)
PMV BLD: 9.4 FL (ref 7.5–11.1)
PROT SERPL-MCNC: 7.5 G/DL (ref 6.4–8.2)
PT PNL PPP: 14.2 SEC (ref 9.7–13)
RBC # BLD AUTO: 4.05 M/MM3 (ref 4–5.6)
SODIUM SERPL-SCNC: 143 MMOL/L (ref 136–145)
WBC # BLD AUTO: 21.4 K/MM3 (ref 4–10)

## 2022-10-30 LAB
ALBUMIN SERPL-MCNC: 3 G/DL (ref 3.4–5)
ALP SERPL-CCNC: 159 U/L (ref 45–117)
ALT SERPL-CCNC: 65 U/L (ref 13–61)
ANION GAP SERPL CALC-SCNC: 9 MMOL/L (ref 8–16)
AST SERPL-CCNC: 310 U/L (ref 15–37)
BILIRUB SERPL-MCNC: 0.8 MG/DL (ref 0.2–1)
BUN SERPL-MCNC: 35.3 MG/DL (ref 7–18)
CALCIUM SERPL-MCNC: 8.6 MG/DL (ref 8.5–10.1)
CHLORIDE SERPL-SCNC: 108 MMOL/L (ref 98–107)
CO2 SERPL-SCNC: 23 MMOL/L (ref 21–32)
CREAT SERPL-MCNC: 2.1 MG/DL (ref 0.55–1.3)
DEPRECATED RDW RBC AUTO: 15.7 % (ref 11.9–15.9)
GLUCOSE SERPL-MCNC: 133 MG/DL (ref 74–106)
HCT VFR BLD CALC: 36.7 % (ref 35.4–49)
HGB BLD-MCNC: 11.6 GM/DL (ref 11.7–16.9)
MCH RBC QN AUTO: 29.2 PG (ref 25.7–33.7)
MCHC RBC AUTO-ENTMCNC: 31.5 G/DL (ref 32–35.9)
MCV RBC: 92.7 FL (ref 80–96)
PLATELET # BLD AUTO: 159 10^3/UL (ref 134–434)
PMV BLD: 10 FL (ref 7.5–11.1)
PROT SERPL-MCNC: 7 G/DL (ref 6.4–8.2)
RBC # BLD AUTO: 3.96 M/MM3 (ref 4–5.6)
SODIUM SERPL-SCNC: 140 MMOL/L (ref 136–145)
WBC # BLD AUTO: 14 K/MM3 (ref 4–10)

## 2022-10-30 RX ADMIN — ATORVASTATIN CALCIUM SCH MG: 80 TABLET, FILM COATED ORAL at 22:16

## 2022-10-30 RX ADMIN — SODIUM CHLORIDE SCH MLS/HR: 9 INJECTION, SOLUTION INTRAVENOUS at 18:00

## 2022-10-30 RX ADMIN — CARVEDILOL SCH MG: 3.12 TABLET, FILM COATED ORAL at 22:16

## 2022-10-31 LAB
APPEARANCE UR: (no result)
BACTERIA # UR AUTO: (no result) /UL (ref 0–1359)
BILIRUB UR STRIP.AUTO-MCNC: NEGATIVE MG/DL
CASTS URNS QL MICRO: 0 /UL (ref 0–3.1)
COLOR UR: YELLOW
EPITH CASTS URNS QL MICRO: 0 /UL (ref 0–25.1)
KETONES UR QL STRIP: (no result)
LEUKOCYTE ESTERASE UR QL STRIP.AUTO: (no result)
NITRITE UR QL STRIP: NEGATIVE
PH UR: 5 [PH] (ref 5–8)
PROT UR QL STRIP: (no result)
PROT UR QL STRIP: NEGATIVE
RBC # BLD AUTO: 23.3 /UL (ref 0–23.9)
SP GR UR: 1.02 (ref 1.01–1.03)
UROBILINOGEN UR STRIP-MCNC: 1 MG/DL (ref 0.2–1)
WBC # UR AUTO: 380 /UL (ref 0–25.8)
YEAST BUDDING URNS QL: (no result)

## 2022-10-31 RX ADMIN — SODIUM CHLORIDE SCH MLS/HR: 9 INJECTION, SOLUTION INTRAVENOUS at 12:10

## 2022-10-31 RX ADMIN — ATORVASTATIN CALCIUM SCH MG: 80 TABLET, FILM COATED ORAL at 23:55

## 2022-10-31 RX ADMIN — CARVEDILOL SCH MG: 3.12 TABLET, FILM COATED ORAL at 12:10

## 2022-10-31 RX ADMIN — CARVEDILOL SCH MG: 3.12 TABLET, FILM COATED ORAL at 23:55

## 2022-11-01 RX ADMIN — CARVEDILOL SCH: 3.12 TABLET, FILM COATED ORAL at 10:03

## 2022-11-01 RX ADMIN — ATORVASTATIN CALCIUM SCH MG: 80 TABLET, FILM COATED ORAL at 22:17

## 2022-11-01 RX ADMIN — CARVEDILOL SCH MG: 3.12 TABLET, FILM COATED ORAL at 22:17

## 2022-11-02 RX ADMIN — SODIUM CHLORIDE SCH: 9 INJECTION, SOLUTION INTRAVENOUS at 02:40

## 2022-11-02 RX ADMIN — CARVEDILOL SCH MG: 3.12 TABLET, FILM COATED ORAL at 10:12

## 2022-11-02 RX ADMIN — SODIUM CHLORIDE SCH: 9 INJECTION, SOLUTION INTRAVENOUS at 11:20

## 2022-11-02 RX ADMIN — CARVEDILOL SCH MG: 3.12 TABLET, FILM COATED ORAL at 21:05

## 2022-11-02 RX ADMIN — ATORVASTATIN CALCIUM SCH MG: 80 TABLET, FILM COATED ORAL at 21:05

## 2022-11-03 RX ADMIN — ALUMINUM HYDROXIDE, MAGNESIUM HYDROXIDE, AND SIMETHICONE SCH ML: 200; 200; 20 SUSPENSION ORAL at 17:22

## 2022-11-03 RX ADMIN — CARVEDILOL SCH MG: 3.12 TABLET, FILM COATED ORAL at 09:16

## 2022-11-03 RX ADMIN — CARVEDILOL SCH MG: 3.12 TABLET, FILM COATED ORAL at 21:27

## 2022-11-03 RX ADMIN — ATORVASTATIN CALCIUM SCH MG: 80 TABLET, FILM COATED ORAL at 21:27

## 2022-11-03 RX ADMIN — SODIUM CHLORIDE SCH MLS/HR: 9 INJECTION, SOLUTION INTRAVENOUS at 13:55

## 2022-11-04 RX ADMIN — ALUMINUM HYDROXIDE, MAGNESIUM HYDROXIDE, AND SIMETHICONE SCH ML: 200; 200; 20 SUSPENSION ORAL at 01:15

## 2022-11-04 RX ADMIN — CARVEDILOL SCH MG: 3.12 TABLET, FILM COATED ORAL at 22:00

## 2022-11-04 RX ADMIN — SODIUM CHLORIDE SCH MLS/HR: 9 INJECTION, SOLUTION INTRAVENOUS at 16:24

## 2022-11-04 RX ADMIN — CARVEDILOL SCH MG: 3.12 TABLET, FILM COATED ORAL at 10:04

## 2022-11-04 RX ADMIN — ALUMINUM HYDROXIDE, MAGNESIUM HYDROXIDE, AND SIMETHICONE SCH ML: 200; 200; 20 SUSPENSION ORAL at 18:12

## 2022-11-04 RX ADMIN — PANTOPRAZOLE SODIUM SCH MG: 40 INJECTION, POWDER, FOR SOLUTION INTRAVENOUS at 10:04

## 2022-11-04 RX ADMIN — SODIUM CHLORIDE SCH: 9 INJECTION, SOLUTION INTRAVENOUS at 13:05

## 2022-11-04 RX ADMIN — ALUMINUM HYDROXIDE, MAGNESIUM HYDROXIDE, AND SIMETHICONE SCH ML: 200; 200; 20 SUSPENSION ORAL at 13:05

## 2022-11-04 RX ADMIN — ALUMINUM HYDROXIDE, MAGNESIUM HYDROXIDE, AND SIMETHICONE SCH ML: 200; 200; 20 SUSPENSION ORAL at 05:33

## 2022-11-05 RX ADMIN — PANTOPRAZOLE SODIUM SCH MG: 40 INJECTION, POWDER, FOR SOLUTION INTRAVENOUS at 09:25

## 2022-11-05 RX ADMIN — ALUMINUM HYDROXIDE, MAGNESIUM HYDROXIDE, AND SIMETHICONE SCH ML: 200; 200; 20 SUSPENSION ORAL at 17:23

## 2022-11-05 RX ADMIN — ALUMINUM HYDROXIDE, MAGNESIUM HYDROXIDE, AND SIMETHICONE SCH: 200; 200; 20 SUSPENSION ORAL at 06:08

## 2022-11-05 RX ADMIN — CARVEDILOL SCH MG: 3.12 TABLET, FILM COATED ORAL at 09:24

## 2022-11-05 RX ADMIN — ALUMINUM HYDROXIDE, MAGNESIUM HYDROXIDE, AND SIMETHICONE SCH ML: 200; 200; 20 SUSPENSION ORAL at 01:52

## 2022-11-05 RX ADMIN — ACETAMINOPHEN PRN MG: 10 INJECTION, SOLUTION INTRAVENOUS at 13:11

## 2022-11-05 RX ADMIN — SODIUM CHLORIDE SCH: 9 INJECTION, SOLUTION INTRAVENOUS at 13:03

## 2022-11-05 RX ADMIN — ALUMINUM HYDROXIDE, MAGNESIUM HYDROXIDE, AND SIMETHICONE SCH ML: 200; 200; 20 SUSPENSION ORAL at 13:10

## 2022-11-05 RX ADMIN — CARVEDILOL SCH MG: 3.12 TABLET, FILM COATED ORAL at 21:31

## 2022-11-06 VITALS — SYSTOLIC BLOOD PRESSURE: 148 MMHG | TEMPERATURE: 98 F | HEART RATE: 72 BPM | DIASTOLIC BLOOD PRESSURE: 86 MMHG

## 2022-11-06 VITALS — RESPIRATION RATE: 20 BRPM

## 2022-11-06 RX ADMIN — ACETAMINOPHEN PRN MG: 10 INJECTION, SOLUTION INTRAVENOUS at 05:05

## 2022-11-06 RX ADMIN — CARVEDILOL SCH MG: 3.12 TABLET, FILM COATED ORAL at 10:10

## 2022-11-06 RX ADMIN — ALUMINUM HYDROXIDE, MAGNESIUM HYDROXIDE, AND SIMETHICONE SCH ML: 200; 200; 20 SUSPENSION ORAL at 00:01

## 2022-11-06 RX ADMIN — PANTOPRAZOLE SODIUM SCH MG: 40 INJECTION, POWDER, FOR SOLUTION INTRAVENOUS at 10:22

## 2022-11-06 RX ADMIN — ACETAMINOPHEN PRN MG: 10 INJECTION, SOLUTION INTRAVENOUS at 10:10

## 2022-11-06 RX ADMIN — ALUMINUM HYDROXIDE, MAGNESIUM HYDROXIDE, AND SIMETHICONE SCH: 200; 200; 20 SUSPENSION ORAL at 11:34

## 2022-11-06 RX ADMIN — ALUMINUM HYDROXIDE, MAGNESIUM HYDROXIDE, AND SIMETHICONE SCH: 200; 200; 20 SUSPENSION ORAL at 05:29

## 2025-03-21 NOTE — PDOC
Documentation entered by Ivy Pretty SCRIBE, acting as scribe for Sakina Bergman MD.








Sakina Bergman MD:  This documentation has been prepared by the Maria De Jesus espinal Brenda, SCRIBE, under my direction and personally reviewed by me in its 

entirety.  I confirm that the documentation accurately reflects all work, 

treatment, procedures, and medical decision making performed by me.  





Attending Attestation





- Resident


Resident Name: Derek Lal





- ED Attending Attestation


I have performed the following: I have examined & evaluated the patient, The 

case was reviewed & discussed with the resident, I agree w/resident's findings &

plan, Exceptions are as noted





- HPI


HPI: 





09/07/20 22:22


this 80 yo male BIBA from [facility] for hypotension


PMH:  HTN,BPH,indwelling swenson catheter, chf,aortic stenosis s/p bioprosthetic 

valve replacement,CAD s/p PCI,stent,GERD,TIA





- Physicial Exam


PE: 





09/07/20 22:24


Cachetic 80 yo male BIBA for low blood pressure


head ncat


dry mucus membranes


neck  no jvd,no bruit


lungs  cta b/l


cvs [id number]


abdomen distended bladder


there is a stage 2  decubitus ulcer on sacrum 


skin warm  and dry


extremities LE edema 


neuro alert and conversant





09/07/20 22:26





09/08/20 00:30








- Medical Decision Making





09/07/20 22:28


80 yo male who is DNI/DNR was brought in from nursing home for hypotension





concern for  sepsis,ACS 


plan    CBC,COMP,TROPONIN,ua,CXR,bc


09/07/20 22:59


In reviewing the specifics of his advanced directives and he does not want 

cpr,intubation,surgery,dialysis,antibiotics


His daughter is  the health care proxy 


09/07/20 23:11


Dr Derek Bear spoke with the health care proxy daughter Glenda and she refused 

placement  of the central line but did give approval of antibiotics


09/08/20 00:17








pt appears very dehydrated ,he has dry mucus membranes 


Last month his bun was 25 with a normal creatinin and today the bun= 190 and his

cr=7





-Leukocytosis of 35,000 wih shift


09/08/20 00:31


imp: sepsis,JO ANN





Discharge





- Discharge Information


Problems reviewed: Yes


Clinical Impression/Diagnosis: 


 Sepsis





Condition: Poor





- Follow up/Referral





- Patient Discharge Instructions





- Post Discharge Activity
History of Present Illness





- General


Chief Complaint: Blood Pressure Problem


Stated Complaint: HYPOTENSION


Time Seen by Provider: 09/07/20 21:57


History Source: Nursing Home Records


Exam Limitations: Clinical Condition, Dementia





- History of Present Illness


Initial Comments: 





09/07/20 22:20


79M PMH CHF, CAD, HTN, BPH, hypothyroidism BIBEMS from [facility] for hypotension 

and lethargy. Pt c/o suprapubic pain. Pt unable to fully participate in H&P 2/2 

AAOx1, poor historian. Hx per NH paperwork. Notably, DNR / DNI, no abx. NKDA. 

















Past History





- Medical History


Allergies/Adverse Reactions: 


                                    Allergies











Allergy/AdvReac Type Severity Reaction Status Date / Time


 


No Known Allergies Allergy   Verified 09/07/20 22:00











Home Medications: 


Ambulatory Orders





Atorvastatin Ca [Lipitor] 20 mg PO HS 08/02/20 


Ergocalciferol (Vitamin D2) [Vitamin D2] 50,000 unit PO WEEKLY 08/02/20 


Ferrous Sulfate 325 mg PO DAILY 08/02/20 


Levothyroxine [Synthroid -] 100 mcg PO DAILY 08/02/20 


Tamsulosin HCl 0.4 mg PO DAILY 08/02/20 


Ascorbic Acid [Vitamin C -] 250 mg PO DAILY  tablet 08/06/20 


Aspirin [ASA -] 81 mg PO DAILY  tab.chew 08/06/20 


Carvedilol [Coreg -] 3.125 mg PO BID  tablet 08/06/20 


Docusate Sodium [Colace -] 100 mg PO BID  capsule 08/06/20 


Furosemide [Lasix -] 40 mg PO DAILY  tablet 08/06/20 


Pantoprazole Sodium [Protonix -] 20 mg PO DAILY  tablet.ec 08/06/20 


Acetaminophen 650 mg PO QID PRN 09/07/20 


Albuterol Sulfate [Proventil Hfa] 6.7 gm IH TID PRN 09/07/20 


Mag Hydrox/Aluminum Hyd/Simeth [Maalox Advanced Suspension] 30 ml PO BID PRN 

09/07/20 


Melatonin 3 mg PO HS 09/07/20 








Cardiac Disorders: Yes


COPD: No


CHF: No


GI Disorders: Yes (GERD)


 Disorders: Yes (urinary retention swenson in place)


HTN: Yes





- Surgical History


Cardiac Surgery: Yes (x 2)


GI Surgery: Yes





- Psycho-Social/Smoking History


Smoking History: Never smoked


Have you smoked in the past 12 months: No





- Substance Abuse Hx (Audit-C & DAST Scrn)


How often the patient has a drink containing alcohol: Never


Score: In Men: 4 or > Positive; In Women: 3 or > Positive: 0


Screen Result (Pos requires Nsg. Audit-10AR): Negative


In the last yr the pt used illegal drug/Rx for NonMed reason: No


Score:  Yes response is considered Positive: 0


Screen Result (Positive result requires Nsg. DAST-10): Negative





**Review of Systems





- Review of Systems


Able to Perform ROS?: No


Comments:: 





09/08/20 01:47


LIMITED 2/2 POOR HISTORIAN


Denies f/c


RESP: Denies SOB, cough


CARD: Denies chest pain


GI: + abd pain











*Physical Exam





- Vital Signs


                                Last Vital Signs











Temp Pulse Resp BP Pulse Ox


 


 97.3 F L  72   17   71/52 L  100 


 


 09/07/20 21:55  09/07/20 21:55  09/07/20 21:55  09/07/20 21:55  09/07/20 21:55














- Physical Exam





09/08/20 01:47


GEN: Elderly, NAD, AAOx1 to self only. 


HEENT: NC/AT, EOMI, PERRL. Supple neck w/ FROM.


CV: S1/S2, RRR, no m/r/g


LUNG: CTAB, no wheezes, crackles, rales, rhonchi. 


GI: +TTP suprapubically, o/w soft w/ +BS. No blood on stool via rectal 

thermometer


MSK: No LE edema. No obvious deformities of all extremities. 


SKIN: stage II sacral decub


PSYCH: Follows simple commands


NEURO: Moves all extremities











ED Treatment Course





- LABORATORY


CBC & Chemistry Diagram: 


                                 09/07/20 22:50





                                 09/07/20 22:50





Medical Decision Making





- Medical Decision Making





09/08/20 01:47


79M BIBEMS from [facility] for hypotension and lethargy. Limited hx by patient. BP 

70s/50s. 


Sepsis w/u; consider UTI as source


fluids


Per [facility] and goals of care: DNR/DNI, no abx





09/07/20 23:03


dw HCP Glenda re: goals of care: states please provide abx as indicated; dw 

risks and benefits of central line and decided against central access. Maintain 

DNR/DNI.





09/07/20 23:32


read back WBC 34


CBC reviewed; large left shift 


CXR reviewed w/o obvious infiltrates 


Will obtain urine and start Vanc/Zosyn 


admit 





09/07/20 23:53


labs reviewed


hemolysis on K


very high BNP but BP also fluid responsive 


will sign out to PM team for further management





09/08/20 00:03


read back BUN/CR 190s/7 





Discharge





- Discharge Information


Problems reviewed: Yes


Clinical Impression/Diagnosis: 


 Sepsis





Condition: Poor





- Admission


Yes





- Follow up/Referral





- Patient Discharge Instructions





- Post Discharge Activity
40